# Patient Record
Sex: FEMALE | Race: WHITE | NOT HISPANIC OR LATINO | Employment: FULL TIME | URBAN - METROPOLITAN AREA
[De-identification: names, ages, dates, MRNs, and addresses within clinical notes are randomized per-mention and may not be internally consistent; named-entity substitution may affect disease eponyms.]

---

## 2017-02-17 ENCOUNTER — APPOINTMENT (OUTPATIENT)
Dept: MAMMOGRAPHY | Facility: HOSPITAL | Age: 42
End: 2017-02-17
Attending: OBSTETRICS & GYNECOLOGY

## 2017-02-21 ENCOUNTER — HOSPITAL ENCOUNTER (OUTPATIENT)
Dept: MAMMOGRAPHY | Facility: HOSPITAL | Age: 42
Discharge: HOME | End: 2017-02-21
Attending: OBSTETRICS & GYNECOLOGY | Admitting: OBSTETRICS & GYNECOLOGY

## 2017-02-21 DIAGNOSIS — N64.89 BREAST ASYMMETRY: ICD-10-CM

## 2017-02-21 PROCEDURE — G0204 DX MAMMO INCL CAD BI: HCPCS

## 2017-02-24 ENCOUNTER — APPOINTMENT (OUTPATIENT)
Dept: MAMMOGRAPHY | Facility: HOSPITAL | Age: 42
End: 2017-02-24
Attending: OBSTETRICS & GYNECOLOGY

## 2017-03-03 ENCOUNTER — HOSPITAL ENCOUNTER (OUTPATIENT)
Dept: MAMMOGRAPHY | Facility: HOSPITAL | Age: 42
Discharge: HOME | End: 2017-03-03
Attending: OBSTETRICS & GYNECOLOGY | Admitting: OBSTETRICS & GYNECOLOGY

## 2017-03-03 VITALS
TEMPERATURE: 97.8 F | OXYGEN SATURATION: 100 % | HEIGHT: 69 IN | BODY MASS INDEX: 23.7 KG/M2 | WEIGHT: 160 LBS | RESPIRATION RATE: 16 BRPM | HEART RATE: 64 BPM

## 2017-03-03 DIAGNOSIS — R92.1 BREAST CALCIFICATIONS ON MAMMOGRAM: ICD-10-CM

## 2017-03-03 PROCEDURE — 88305 TISSUE EXAM BY PATHOLOGIST: CPT | Performed by: OBSTETRICS & GYNECOLOGY

## 2017-03-03 RX ORDER — LIDOCAINE HYDROCHLORIDE 10 MG/ML
20 INJECTION, SOLUTION INFILTRATION; PERINEURAL ONCE
Status: CANCELLED | OUTPATIENT
Start: 2017-03-03 | End: 2017-03-03

## 2017-03-03 RX ORDER — LIDOCAINE WITH 8.4% SOD BICARB 0.9%(10ML)
3 SYRINGE (ML) INJECTION ONCE
Status: CANCELLED | OUTPATIENT
Start: 2017-03-03 | End: 2017-03-03

## 2017-03-03 NOTE — NURSING NOTE
VSS. Denies pain. Medical/surgical history and medications reviewed. No distress noted. Procedural education completed with patient's questions addressed.

## 2017-03-03 NOTE — NURSING NOTE
Biopsy site to left mid breast clear with Skin Affix dry and intact. Small dime sized area of firmness noted below site. Reports area is sore and somewhat painful. Plans to start Tylenol once she gets home. Paper taped two folded 4x4's over area and then applied wide Ace pressure wrap over/around breast/chest area in place or bra. Encouraged her to wear this at least 2 days and the try her bra for the remaining 2 days. Ice pack with protective covering applied inside of pressure wrap and over biopsy site. Discharge instructions discussed with understanding voiced by patient. Copies provided to patient. No distress noted. To home via private vehicle accompanied by her cousin.

## 2017-03-04 ENCOUNTER — TELEPHONE (OUTPATIENT)
Dept: INTERVENTIONAL RADIOLOGY/VASCULAR | Facility: HOSPITAL | Age: 42
End: 2017-03-04

## 2017-03-06 LAB
CYTO UR: NORMAL
LAB AP CASE REPORT: NORMAL
Lab: NORMAL
PATH REPORT.FINAL DX SPEC: NORMAL
PATH REPORT.GROSS SPEC: NORMAL

## 2017-03-07 ENCOUNTER — TELEPHONE (OUTPATIENT)
Dept: OBSTETRICS AND GYNECOLOGY | Facility: CLINIC | Age: 42
End: 2017-03-07

## 2017-03-10 ENCOUNTER — TELEPHONE (OUTPATIENT)
Dept: OBSTETRICS AND GYNECOLOGY | Facility: CLINIC | Age: 42
End: 2017-03-10

## 2017-03-14 ENCOUNTER — TELEPHONE (OUTPATIENT)
Dept: OBSTETRICS AND GYNECOLOGY | Facility: CLINIC | Age: 42
End: 2017-03-14

## 2017-03-22 ENCOUNTER — TELEPHONE (OUTPATIENT)
Dept: OBSTETRICS AND GYNECOLOGY | Facility: CLINIC | Age: 42
End: 2017-03-22

## 2017-06-08 ENCOUNTER — TELEPHONE (OUTPATIENT)
Dept: OBSTETRICS AND GYNECOLOGY | Facility: CLINIC | Age: 42
End: 2017-06-08

## 2017-06-08 NOTE — TELEPHONE ENCOUNTER
PT IS DUE FOR AE IN AUG. WANTS TO KNOW IF OK WITH YOU TO GET MIRENA BEFORE THAT. YOU HAD DISCUSSED WITH HER AT LAST VISIT.  [phone].      Phone call.  Unable to reach the patient      I have been unable to reach the patient despite several attempts.  It would be okay to place Mirena before the annual exam.  Prior to this, the patient would require counseling regarding the benefits risks and alternatives.  That could be accomplished at the same visit if needed.  Also, the patient would need either a negative pregnancy test one day prior to placement or to be on her cycle.  Thank you

## 2019-02-08 ENCOUNTER — HOSPITAL ENCOUNTER (OUTPATIENT)
Dept: LAB | Facility: HOSPITAL | Age: 44
Discharge: HOME | End: 2019-02-08

## 2019-02-09 LAB — VZV IGG SER IA-ACNC: 2671 INDEX

## 2020-03-03 ENCOUNTER — HOSPITAL ENCOUNTER (EMERGENCY)
Facility: HOSPITAL | Age: 45
Discharge: HOME | End: 2020-03-03
Attending: EMERGENCY MEDICINE | Admitting: EMERGENCY MEDICINE

## 2020-03-03 ENCOUNTER — APPOINTMENT (OUTPATIENT)
Dept: GENERAL RADIOLOGY | Facility: HOSPITAL | Age: 45
End: 2020-03-03

## 2020-03-03 VITALS
OXYGEN SATURATION: 99 % | HEART RATE: 98 BPM | TEMPERATURE: 97.4 F | HEIGHT: 69 IN | DIASTOLIC BLOOD PRESSURE: 87 MMHG | SYSTOLIC BLOOD PRESSURE: 134 MMHG | WEIGHT: 164.8 LBS | BODY MASS INDEX: 24.41 KG/M2 | RESPIRATION RATE: 17 BRPM

## 2020-03-03 DIAGNOSIS — I95.1 ORTHOSTASIS: Primary | ICD-10-CM

## 2020-03-03 DIAGNOSIS — R74.8 ELEVATED LIVER ENZYMES: ICD-10-CM

## 2020-03-03 LAB
ALBUMIN SERPL-MCNC: 4.9 G/DL (ref 3.5–5.2)
ALBUMIN/GLOB SERPL: 1.8 G/DL
ALP SERPL-CCNC: 77 U/L (ref 39–117)
ALT SERPL W P-5'-P-CCNC: 49 U/L (ref 1–33)
ANION GAP SERPL CALCULATED.3IONS-SCNC: 21.8 MMOL/L (ref 5–15)
AST SERPL-CCNC: 129 U/L (ref 1–32)
BASOPHILS # BLD AUTO: 0.06 10*3/MM3 (ref 0–0.2)
BASOPHILS NFR BLD AUTO: 1 % (ref 0–1.5)
BILIRUB SERPL-MCNC: 0.3 MG/DL (ref 0.2–1.2)
BILIRUB UR QL STRIP: NEGATIVE
BUN BLD-MCNC: 20 MG/DL (ref 6–20)
BUN/CREAT SERPL: 23 (ref 7–25)
CALCIUM SPEC-SCNC: 9.1 MG/DL (ref 8.6–10.5)
CHLORIDE SERPL-SCNC: 96 MMOL/L (ref 98–107)
CLARITY UR: ABNORMAL
CO2 SERPL-SCNC: 18.2 MMOL/L (ref 22–29)
COLOR UR: YELLOW
CREAT BLD-MCNC: 0.87 MG/DL (ref 0.57–1)
DEPRECATED RDW RBC AUTO: 40.7 FL (ref 37–54)
EOSINOPHIL # BLD AUTO: 0.03 10*3/MM3 (ref 0–0.4)
EOSINOPHIL NFR BLD AUTO: 0.5 % (ref 0.3–6.2)
ERYTHROCYTE [DISTWIDTH] IN BLOOD BY AUTOMATED COUNT: 12.4 % (ref 12.3–15.4)
GFR SERPL CREATININE-BSD FRML MDRD: 70 ML/MIN/1.73
GLOBULIN UR ELPH-MCNC: 2.7 GM/DL
GLUCOSE BLD-MCNC: 74 MG/DL (ref 65–99)
GLUCOSE UR STRIP-MCNC: NEGATIVE MG/DL
HCG SERPL QL: NEGATIVE
HCT VFR BLD AUTO: 40 % (ref 34–46.6)
HGB BLD-MCNC: 13.3 G/DL (ref 12–15.9)
HGB UR QL STRIP.AUTO: NEGATIVE
HOLD SPECIMEN: NORMAL
IMM GRANULOCYTES # BLD AUTO: 0.02 10*3/MM3 (ref 0–0.05)
IMM GRANULOCYTES NFR BLD AUTO: 0.3 % (ref 0–0.5)
KETONES UR QL STRIP: ABNORMAL
LEUKOCYTE ESTERASE UR QL STRIP.AUTO: NEGATIVE
LYMPHOCYTES # BLD AUTO: 1.03 10*3/MM3 (ref 0.7–3.1)
LYMPHOCYTES NFR BLD AUTO: 17.3 % (ref 19.6–45.3)
MAGNESIUM SERPL-MCNC: 2.1 MG/DL (ref 1.6–2.6)
MCH RBC QN AUTO: 30.2 PG (ref 26.6–33)
MCHC RBC AUTO-ENTMCNC: 33.3 G/DL (ref 31.5–35.7)
MCV RBC AUTO: 90.7 FL (ref 79–97)
MONOCYTES # BLD AUTO: 0.29 10*3/MM3 (ref 0.1–0.9)
MONOCYTES NFR BLD AUTO: 4.9 % (ref 5–12)
NEUTROPHILS # BLD AUTO: 4.51 10*3/MM3 (ref 1.7–7)
NEUTROPHILS NFR BLD AUTO: 76 % (ref 42.7–76)
NITRITE UR QL STRIP: NEGATIVE
NRBC BLD AUTO-RTO: 0 /100 WBC (ref 0–0.2)
PH UR STRIP.AUTO: <=5 [PH] (ref 5–8)
PLATELET # BLD AUTO: 240 10*3/MM3 (ref 140–450)
PMV BLD AUTO: 8.1 FL (ref 6–12)
POTASSIUM BLD-SCNC: 4.3 MMOL/L (ref 3.5–5.2)
PROT SERPL-MCNC: 7.6 G/DL (ref 6–8.5)
PROT UR QL STRIP: NEGATIVE
RBC # BLD AUTO: 4.41 10*6/MM3 (ref 3.77–5.28)
SODIUM BLD-SCNC: 136 MMOL/L (ref 136–145)
SP GR UR STRIP: 1.03 (ref 1–1.03)
TROPONIN T SERPL-MCNC: <0.01 NG/ML (ref 0–0.03)
UROBILINOGEN UR QL STRIP: ABNORMAL
WBC NRBC COR # BLD: 5.94 10*3/MM3 (ref 3.4–10.8)
WHOLE BLOOD HOLD SPECIMEN: NORMAL
WHOLE BLOOD HOLD SPECIMEN: NORMAL

## 2020-03-03 PROCEDURE — 80053 COMPREHEN METABOLIC PANEL: CPT

## 2020-03-03 PROCEDURE — 36415 COLL VENOUS BLD VENIPUNCTURE: CPT

## 2020-03-03 PROCEDURE — 71045 X-RAY EXAM CHEST 1 VIEW: CPT

## 2020-03-03 PROCEDURE — 96374 THER/PROPH/DIAG INJ IV PUSH: CPT

## 2020-03-03 PROCEDURE — 83735 ASSAY OF MAGNESIUM: CPT

## 2020-03-03 PROCEDURE — 84484 ASSAY OF TROPONIN QUANT: CPT

## 2020-03-03 PROCEDURE — 85025 COMPLETE CBC W/AUTO DIFF WBC: CPT

## 2020-03-03 PROCEDURE — 99284 EMERGENCY DEPT VISIT MOD MDM: CPT

## 2020-03-03 PROCEDURE — 81003 URINALYSIS AUTO W/O SCOPE: CPT

## 2020-03-03 PROCEDURE — 96361 HYDRATE IV INFUSION ADD-ON: CPT

## 2020-03-03 PROCEDURE — 93010 ELECTROCARDIOGRAM REPORT: CPT | Performed by: INTERNAL MEDICINE

## 2020-03-03 PROCEDURE — 84703 CHORIONIC GONADOTROPIN ASSAY: CPT

## 2020-03-03 PROCEDURE — 93005 ELECTROCARDIOGRAM TRACING: CPT | Performed by: EMERGENCY MEDICINE

## 2020-03-03 PROCEDURE — 25010000002 ONDANSETRON PER 1 MG: Performed by: EMERGENCY MEDICINE

## 2020-03-03 RX ORDER — SODIUM CHLORIDE 0.9 % (FLUSH) 0.9 %
10 SYRINGE (ML) INJECTION AS NEEDED
Status: DISCONTINUED | OUTPATIENT
Start: 2020-03-03 | End: 2020-03-03 | Stop reason: HOSPADM

## 2020-03-03 RX ORDER — ONDANSETRON 2 MG/ML
8 INJECTION INTRAMUSCULAR; INTRAVENOUS ONCE
Status: COMPLETED | OUTPATIENT
Start: 2020-03-03 | End: 2020-03-03

## 2020-03-03 RX ADMIN — SODIUM CHLORIDE 1000 ML: 9 INJECTION, SOLUTION INTRAVENOUS at 14:02

## 2020-03-03 RX ADMIN — ONDANSETRON 8 MG: 2 INJECTION INTRAMUSCULAR; INTRAVENOUS at 14:04

## 2020-03-03 NOTE — ED PROVIDER NOTES
EMERGENCY DEPARTMENT ENCOUNTER    Room Number:  12/12  Date of encounter:  3/3/2020  PCP: Provider, No Known  Historian: Patient    HPI:  Chief Complaint: Dizziness  Context: Mary Alves is a 44 y.o. female who presents to the ED c/o patient complaint: Dizziness upon getting out of bed this morning.  Patient felt lightheaded, felt like she might pass out.  Patient had spots in her vision.  Patient laid back in bed and had improvement in her symptoms.  Patient later had to get up to take her dog out and again had dizziness, more severe, spots in her vision, warm flush, nauseousness, mild diaphoresis.  Symptoms improved with sitting down.  Patient states prior to going to bed last night she was at baseline with no complaints.  Patient does report diarrhea for 2 days last episode was 2 days prior.  Patient denies any recent vomiting, eating and drinking normally, had dinner last night.  Patient denies any associated chest pain, difficulty breathing, or palpitations.  Patient did feel like her heart rate was increased during the episode but not beating irregular.  Patient denies any double vision, facial droop, difficulty with speech, difficulty swallowing, ringing or ears, hearing loss, difficulty with coordination, weakness, or paresthesias.  Patient states she had a similar episode 1 month prior but did not go to seek medical care at that time.  Patient states that she has passed out multiple times in the past.  Patient states dizziness is currently resolved and only occurs with standing.  Patient also complains of mild frontal headache, dull in nature, 3 out of 10 on pain scale.      MEDICAL HISTORY REVIEW  Reviewed in EPIC       PAST MEDICAL HISTORY  Active Ambulatory Problems     Diagnosis Date Noted   • No Active Ambulatory Problems     Resolved Ambulatory Problems     Diagnosis Date Noted   • No Resolved Ambulatory Problems     Past Medical History:   Diagnosis Date   • Asthma    • Ovarian cyst           PAST SURGICAL HISTORY  Past Surgical History:   Procedure Laterality Date   • AUGMENTATION MAMMAPLASTY     • BLADDER REPAIR     • BREAST BIOPSY     •  SECTION      Twins   • KNEE SURGERY Right    • OOPHORECTOMY           FAMILY HISTORY  Family History   Problem Relation Age of Onset   • Brain cancer Father    • Heart attack Father    • Heart attack Mother    • Colon cancer Mother          SOCIAL HISTORY  Social History     Socioeconomic History   • Marital status:      Spouse name: Not on file   • Number of children: Not on file   • Years of education: Not on file   • Highest education level: Not on file   Tobacco Use   • Smoking status: Never Smoker   • Smokeless tobacco: Never Used   Substance and Sexual Activity   • Drug use: No         ALLERGIES  Patient has no known allergies.    The patient's allergies have been reviewed    REVIEW OF SYSTEMS  All systems reviewed and negative except for those discussed in HPI.       PHYSICAL EXAM  I have reviewed the triage vital signs and nursing notes.    ED Triage Vitals   Temp Heart Rate Resp BP SpO2   20 1130 20 1130 20 1130 20 1210 20 1130   97.4 °F (36.3 °C) 75 15 141/80 100 %      Temp src Heart Rate Source Patient Position BP Location FiO2 (%)   20 1130 -- -- -- --   Tympanic           Physical Exam   Constitutional: She is oriented to person, place, and time. She appears well-developed and well-nourished.   HENT:   Head: Normocephalic and atraumatic.   Eyes: Pupils are equal, round, and reactive to light. EOM are normal.   Neck: Normal range of motion. Neck supple.   Cardiovascular: Normal rate, regular rhythm, normal heart sounds and intact distal pulses.   Pulmonary/Chest: Effort normal and breath sounds normal.   Abdominal: Soft. There is no tenderness. There is no rebound and no guarding.   Musculoskeletal: Normal range of motion. She exhibits no edema or deformity.   Neurological: She is alert and  oriented to person, place, and time.   Alert and oriented x3, face symmetric, no facial droop, eyebrows raise equally bilaterally, tongue midline, no dysarthria, no aphasia, extraocular motion intact, no nystagmus, visual acuity grossly normal, cranial nerves II through XII intact, moves all extremities well, 5 out of 5 strength in all extremities, sensation intact light touch all extremities, no ataxia, no tremor     Skin: Skin is warm and dry.   Nursing note and vitals reviewed.          LAB RESULTS  Recent Results (from the past 24 hour(s))   Comprehensive Metabolic Panel    Collection Time: 03/03/20 12:20 PM   Result Value Ref Range    Glucose 74 65 - 99 mg/dL    BUN 20 6 - 20 mg/dL    Creatinine 0.87 0.57 - 1.00 mg/dL    Sodium 136 136 - 145 mmol/L    Potassium 4.3 3.5 - 5.2 mmol/L    Chloride 96 (L) 98 - 107 mmol/L    CO2 18.2 (L) 22.0 - 29.0 mmol/L    Calcium 9.1 8.6 - 10.5 mg/dL    Total Protein 7.6 6.0 - 8.5 g/dL    Albumin 4.90 3.50 - 5.20 g/dL    ALT (SGPT) 49 (H) 1 - 33 U/L    AST (SGOT) 129 (H) 1 - 32 U/L    Alkaline Phosphatase 77 39 - 117 U/L    Total Bilirubin 0.3 0.2 - 1.2 mg/dL    eGFR Non African Amer 70 >60 mL/min/1.73    Globulin 2.7 gm/dL    A/G Ratio 1.8 g/dL    BUN/Creatinine Ratio 23.0 7.0 - 25.0    Anion Gap 21.8 (H) 5.0 - 15.0 mmol/L   Troponin    Collection Time: 03/03/20 12:20 PM   Result Value Ref Range    Troponin T <0.010 0.000 - 0.030 ng/mL   Magnesium    Collection Time: 03/03/20 12:20 PM   Result Value Ref Range    Magnesium 2.1 1.6 - 2.6 mg/dL   hCG, Serum, Qualitative    Collection Time: 03/03/20 12:20 PM   Result Value Ref Range    HCG Qualitative Negative Negative   Light Blue Top    Collection Time: 03/03/20 12:20 PM   Result Value Ref Range    Extra Tube hold for add-on    Green Top (Gel)    Collection Time: 03/03/20 12:20 PM   Result Value Ref Range    Extra Tube Hold for add-ons.    Lavender Top    Collection Time: 03/03/20 12:20 PM   Result Value Ref Range    Extra Tube  hold for add-on    CBC Auto Differential    Collection Time: 03/03/20 12:20 PM   Result Value Ref Range    WBC 5.94 3.40 - 10.80 10*3/mm3    RBC 4.41 3.77 - 5.28 10*6/mm3    Hemoglobin 13.3 12.0 - 15.9 g/dL    Hematocrit 40.0 34.0 - 46.6 %    MCV 90.7 79.0 - 97.0 fL    MCH 30.2 26.6 - 33.0 pg    MCHC 33.3 31.5 - 35.7 g/dL    RDW 12.4 12.3 - 15.4 %    RDW-SD 40.7 37.0 - 54.0 fl    MPV 8.1 6.0 - 12.0 fL    Platelets 240 140 - 450 10*3/mm3    Neutrophil % 76.0 42.7 - 76.0 %    Lymphocyte % 17.3 (L) 19.6 - 45.3 %    Monocyte % 4.9 (L) 5.0 - 12.0 %    Eosinophil % 0.5 0.3 - 6.2 %    Basophil % 1.0 0.0 - 1.5 %    Immature Grans % 0.3 0.0 - 0.5 %    Neutrophils, Absolute 4.51 1.70 - 7.00 10*3/mm3    Lymphocytes, Absolute 1.03 0.70 - 3.10 10*3/mm3    Monocytes, Absolute 0.29 0.10 - 0.90 10*3/mm3    Eosinophils, Absolute 0.03 0.00 - 0.40 10*3/mm3    Basophils, Absolute 0.06 0.00 - 0.20 10*3/mm3    Immature Grans, Absolute 0.02 0.00 - 0.05 10*3/mm3    nRBC 0.0 0.0 - 0.2 /100 WBC   Urinalysis With Microscopic If Indicated (No Culture) - Urine, Clean Catch    Collection Time: 03/03/20 12:25 PM   Result Value Ref Range    Color, UA Yellow Yellow, Straw    Appearance, UA Cloudy (A) Clear    pH, UA <=5.0 5.0 - 8.0    Specific Gravity, UA 1.026 1.005 - 1.030    Glucose, UA Negative Negative    Ketones, UA 80 mg/dL (3+) (A) Negative    Bilirubin, UA Negative Negative    Blood, UA Negative Negative    Protein, UA Negative Negative    Leuk Esterase, UA Negative Negative    Nitrite, UA Negative Negative    Urobilinogen, UA 0.2 E.U./dL 0.2 - 1.0 E.U./dL       I ordered the above labs and reviewed the results.      RADIOLOGY  Xr Chest 1 View    Result Date: 3/3/2020  ONE VIEW PORTABLE CHEST  HISTORY: Weakness and dizziness. Asthma.  FINDINGS: The lungs are well-expanded and clear and the heart and hilar structures are normal. There is no acute disease.        I ordered the above noted radiological studies. I reviewed the images and  results. I agree with the radiologist interpretation.      PROCEDURES  Procedures      MEDICATIONS GIVEN IN ER  Medications   sodium chloride 0.9 % flush 10 mL (has no administration in time range)   sodium chloride 0.9 % bolus 1,000 mL (0 mL Intravenous Stopped 3/3/20 1501)   ondansetron (ZOFRAN) injection 8 mg (8 mg Intravenous Given 3/3/20 1404)         PROGRESS, DATA ANALYSIS, CONSULTS, AND MEDICAL DECISION MAKING  A complete history and physical exam have been performed.  All available laboratory and imaging results have been reviewed by myself prior to disposition.  [facility]      ED Course as of Mar 03 1521   Tue Mar 03, 2020   1334 Discussed pertinent information from history and physical exam with patient.  Discussed differential diagnosis.  Discussed plan for ED evaluation/work-up/treatment.  All questions answered.  Patient is agreeable with plan.        [JG]   1340 Dizziness, sounds likely orthostasis, possibly vasovagal but does not have symptoms concerning for stroke or acute cardiac cause.  Obtaining work-up including lab work, EKG, orthostatics, will be placed on cardiac monitor.    [JG]   1342 EKG independently viewed and contemporaneously interpreted by ED physician. Time: 1333.  Rate 74.  Interpretation: Normal sinus rhythm, normal axis, normal QRS, no acute ST changes.    [JG]   1404 EKG here unremarkable, cardiac work-up negative.  Work-up significant for mild elevation of liver enzymes with greater than 2-1 AST to ALT elevation, likely alcohol related.  Mild anion gap.  Work-up otherwise unremarkable.  Will reassess patient and ask questions related to alcohol use history.    [JG]   1422 Patient was orthostatic positive with productive symptoms.  Blood pressure remained stable but patient did become tachycardic.  Patient receiving IV fluids.  Patient admits to drinking over the last week and several drinks last night.  Patient states she does not drink normally.  Discussed lab work with patient.   Discussed plan for discharge after completion of IV fluids.    [JG]   8430 The patient was reexamined.  They have had symptomatic improvement during their ED stay.  I discussed today's findings with the patient, explaining the pertinent positives and negatives from today's visit, and the plan of care.  Discussed plan for discharge as there is no emergent indication for admission.  Discussed limitation of the ED work-up and that this is to rule out life-threatening emergencies but that they could require further testing as determined by their primary care and or any referred specialist patient is agreeable and understands need for follow-up and repeat exam/testing.  Patient is aware that discharge does not mean there is nothing wrong, indicates no emergency is present, and that they must continue their care with their primary care physician and/or any referred specialist.  They were given appropriate follow-up with their primary care physician and/or specialist.  I had an extensive discussion on the expected clinical course and return precautions.  Patient understands to return to the emergency department for continuation, worsening, or new symptoms.  I answered any of the patient's questions. Patient was discharged home in a stable condition.        [JG]      ED Course User Index  [JG] Prabhjot Davis MD       AS OF 3:21 PM VITALS:    BP - 134/87  HR - 98  TEMP - 97.4 °F (36.3 °C) (Tympanic)  O2 SATS - 99%        DIAGNOSIS  Final diagnoses:   Orthostasis   Elevated liver enzymes         DISPOSITION  DISCHARGE    Patient discharged in stable condition.    Reviewed implications of results, diagnosis, meds, responsibility to follow up, warning signs and symptoms of possible worsening, potential complications and reasons to return to ER.    Patient/Family voiced understanding of above instructions.    Discussed plan for discharge, as there is no emergent indication for admission. Patient referred to primary care  provider for BP management due to today's BP. Pt/family is agreeable and understands need for follow up and repeat testing.  Pt is aware that discharge does not mean that nothing is wrong but it indicates no emergency is present that requires admission and they must continue care with follow-up as given below or physician of their choice.     FOLLOW-UP  Your PCP    Schedule an appointment as soon as possible for a visit in 2 days  even if well    [facility] [place] [id number]  [phone]    if you are unable to follow up with your PCP         Medication List      No changes were made to your prescriptions during this visit.              Prabhjot Davis MD  03/03/20 9068

## 2020-03-03 NOTE — ED NOTES
Pt presents to ED with complains of dizziness since 0600. Pt complains of nausea and a headache at this time. Pt denies any weakness at this time. Pt is currently A&Ox4 a this time.      Degonda, Janet, RN  03/03/20 7233

## 2020-03-06 ENCOUNTER — OFFICE VISIT (OUTPATIENT)
Dept: OBSTETRICS AND GYNECOLOGY | Facility: CLINIC | Age: 45
End: 2020-03-06

## 2020-03-06 VITALS
HEIGHT: 69 IN | BODY MASS INDEX: 23.16 KG/M2 | WEIGHT: 156.4 LBS | DIASTOLIC BLOOD PRESSURE: 68 MMHG | SYSTOLIC BLOOD PRESSURE: 118 MMHG

## 2020-03-06 DIAGNOSIS — Z79.890 HORMONE REPLACEMENT THERAPY (HRT): ICD-10-CM

## 2020-03-06 DIAGNOSIS — Z00.00 ANNUAL PHYSICAL EXAM: Primary | ICD-10-CM

## 2020-03-06 PROCEDURE — 99396 PREV VISIT EST AGE 40-64: CPT | Performed by: OBSTETRICS & GYNECOLOGY

## 2020-03-06 PROCEDURE — 99213 OFFICE O/P EST LOW 20 MIN: CPT | Performed by: OBSTETRICS & GYNECOLOGY

## 2020-03-10 LAB
CONV .: NORMAL
CYTOLOGIST CVX/VAG CYTO: NORMAL
CYTOLOGY CVX/VAG DOC CYTO: NORMAL
CYTOLOGY CVX/VAG DOC THIN PREP: NORMAL
DX ICD CODE: NORMAL
HIV 1 & 2 AB SER-IMP: NORMAL
OTHER STN SPEC: NORMAL
STAT OF ADQ CVX/VAG CYTO-IMP: NORMAL

## 2020-03-11 ENCOUNTER — TELEPHONE (OUTPATIENT)
Dept: OBSTETRICS AND GYNECOLOGY | Facility: CLINIC | Age: 45
End: 2020-03-11

## 2020-03-11 NOTE — PROGRESS NOTES
HPI   Mary Alves  is a 44 y.o. female who presents for 2 reasons.  First, she is due for her routine gynecologic exam.  Overall, she is feeling well.  She has been amenorrheic for several months.  Next, she is complaining of hot flashes and night sweats.  These have become lifestyle limiting.    Chief Complaint   Patient presents with   • Gynecologic Exam     Patient is here for a annual.       Past Medical History:   Diagnosis Date   • Asthma    • Ovarian cyst        Past Surgical History:   Procedure Laterality Date   • AUGMENTATION MAMMAPLASTY     • BLADDER REPAIR     • BREAST BIOPSY     •  SECTION      Twins   • KNEE SURGERY Right    • OOPHORECTOMY         Social History     Socioeconomic History   • Marital status:      Spouse name: Not on file   • Number of children: Not on file   • Years of education: Not on file   • Highest education level: Not on file   Tobacco Use   • Smoking status: Never Smoker   • Smokeless tobacco: Never Used   Substance and Sexual Activity   • Drug use: No       The following portions of the patient's history were reviewed and updated as appropriate: allergies, current medications, past family history, past medical history, past social history, past surgical history and problem list.    Review of Systems this is positive for hot flashes and night sweats.  It is positive for mood swings.  It is negative for fever or chills.  Negative for nausea or vomiting.  Negative for unexplained weight change.  All other systems are reviewed and are negative          Physical Exam   Constitutional: She is oriented to person, place, and time. She appears well-developed and well-nourished.   HENT:   Head: Normocephalic and atraumatic.   Cardiovascular: Normal rate and regular rhythm.   Pulmonary/Chest: Effort normal and breath sounds normal. She has no wheezes. She has no rales.   The breasts are homogeneous.  There are no palpable lumps.  Nipple discharge and axillary  adenopathy are absent.   Abdominal: Soft. She exhibits no distension. There is no tenderness.   Genitourinary: Vagina normal and uterus normal. There is no lesion on the right labia. There is no lesion on the left labia. Cervix exhibits no motion tenderness. Right adnexum displays no mass and no tenderness. Left adnexum displays no mass and no tenderness. No vaginal discharge found.   Neurological: She is alert and oriented to person, place, and time.   Skin: Skin is warm and dry.   Nursing note and vitals reviewed.      Assessment    Mary was seen today for gynecologic exam.    Diagnoses and all orders for this visit:    Annual physical exam  -     Pap IG, Rfx HPV ASCU    Hormone replacement therapy (HRT)    Other orders  -     Estradiol (ESTROGEL) 0.75 MG/1.25 GM (0.06%) topical gel; Place 1.25 g on the skin as directed by provider Daily.  -     progesterone (PROMETRIUM) 100 MG capsule; Take 1 capsule by mouth Daily.        Plan  1. Annual examination and Pap smear performed  2. Counseled regarding a cog recommendations for mammography every 1 to 2 years between the ages of 40 and 50  3. New onset hot flashes and night sweats.  These have become lifestyle limiting.  Counseled regarding the pathophysiology of the menopause and questions answered.  We discussed the benefits risks and alternatives to the use of hormone replacement.  Data from the [facility] were also reviewed.  The patient's questions were answered.  She would like to proceed with estrogen and progesterone replacement.  We discussed the benefits of transdermal over oral estrogen.  The patient agrees with this recommendation.  Prescriptions for Estrogel and Prometrium have been sent and the patient has been counseled regarding proper use.    4. Return in about 1 year (around 3/6/2021).    Social History     Tobacco Use   Smoking Status Never Smoker   5.

## 2020-09-16 ENCOUNTER — APPOINTMENT (OUTPATIENT)
Dept: GENERAL RADIOLOGY | Facility: HOSPITAL | Age: 45
End: 2020-09-16

## 2020-09-16 ENCOUNTER — HOSPITAL ENCOUNTER (EMERGENCY)
Facility: HOSPITAL | Age: 45
Discharge: HOME | End: 2020-09-16
Attending: EMERGENCY MEDICINE | Admitting: EMERGENCY MEDICINE

## 2020-09-16 VITALS
HEIGHT: 69 IN | TEMPERATURE: 98.3 F | WEIGHT: 152 LBS | HEART RATE: 76 BPM | SYSTOLIC BLOOD PRESSURE: 121 MMHG | BODY MASS INDEX: 22.51 KG/M2 | DIASTOLIC BLOOD PRESSURE: 77 MMHG | RESPIRATION RATE: 18 BRPM | OXYGEN SATURATION: 98 %

## 2020-09-16 DIAGNOSIS — R93.6 ABNORMAL X-RAY OF LOWER EXTREMITY: ICD-10-CM
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
## 2020-09-16 DIAGNOSIS — S80.12XA CONTUSION OF LEFT LOWER EXTREMITY, INITIAL ENCOUNTER: ICD-10-CM

## 2020-09-16 PROCEDURE — 99282 EMERGENCY DEPT VISIT SF MDM: CPT

## 2020-09-16 PROCEDURE — 73590 X-RAY EXAM OF LOWER LEG: CPT

## 2020-09-17 NOTE — ED PROVIDER NOTES
EMERGENCY DEPARTMENT ENCOUNTER    Room Number:  34/34  Date of encounter:  2020  PCP: Provider, No Known  Historian: Patient      I used full protective equipment while examining this patient.  This includes face mask, gloves and protective eyewear.  I washed my hands before entering the room and immediately upon leaving the room      HPI:  Chief Complaint: Left leg injury  A complete HPI/ROS/PMH/PSH/SH/FH are unobtainable due to: Nothing    Context: Mary Alves is a 45 y.o. female who presents to the ED c/o left leg injury just prior to arrival.  Patient states she was hit in her left lateral proximal lower leg just prior to arrival.  Patient states her bulldog ran into her hitting her directly in the lateral lower leg.  Patient states she heard a pop.  She denies any left knee, left ankle pain.  She denies any other injuries from the fall.  Patient denies any previous left leg injuries.  Patient states that ambulating worsens her pain.    Review of Medical Records  Reviewed patient's last ER visit from 3/3/2020.  Patient seen for hypo-tension and abnormal LFTs.    PAST MEDICAL HISTORY  Active Ambulatory Problems     Diagnosis Date Noted   • No Active Ambulatory Problems     Resolved Ambulatory Problems     Diagnosis Date Noted   • No Resolved Ambulatory Problems     Past Medical History:   Diagnosis Date   • Asthma    • Ovarian cyst          PAST SURGICAL HISTORY  Past Surgical History:   Procedure Laterality Date   • AUGMENTATION MAMMAPLASTY     • BLADDER REPAIR     • BREAST BIOPSY     •  SECTION      Twins   • KNEE SURGERY Right    • OOPHORECTOMY           FAMILY HISTORY  Family History   Problem Relation Age of Onset   • Brain cancer Father    • Heart attack Father    • Heart attack Mother    • Colon cancer Mother          SOCIAL HISTORY  Social History     Socioeconomic History   • Marital status:      Spouse name: Not on file   • Number of children: Not on file   • Years of  education: Not on file   • Highest education level: Not on file   Tobacco Use   • Smoking status: Never Smoker   • Smokeless tobacco: Never Used   Substance and Sexual Activity   • Drug use: No         ALLERGIES  Patient has no known allergies.        REVIEW OF SYSTEMS  All systems reviewed and negative except for those discussed in HPI.       PHYSICAL EXAM    I have reviewed the triage vital signs and nursing notes.    ED Triage Vitals   Temp Heart Rate Resp BP SpO2   -- 09/16/20 2004 09/16/20 2004 09/16/20 2004 09/16/20 2004    90 16 115/80 98 %      Temp src Heart Rate Source Patient Position BP Location FiO2 (%)   09/16/20 2030 09/16/20 2004 -- -- --   Tympanic Monitor          Physical Exam  GENERAL: Alert, oriented, not distressed  HENT: nares patent, head atraumatic  EYES: no scleral icterus, EOMI  CV: regular rhythm, regular rate, no murmur  RESPIRATORY: normal effort, CTA  MUSCULOSKELETAL: Mild tenderness to proximal left lateral lower leg.  No deformity.  Full range of motion of left knee.  No laxity of left knee.  Neurovascular intact distally.  Left ankle normal.  NEURO: alert, moves all extremities, follows commands  SKIN: warm, dry        RADIOLOGY  Xr Tibia Fibula 2 View Left    Result Date: 9/16/2020  LEFT TIBIA/FIBULA, AP, LATERAL  HISTORY: 45-year-old with dog bite in the upper tibia/fibula.  COMPARISON: None.  FINDINGS: There is no evidence for fracture or acute osseous abnormality of the right tibia or fibula. There is a bone lesion of the distal tibial metaphysis eccentric laterally. Lesion measures approximately 5 x 2.8 x 3.3 cm. This lesion exhibits mild expansion and there is surrounding sclerosis. There is no aggressive periosteal bone formation or evidence for fracture associated with this lesion. This lesion is favored to represent a nonossifying fibroma that is partially ossified and healed though this appearance is not entirely typical.      1. No evidence for acute abnormality of the  left tibia-fibula. 2. Peripherally sclerotic lucent lesion within the lateral aspect of the distal tibial metaphysis. 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 If no previous studies can be obtained for comparison, MRI recommended to assure that there is lack of aggressive feature.  Discussed with Dr. Ching in the emergency department 09/16/2020 at 8:51 PM.        I ordered the above noted radiological studies. Reviewed by me and discussed with radiologist.  See dictation for official radiology interpretation.      PROGRESS, DATA ANALYSIS, CONSULTS, AND MEDICAL DECISION MAKING    All labs have been independently reviewed by me.  All radiology studies have been reviewed by me and discussed with radiologist dictating the report.   EKG's independently viewed and interpreted by me.  Discussion below represents my analysis of pertinent findings related to patient's condition, differential diagnosis, treatment plan and final disposition.    I have discussed case with Dr. Ching, emergency room physician.  He has performed his own bedside examination and agrees with treatment plan.    ED Course as of Sep 17 0539   Wed Sep 16, 2020   2052 Radiology called with abnormal results.  Case discussed with Dr. Bangura, radiologist.  No foreign body from trauma identified.  Patient does have a concerning nontraumatic finding, bony mass.  Recommends MRI for further evaluation on a nonemergent basis.    [RS]   2121 Patient presents with proximal left lower leg injury.  Plan for x-rays and reevaluation.    [EE]   2130 Updated patient on no acute findings on x-rays.  Patient does have a likely fibroma of left leg.  We will refer patient to orthopedics for further evaluation.  Patient has crutches at home.    [EE]      ED Course User Index  [EE] Peter Brar PA  [RS] Benny Ching MD       AS OF 05:39 EDT  VITALS:    BP - 121/77  HR - 76  TEMP - 98.3 °F (36.8 °C) (Oral)  O2 SATS - 98%        DIAGNOSIS  Final diagnoses:   Fall, initial encounter   Contusion of left lower extremity, initial encounter   Abnormal x-ray of lower extremity         DISPOSITION  Discharged           Peter Brar PA  09/17/20 0576

## 2020-09-17 NOTE — ED NOTES
This RN wore gloves, mask, eye protection and all other necessary PPE while performing pt care.     Betty Villar, GRAHAM  09/16/20 9805

## 2020-09-17 NOTE — ED PROVIDER NOTES
Brief history of present illness: 45-year-old female who was tackled by her bulldog/pitbull mix sustaining injury to her left lower leg.  She complains of discomfort just distal to her left knee and some discomfort with range of motion.  No numbness or weakness.  Denies any other injuries    Physical exam:   Alert and appropriate.  No acute distress.  Full range of motion left lower extremity intact.  No external findings of trauma other than a small ecchymosis.  Neurovascular intact distal to the injury.    MDM:  Xr Tibia Fibula 2 View Left    Result Date: 9/16/2020  Narrative: LEFT TIBIA/FIBULA, AP, LATERAL  HISTORY: 45-year-old with dog bite in the upper tibia/fibula.  COMPARISON: None.  FINDINGS: There is no evidence for fracture or acute osseous abnormality of the right tibia or fibula. There is a bone lesion of the distal tibial metaphysis eccentric laterally. Lesion measures approximately 5 x 2.8 x 3.3 cm. This lesion exhibits mild expansion and there is surrounding sclerosis. There is no aggressive periosteal bone formation or evidence for fracture associated with this lesion. This lesion is favored to represent a nonossifying fibroma that is partially ossified and healed though this appearance is not entirely typical.      Impression: 1. No evidence for acute abnormality of the left tibia-fibula. 2. Peripherally sclerotic lucent lesion within the lateral aspect of the distal tibial metaphysis. 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 If no previous studies can be obtained for comparison, MRI recommended to assure that there is lack of aggressive feature.  Discussed with Dr. Ching in the emergency department 09/16/2020 at 8:51 PM.      Reviewed this critical finding with the patient.  Recommend close outpatient follow-up.  Patient agreeable with plan.  Ace wrap and crutches  offered.    I have seen and personally evaluated this patient, discussed the case with the treating advanced practice provider, and reviewed their note. I was involved in the medical decision making during the evaluation, testing and disposition planning for this patient.     Benny Chign MD  09/16/20 2204

## 2020-09-17 NOTE — ED TRIAGE NOTES
Pt reports she was knocked over by her dog. Pt reports left leg pain. Pt reports pain 5/10. Pt reports pain becomes worse with ambulation.        Pt was wearing a mask during assessment.  This RN wore appropriate PPE

## 2021-04-02 ENCOUNTER — BULK ORDERING (OUTPATIENT)
Dept: CASE MANAGEMENT | Facility: OTHER | Age: 46
End: 2021-04-02

## 2021-04-02 DIAGNOSIS — Z23 IMMUNIZATION DUE: ICD-10-CM

## 2022-03-04 ENCOUNTER — APPOINTMENT (OUTPATIENT)
Dept: CT IMAGING | Facility: HOSPITAL | Age: 47
End: 2022-03-04

## 2022-03-04 ENCOUNTER — APPOINTMENT (OUTPATIENT)
Dept: ULTRASOUND IMAGING | Facility: HOSPITAL | Age: 47
End: 2022-03-04

## 2022-03-04 ENCOUNTER — HOSPITAL ENCOUNTER (OUTPATIENT)
Facility: HOSPITAL | Age: 47
Setting detail: OBSERVATION
Discharge: HOME | End: 2022-03-04
Attending: EMERGENCY MEDICINE | Admitting: EMERGENCY MEDICINE

## 2022-03-04 ENCOUNTER — APPOINTMENT (OUTPATIENT)
Dept: GENERAL RADIOLOGY | Facility: HOSPITAL | Age: 47
End: 2022-03-04

## 2022-03-04 VITALS
WEIGHT: 159 LBS | DIASTOLIC BLOOD PRESSURE: 69 MMHG | HEART RATE: 57 BPM | TEMPERATURE: 98 F | SYSTOLIC BLOOD PRESSURE: 113 MMHG | RESPIRATION RATE: 16 BRPM | HEIGHT: 69 IN | BODY MASS INDEX: 23.55 KG/M2 | OXYGEN SATURATION: 100 %

## 2022-03-04 DIAGNOSIS — R10.13 EPIGASTRIC PAIN: Primary | ICD-10-CM

## 2022-03-04 DIAGNOSIS — I49.8 JUNCTIONAL RHYTHM: ICD-10-CM

## 2022-03-04 PROBLEM — R10.9 ABDOMINAL PAIN: Status: ACTIVE | Noted: 2022-03-04

## 2022-03-04 LAB
ALBUMIN SERPL-MCNC: 4.9 G/DL (ref 3.5–5.2)
ALBUMIN/GLOB SERPL: 2.1 G/DL
ALP SERPL-CCNC: 74 U/L (ref 39–117)
ALT SERPL W P-5'-P-CCNC: 25 U/L (ref 1–33)
ANION GAP SERPL CALCULATED.3IONS-SCNC: 11.9 MMOL/L (ref 5–15)
AST SERPL-CCNC: 29 U/L (ref 1–32)
BASOPHILS # BLD AUTO: 0.05 10*3/MM3 (ref 0–0.2)
BASOPHILS NFR BLD AUTO: 0.9 % (ref 0–1.5)
BILIRUB SERPL-MCNC: 0.4 MG/DL (ref 0–1.2)
BUN SERPL-MCNC: 9 MG/DL (ref 6–20)
BUN/CREAT SERPL: 13 (ref 7–25)
CALCIUM SPEC-SCNC: 9.6 MG/DL (ref 8.6–10.5)
CHLORIDE SERPL-SCNC: 101 MMOL/L (ref 98–107)
CO2 SERPL-SCNC: 29.1 MMOL/L (ref 22–29)
CREAT SERPL-MCNC: 0.69 MG/DL (ref 0.57–1)
DEPRECATED RDW RBC AUTO: 41.9 FL (ref 37–54)
EGFRCR SERPLBLD CKD-EPI 2021: 107.9 ML/MIN/1.73
EOSINOPHIL # BLD AUTO: 0.3 10*3/MM3 (ref 0–0.4)
EOSINOPHIL NFR BLD AUTO: 5.3 % (ref 0.3–6.2)
ERYTHROCYTE [DISTWIDTH] IN BLOOD BY AUTOMATED COUNT: 12.8 % (ref 12.3–15.4)
GLOBULIN UR ELPH-MCNC: 2.3 GM/DL
GLUCOSE SERPL-MCNC: 104 MG/DL (ref 65–99)
HCG SERPL QL: NEGATIVE
HCT VFR BLD AUTO: 38.9 % (ref 34–46.6)
HGB BLD-MCNC: 13.3 G/DL (ref 12–15.9)
IMM GRANULOCYTES # BLD AUTO: 0.01 10*3/MM3 (ref 0–0.05)
IMM GRANULOCYTES NFR BLD AUTO: 0.2 % (ref 0–0.5)
INR PPP: 1.12 (ref 0.9–1.1)
LIPASE SERPL-CCNC: 30 U/L (ref 13–60)
LYMPHOCYTES # BLD AUTO: 1.51 10*3/MM3 (ref 0.7–3.1)
LYMPHOCYTES NFR BLD AUTO: 26.6 % (ref 19.6–45.3)
MAGNESIUM SERPL-MCNC: 1.8 MG/DL (ref 1.6–2.6)
MCH RBC QN AUTO: 30.8 PG (ref 26.6–33)
MCHC RBC AUTO-ENTMCNC: 34.2 G/DL (ref 31.5–35.7)
MCV RBC AUTO: 90 FL (ref 79–97)
MONOCYTES # BLD AUTO: 0.48 10*3/MM3 (ref 0.1–0.9)
MONOCYTES NFR BLD AUTO: 8.5 % (ref 5–12)
NEUTROPHILS NFR BLD AUTO: 3.33 10*3/MM3 (ref 1.7–7)
NEUTROPHILS NFR BLD AUTO: 58.5 % (ref 42.7–76)
NRBC BLD AUTO-RTO: 0 /100 WBC (ref 0–0.2)
NT-PROBNP SERPL-MCNC: 58.2 PG/ML (ref 0–450)
PLATELET # BLD AUTO: 215 10*3/MM3 (ref 140–450)
PMV BLD AUTO: 8.4 FL (ref 6–12)
POTASSIUM SERPL-SCNC: 3.8 MMOL/L (ref 3.5–5.2)
PROT SERPL-MCNC: 7.2 G/DL (ref 6–8.5)
PROTHROMBIN TIME: 14.4 SECONDS (ref 11.7–14.2)
QT INTERVAL: 410 MS
QT INTERVAL: 432 MS
RBC # BLD AUTO: 4.32 10*6/MM3 (ref 3.77–5.28)
SARS-COV-2 ORF1AB RESP QL NAA+PROBE: NOT DETECTED
SODIUM SERPL-SCNC: 142 MMOL/L (ref 136–145)
TROPONIN T SERPL-MCNC: <0.01 NG/ML (ref 0–0.03)
TROPONIN T SERPL-MCNC: <0.01 NG/ML (ref 0–0.03)
WBC NRBC COR # BLD: 5.68 10*3/MM3 (ref 3.4–10.8)

## 2022-03-04 PROCEDURE — 99285 EMERGENCY DEPT VISIT HI MDM: CPT

## 2022-03-04 PROCEDURE — C9803 HOPD COVID-19 SPEC COLLECT: HCPCS

## 2022-03-04 PROCEDURE — 76705 ECHO EXAM OF ABDOMEN: CPT

## 2022-03-04 PROCEDURE — 96375 TX/PRO/DX INJ NEW DRUG ADDON: CPT

## 2022-03-04 PROCEDURE — 84484 ASSAY OF TROPONIN QUANT: CPT | Performed by: EMERGENCY MEDICINE

## 2022-03-04 PROCEDURE — 25010000002 ONDANSETRON PER 1 MG: Performed by: EMERGENCY MEDICINE

## 2022-03-04 PROCEDURE — U0004 COV-19 TEST NON-CDC HGH THRU: HCPCS | Performed by: EMERGENCY MEDICINE

## 2022-03-04 PROCEDURE — G0378 HOSPITAL OBSERVATION PER HR: HCPCS

## 2022-03-04 PROCEDURE — 83735 ASSAY OF MAGNESIUM: CPT | Performed by: EMERGENCY MEDICINE

## 2022-03-04 PROCEDURE — 25010000002 FENTANYL CITRATE (PF) 50 MCG/ML SOLUTION: Performed by: EMERGENCY MEDICINE

## 2022-03-04 PROCEDURE — 71275 CT ANGIOGRAPHY CHEST: CPT

## 2022-03-04 PROCEDURE — 93005 ELECTROCARDIOGRAM TRACING: CPT | Performed by: EMERGENCY MEDICINE

## 2022-03-04 PROCEDURE — 25010000002 IOPAMIDOL 61 % SOLUTION: Performed by: EMERGENCY MEDICINE

## 2022-03-04 PROCEDURE — 36415 COLL VENOUS BLD VENIPUNCTURE: CPT

## 2022-03-04 PROCEDURE — 85025 COMPLETE CBC W/AUTO DIFF WBC: CPT | Performed by: EMERGENCY MEDICINE

## 2022-03-04 PROCEDURE — 71045 X-RAY EXAM CHEST 1 VIEW: CPT

## 2022-03-04 PROCEDURE — 96374 THER/PROPH/DIAG INJ IV PUSH: CPT

## 2022-03-04 PROCEDURE — 99204 OFFICE O/P NEW MOD 45 MIN: CPT | Performed by: INTERNAL MEDICINE

## 2022-03-04 PROCEDURE — 84703 CHORIONIC GONADOTROPIN ASSAY: CPT | Performed by: EMERGENCY MEDICINE

## 2022-03-04 PROCEDURE — 74177 CT ABD & PELVIS W/CONTRAST: CPT

## 2022-03-04 PROCEDURE — 96376 TX/PRO/DX INJ SAME DRUG ADON: CPT

## 2022-03-04 PROCEDURE — 83880 ASSAY OF NATRIURETIC PEPTIDE: CPT | Performed by: EMERGENCY MEDICINE

## 2022-03-04 PROCEDURE — 83690 ASSAY OF LIPASE: CPT | Performed by: EMERGENCY MEDICINE

## 2022-03-04 PROCEDURE — 85610 PROTHROMBIN TIME: CPT | Performed by: EMERGENCY MEDICINE

## 2022-03-04 PROCEDURE — 80053 COMPREHEN METABOLIC PANEL: CPT | Performed by: EMERGENCY MEDICINE

## 2022-03-04 RX ORDER — SODIUM CHLORIDE 9 MG/ML
100 INJECTION, SOLUTION INTRAVENOUS CONTINUOUS
Status: DISCONTINUED | OUTPATIENT
Start: 2022-03-04 | End: 2022-03-04

## 2022-03-04 RX ORDER — SODIUM CHLORIDE 0.9 % (FLUSH) 0.9 %
10 SYRINGE (ML) INJECTION AS NEEDED
Status: DISCONTINUED | OUTPATIENT
Start: 2022-03-04 | End: 2022-03-04 | Stop reason: HOSPADM

## 2022-03-04 RX ORDER — ONDANSETRON 2 MG/ML
4 INJECTION INTRAMUSCULAR; INTRAVENOUS ONCE
Status: COMPLETED | OUTPATIENT
Start: 2022-03-04 | End: 2022-03-04

## 2022-03-04 RX ORDER — ASPIRIN 81 MG/1
324 TABLET, CHEWABLE ORAL ONCE
Status: COMPLETED | OUTPATIENT
Start: 2022-03-04 | End: 2022-03-04

## 2022-03-04 RX ORDER — NITROGLYCERIN 0.4 MG/1
0.4 TABLET SUBLINGUAL
Status: DISCONTINUED | OUTPATIENT
Start: 2022-03-04 | End: 2022-03-04 | Stop reason: SDUPTHER

## 2022-03-04 RX ORDER — ACETAMINOPHEN 650 MG/1
650 SUPPOSITORY RECTAL EVERY 4 HOURS PRN
Status: DISCONTINUED | OUTPATIENT
Start: 2022-03-04 | End: 2022-03-04 | Stop reason: HOSPADM

## 2022-03-04 RX ORDER — FENTANYL CITRATE 50 UG/ML
50 INJECTION, SOLUTION INTRAMUSCULAR; INTRAVENOUS ONCE
Status: COMPLETED | OUTPATIENT
Start: 2022-03-04 | End: 2022-03-04

## 2022-03-04 RX ORDER — NITROGLYCERIN 0.4 MG/1
0.4 TABLET SUBLINGUAL
Status: DISCONTINUED | OUTPATIENT
Start: 2022-03-04 | End: 2022-03-04 | Stop reason: HOSPADM

## 2022-03-04 RX ORDER — ACETAMINOPHEN 325 MG/1
650 TABLET ORAL EVERY 4 HOURS PRN
Status: DISCONTINUED | OUTPATIENT
Start: 2022-03-04 | End: 2022-03-04 | Stop reason: HOSPADM

## 2022-03-04 RX ORDER — PANTOPRAZOLE SODIUM 40 MG/10ML
40 INJECTION, POWDER, LYOPHILIZED, FOR SOLUTION INTRAVENOUS
Status: DISCONTINUED | OUTPATIENT
Start: 2022-03-04 | End: 2022-03-04 | Stop reason: HOSPADM

## 2022-03-04 RX ORDER — LEVOTHYROXINE SODIUM 0.03 MG/1
25 TABLET ORAL DAILY
COMMUNITY
Start: 2021-10-15 | End: 2022-05-26 | Stop reason: SDUPTHER

## 2022-03-04 RX ORDER — SODIUM CHLORIDE 0.9 % (FLUSH) 0.9 %
10 SYRINGE (ML) INJECTION EVERY 12 HOURS SCHEDULED
Status: DISCONTINUED | OUTPATIENT
Start: 2022-03-04 | End: 2022-03-04 | Stop reason: HOSPADM

## 2022-03-04 RX ORDER — ACETAMINOPHEN 160 MG/5ML
650 SOLUTION ORAL EVERY 4 HOURS PRN
Status: DISCONTINUED | OUTPATIENT
Start: 2022-03-04 | End: 2022-03-04 | Stop reason: HOSPADM

## 2022-03-04 RX ORDER — ONDANSETRON 2 MG/ML
4 INJECTION INTRAMUSCULAR; INTRAVENOUS EVERY 6 HOURS PRN
Status: DISCONTINUED | OUTPATIENT
Start: 2022-03-04 | End: 2022-03-04 | Stop reason: HOSPADM

## 2022-03-04 RX ADMIN — PANTOPRAZOLE SODIUM 40 MG: 40 INJECTION, POWDER, FOR SOLUTION INTRAVENOUS at 17:26

## 2022-03-04 RX ADMIN — ONDANSETRON 4 MG: 2 INJECTION INTRAMUSCULAR; INTRAVENOUS at 08:43

## 2022-03-04 RX ADMIN — IOPAMIDOL 100 ML: 612 INJECTION, SOLUTION INTRAVENOUS at 09:55

## 2022-03-04 RX ADMIN — ASPIRIN 324 MG: 81 TABLET, CHEWABLE ORAL at 08:42

## 2022-03-04 RX ADMIN — FENTANYL CITRATE 50 MCG: 50 INJECTION INTRAMUSCULAR; INTRAVENOUS at 08:50

## 2022-03-04 RX ADMIN — SODIUM CHLORIDE 1000 ML: 9 INJECTION, SOLUTION INTRAVENOUS at 08:37

## 2022-03-04 RX ADMIN — NITROGLYCERIN 0.4 MG: 0.4 TABLET SUBLINGUAL at 08:50

## 2022-03-04 RX ADMIN — NITROGLYCERIN 0.4 MG: 0.4 TABLET SUBLINGUAL at 08:44

## 2022-03-04 RX ADMIN — SODIUM CHLORIDE 100 ML/HR: 9 INJECTION, SOLUTION INTRAVENOUS at 12:34

## 2022-03-04 RX ADMIN — FENTANYL CITRATE 50 MCG: 50 INJECTION INTRAMUSCULAR; INTRAVENOUS at 11:06

## 2022-03-04 RX ADMIN — Medication 10 ML: at 12:22

## 2022-03-04 NOTE — PLAN OF CARE
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  Cardiology was consulted and believed it to be a vasovagal response to the pain.  On admission to the unit the patient's vitals have been stable and her pain has improved.  Will continue to monitor.

## 2022-03-04 NOTE — H&P
Patient Name:  Mary Alves  YOB: 1975  MRN:  [id number]  Admit Date:  3/4/2022  Patient Care Team:  Provider, No Known as PCP - General      Chief Complaint   Patient presents with   • Abdominal Pain       Subjective     Ms. Alves is a 46 y.o. female with a history of asthma that presents to [facility] complaining of near syncope. Patient reports that she experienced acute onset of epigastric pain with associated nausea without vomiting and states that she felt like she would pass out. Patient is actually a nurse in ICU and was placed on a monitor at that time, found to be in a junctional rhythm with SBP in the 80s. She was taken down to ED at that time. Labs were fairly unremarkable and CT abdomen and pelvis showed mild gastritis, CTA chest negative for PE. Patient reports her pain has improved and so have her vitals since arriving in the ED, she denies history of this pain in the past. Patient denies fever, chest pain, shortness of breath, abdominal pain, nausea or vomiting. Cardiology was consulted, saw patient and cleared her for discharge prior to my exam. She reports that she feels back to baseline now and is ready to be discharged home at this time.     History of Present Illness    Past Medical History:   Diagnosis Date   • Asthma    • Ovarian cyst      Past Surgical History:   Procedure Laterality Date   • AUGMENTATION MAMMAPLASTY     • BLADDER REPAIR     • BREAST BIOPSY     •  SECTION      Twins   • KNEE SURGERY Right    • OOPHORECTOMY       Family History   Problem Relation Age of Onset   • Brain cancer Father    • Heart attack Father    • Heart attack Mother    • Colon cancer Mother      Social History     Tobacco Use   • Smoking status: Never Smoker   • Smokeless tobacco: Never Used   Substance Use Topics   • Alcohol use: Not on file     Comment: occasionally   • Drug use: No     Medications Prior to Admission   Medication Sig Dispense Refill Last  Dose   • levothyroxine (SYNTHROID, LEVOTHROID) 25 MCG tablet Take 25 mcg by mouth Daily.   3/4/2022 at Unknown time   • Multiple Vitamins-Minerals (MULTIVITAMIN ADULT PO) Take 1 tablet by mouth Daily.   3/4/2022 at Unknown time   • OMEGA-3 FATTY ACIDS PO Take 1 capsule by mouth Daily.   3/4/2022 at Unknown time   • progesterone (PROMETRIUM) 100 MG capsule Take 1 capsule by mouth Daily. 30 capsule 11 3/4/2022 at Unknown time     Allergies:  No Known Allergies    Review of Systems   All other systems reviewed and are negative.       Objective    Vital Signs  Temp:  [97.9 °F (36.6 °C)-98 °F (36.7 °C)] 98 °F (36.7 °C)  Heart Rate:  [57-84] 57  Resp:  [16-18] 16  BP: (101-138)/(62-98) 113/69  SpO2:  [95 %-100 %] 100 %  on   ;   Device (Oxygen Therapy): room air  Body mass index is 23.48 kg/m².    Physical Exam  Vitals and nursing note reviewed.   Constitutional:       General: She is not in acute distress.     Appearance: Normal appearance. She is normal weight.   HENT:      Head: Normocephalic and atraumatic.      Nose: Nose normal.      Mouth/Throat:      Mouth: Mucous membranes are moist.   Eyes:      Extraocular Movements: Extraocular movements intact.   Cardiovascular:      Rate and Rhythm: Normal rate and regular rhythm.      Pulses: Normal pulses.      Heart sounds: Normal heart sounds.   Pulmonary:      Effort: Pulmonary effort is normal. No respiratory distress.      Breath sounds: Normal breath sounds.   Abdominal:      General: Abdomen is flat. Bowel sounds are normal.      Palpations: Abdomen is soft.   Musculoskeletal:         General: No swelling. Normal range of motion.      Cervical back: Normal range of motion and neck supple. No rigidity.   Skin:     General: Skin is warm and dry.   Neurological:      General: No focal deficit present.      Mental Status: She is alert and oriented to person, place, and time. Mental status is at baseline.   Psychiatric:         Mood and Affect: Mood normal.          Behavior: Behavior normal.         Thought Content: Thought content normal.         Judgment: Judgment normal.         Results Review:   I reviewed the patient's new clinical results including all labs and xrays.    Lab Results (last 24 hours)     Procedure Component Value Units Date/Time    Comprehensive Metabolic Panel [[id number]]  (Abnormal) Collected: 03/04/22 0833    Specimen: Blood Updated: 03/04/22 0930     Glucose 104 mg/dL      BUN 9 mg/dL      Creatinine 0.69 mg/dL      Sodium 142 mmol/L      Potassium 3.8 mmol/L      Chloride 101 mmol/L      CO2 29.1 mmol/L      Calcium 9.6 mg/dL      Total Protein 7.2 g/dL      Albumin 4.90 g/dL      ALT (SGPT) 25 U/L      AST (SGOT) 29 U/L      Alkaline Phosphatase 74 U/L      Total Bilirubin 0.4 mg/dL      Globulin 2.3 gm/dL      A/G Ratio 2.1 g/dL      BUN/Creatinine Ratio 13.0     Anion Gap 11.9 mmol/L      eGFR 107.9 mL/min/1.73      Comment: National Kidney Foundation and American Society of Nephrology (ASN) Task Force recommended calculation based on the Chronic Kidney Disease Epidemiology Collaboration (CKD-EPI) equation refit without adjustment for race.       Narrative:      GFR Normal >60  Chronic Kidney Disease <60  Kidney Failure <15      Protime-INR [[id number]]  (Abnormal) Collected: 03/04/22 0833    Specimen: Blood Updated: 03/04/22 0856     Protime 14.4 Seconds      INR 1.12    Troponin [[id number]]  (Normal) Collected: 03/04/22 0833    Specimen: Blood Updated: 03/04/22 0928     Troponin T <0.010 ng/mL     Narrative:      Troponin T Reference Range:  <= 0.03 ng/mL-   Negative for AMI  >0.03 ng/mL-     Abnormal for myocardial necrosis.  Clinicians would have to utilize clinical acumen, EKG, Troponin and serial changes to determine if it is an Acute Myocardial Infarction or myocardial injury due to an underlying chronic condition.       Results may be falsely decreased if patient taking Biotin.      BNP [[id number]]  (Normal) Collected: 03/04/22 0833     Specimen: Blood Updated: 03/04/22 0916     proBNP 58.2 pg/mL     Narrative:      Among patients with dyspnea, NT-proBNP is highly sensitive for the detection of acute congestive heart failure. In addition NT-proBNP of <300 pg/ml effectively rules out acute congestive heart failure with 99% negative predictive value.    Results may be falsely decreased if patient taking Biotin.      hCG, Serum, Qualitative [[id number]]  (Normal) Collected: 03/04/22 0833    Specimen: Blood Updated: 03/04/22 0910     HCG Qualitative Negative    Lipase [[id number]]  (Normal) Collected: 03/04/22 0833    Specimen: Blood Updated: 03/04/22 1251     Lipase 30 U/L     Magnesium [[id number]]  (Normal) Collected: 03/04/22 0833    Specimen: Blood Updated: 03/04/22 0930     Magnesium 1.8 mg/dL     CBC & Differential [[id number]]  (Normal) Collected: 03/04/22 0834    Specimen: Blood Updated: 03/04/22 0845    Narrative:      The following orders were created for panel order CBC & Differential.  Procedure                               Abnormality         Status                     ---------                               -----------         ------                     CBC Auto Differential[[id number]]        Normal              Final result                 Please view results for these tests on the individual orders.    CBC Auto Differential [[id number]]  (Normal) Collected: 03/04/22 0834    Specimen: Blood Updated: 03/04/22 0845     WBC 5.68 10*3/mm3      RBC 4.32 10*6/mm3      Hemoglobin 13.3 g/dL      Hematocrit 38.9 %      MCV 90.0 fL      MCH 30.8 pg      MCHC 34.2 g/dL      RDW 12.8 %      RDW-SD 41.9 fl      MPV 8.4 fL      Platelets 215 10*3/mm3      Neutrophil % 58.5 %      Lymphocyte % 26.6 %      Monocyte % 8.5 %      Eosinophil % 5.3 %      Basophil % 0.9 %      Immature Grans % 0.2 %      Neutrophils, Absolute 3.33 10*3/mm3      Lymphocytes, Absolute 1.51 10*3/mm3      Monocytes, Absolute 0.48 10*3/mm3      Eosinophils, Absolute 0.30 10*3/mm3       Basophils, Absolute 0.05 10*3/mm3      Immature Grans, Absolute 0.01 10*3/mm3      nRBC 0.0 /100 WBC     Troponin [[id number]]  (Normal) Collected: 03/04/22 1030    Specimen: Blood Updated: 03/04/22 1109     Troponin T <0.010 ng/mL     Narrative:      Troponin T Reference Range:  <= 0.03 ng/mL-   Negative for AMI  >0.03 ng/mL-     Abnormal for myocardial necrosis.  Clinicians would have to utilize clinical acumen, EKG, Troponin and serial changes to determine if it is an Acute Myocardial Infarction or myocardial injury due to an underlying chronic condition.       Results may be falsely decreased if patient taking Biotin.      COVID PRE-OP / PRE-PROCEDURE SCREENING ORDER (NO ISOLATION) - Swab, Nasopharynx [[id number]]  (Normal) Collected: 03/04/22 1102    Specimen: Swab from Nasopharynx Updated: 03/04/22 1456    Narrative:      The following orders were created for panel order COVID PRE-OP / PRE-PROCEDURE SCREENING ORDER (NO ISOLATION) - Swab, Nasopharynx.  Procedure                               Abnormality         Status                     ---------                               -----------         ------                     COVID-19,APTIMA PANTHER(...[[id number]]  Normal              Final result                 Please view results for these tests on the individual orders.    COVID-19,APTIMA PANTHER(NIC),[facility], NP/OP SWAB IN UTM/VTM/SALINE TRANSPORT MEDIA,24 HR TAT - Swab, Nasopharynx [[id number]]  (Normal) Collected: 03/04/22 1102    Specimen: Swab from Nasopharynx Updated: 03/04/22 1456     COVID19 Not Detected    Narrative:      Fact sheet for providers: https://www.fda.gov/media/571546/download     Fact sheet for patients: https://www.fda.gov/media/726975/download    Test performed by RT PCR.          US Gallbladder   Final Result      CT Abdomen Pelvis With Contrast   Final Result   1. There is no evidence for pulmonary thromboemboli. No acute   abnormality is seen within the chest.   2. Mild  gastritis is suspected. No acute abnormality is otherwise seen   within the abdomen or pelvis.       Discussed with Dr. Valverde.            This report was finalized on 3/4/2022 2:07 PM by Dr. Chio Cabrera M.D.          CT Angiogram Chest   Final Result   1. There is no evidence for pulmonary thromboemboli. No acute   abnormality is seen within the chest.   2. Mild gastritis is suspected. No acute abnormality is otherwise seen   within the abdomen or pelvis.       Discussed with Dr. Valverde.            This report was finalized on 3/4/2022 2:07 PM by Dr. Chio Cabrera M.D.          XR Chest 1 View   Final Result   No acute process is thought to be present. There is some   soft tissue attenuation of the mid to lower right lung markings.       This report was finalized on 3/4/2022 9:36 AM by Dr. Charles Daniels M.D.            Assessment/Plan      Active Hospital Problems    Diagnosis  POA   • Abdominal pain [R10.9]  Yes      Resolved Hospital Problems   No resolved problems to display.     Abdominal pain  -acute onset abdominal pain causing vasovagal response  -CT showed mild gastritis, gallbladder US unremarkable  -PPI BID  -cardiology consulted for junctional rhythm, cleared for discharge without further cardiac workup, see their note  -cardiology follow-up at discharge  -GI follow-up at discharge     VTE Ppx  -SCDs    CODE status  -full    I discussed the patients findings and my recommendations with patient.    This H&P will additionally serve as discharge summary given timing of consultants and patient's desire to be discharged at time of my exam.     VANCE Kendall  [facility]  03/04/22  5:52 PM EST

## 2022-03-04 NOTE — CONSULTS
Cardiology History & Physical / Consultation      Patient Name: Mary Alves  Age/Sex: 46 y.o. female  : 1975  MRN: [id number]    Date of Admission: 3/4/2022  Date of Encounter Visit: 22  Encounter Provider: Arnel Cee MD  Referring Provider: No ref. provider found  Place of Service: [facility] CARDIOLOGY  Patient Care Team:  Provider, No Known as PCP - General          Subjective:     Chief Complaint: mid epigastric     Reason for consultation: mid epigastric pain    History of Present Illness:  Mary Alves is a 46 y.o. female pt with a history of Asthma.     Pt presented to ER with complaints of mid epigastric pain. Pt described it as sharp, tightness in mid epigastric region with some shortness of breath. 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 Pt reported she ate 3 eggs for breakfast about an hour before pain started. Pt had diaphoresis and palpitations with the pain. Pt had a near syncopal episode and when her blood pressure was checked it was in the 80s. Pt was also nauseated.  Patient had a rhythm strip done up in the unit which did not show definitive P waves and QRS was slightly widened.  Pt was sent to ER. Pt had a similar episode in September and she had an ECHO and a ZIO. Per pt the ECHO was OK. She did not find out the ZIO results because she was on a traveling nurse contract and returned here. Pt reported she works out regularly. EKG showed NSR 63, some subtle J point elevation in leads II and III. Flipped T waves in 1 and AVL, Troponin negative x 2.  Patient's initial EKG showed potential changes however repeat ECG was exactly the same as it was in .  EKG that was overcorrection did have some baseline artifact it was not definitive.  Patient also became incredibly diaphoretic set her nursing gown had become soaked.  Patient does use some supplements for her bodybuilding does include  L-arginine.  Patient has been utilizing this for years.  Patient's pain has now localized into her mid abdomen area.  Patient is never had chest discomfort              ECHO 10/14/21        Past Medical History:  Past Medical History:   Diagnosis Date   • Asthma    • Ovarian cyst        Past Surgical History:   Procedure Laterality Date   • AUGMENTATION MAMMAPLASTY     • BLADDER REPAIR     • BREAST BIOPSY     •  SECTION      Twins   • KNEE SURGERY Right    • OOPHORECTOMY         Home Medications:   (Not in a hospital admission)      Allergies:  No Known Allergies    Past Social History:  Social History     Socioeconomic History   • Marital status:    Tobacco Use   • Smoking status: Never Smoker   • Smokeless tobacco: Never Used   Substance and Sexual Activity   • Drug use: No       Past Family History: History reviewed. No pertinent family history.   Family History   Problem Relation Age of Onset   • Brain cancer Father    • Heart attack Father    • Heart attack Mother    • Colon cancer Mother        Review of Systems   All other systems reviewed and are negative.          Objective:     Objective:  Temp:  [97.9 °F (36.6 °C)] 97.9 °F (36.6 °C)  Heart Rate:  [64-84] 84  Resp:  [18] 18  BP: (101-135)/(62-98) 127/85    Intake/Output Summary (Last 24 hours) at 3/4/2022 1110  Last data filed at 3/4/2022 1029  Gross per 24 hour   Intake 1000 ml   Output --   Net 1000 ml     Body mass index is 23.48 kg/m².      22  0828   Weight: 72.1 kg (159 lb)           Physical Exam:   Vitals reviewed.   Constitutional:       Appearance: Well-developed.   Eyes:      Conjunctiva/sclera: Conjunctivae normal.   HENT:      Head: Normocephalic.   Pulmonary:      Breath sounds: Normal breath sounds.   Cardiovascular:      Normal rate. Regular rhythm.   Abdominal:      General: Bowel sounds are normal.      Palpations: Abdomen is soft.   Musculoskeletal: Normal range of motion.      Cervical back: Normal range of motion.  Skin:     General: Skin is warm and dry.   Neurological:      Mental Status: Alert and oriented to person, place, and time.   Psychiatric:         Behavior: Behavior normal.          Labs:   Lab Review:     Results from last 7 days   Lab Units 03/04/22  0833   SODIUM mmol/L 142   POTASSIUM mmol/L 3.8   CHLORIDE mmol/L 101   CO2 mmol/L 29.1*   BUN mg/dL 9   CREATININE mg/dL 0.69   GLUCOSE mg/dL 104*   CALCIUM mg/dL 9.6   AST (SGOT) U/L 29   ALT (SGPT) U/L 25     Results from last 7 days   Lab Units 03/04/22  1030 03/04/22  0833   TROPONIN T ng/mL <0.010 <0.010     Results from last 7 days   Lab Units 03/04/22  0834   WBC 10*3/mm3 5.68   HEMOGLOBIN g/dL 13.3   HEMATOCRIT % 38.9   PLATELETS 10*3/mm3 215     Results from last 7 days   Lab Units 03/04/22  0833   INR  1.12*         Results from last 7 days   Lab Units 03/04/22  0833   MAGNESIUM mg/dL 1.8         Results from last 7 days   Lab Units 03/04/22  0833   PROBNP pg/mL 58.2                 PREVIOUS EKG:          EKG:             Assessment:       Abdominal pain        Plan:      1.  Patient with abdominal pain causing patient to become diaphoretic blood pressure dropped and baby had a junctional rhythm but rate was fairly stable.  I think she had a vasovagal event secondary to severe epigastric discomfort.  Do not think the EKG shows definitive acute changes.  Patient's had serial enzymes which are negative.  She had an echo in [place] as well as a Zio patch which we are unaware of those results at the current time trying to find them.  Patient is on L-arginine which can easily cause some of the symptoms.  I told her in the future I would stay off them.  2.  Abdominal pain.  Work-up per ER physician.  3.  Observe for now see what evolves.    Thank you for allowing me to participate in the care of Mary Alves. Feel free to contact me directly with any further questions or concerns.    Arnel Cee MD  [facility]  03/04/22  11:10 EST     My nurse was able to find the Zio patch results that was placed on patient when she was in [place].  It showed some brief runs of SVTs as well as a right bundle.  Dr. Jameson it was nice enough to review both the current rhythm strips as well as the Zio patch.  He is in agreement with my assessment at this point no further work-up from a cardiovascular standpoint is necessary.  I spoke with patient told her to stop all of her supplements follow-up with me in about 2 to 4 weeks.  At that time we will readjust her supplementations and see how she is clinically doing.  We will sign off from a cardiovascular standpoint please call if there is questions or issues.  Arnel Cee MD  359pm

## 2022-03-04 NOTE — CASE MANAGEMENT/SOCIAL WORK
Discharge Planning Assessment  [facility]     Patient Name: Mary Alves  MRN: [id number]  Today's Date: 3/4/2022    Admit Date: 3/4/2022     Discharge Needs Assessment     Row Name 03/04/22 1410       Living Environment    Lives With child(gary), dependent    Current Living Arrangements home/apartment/condo    Primary Care Provided by self    Provides Primary Care For child(gary)    Family Caregiver if Needed friend(s)    Quality of Family Relationships helpful; involved; supportive       Resource/Environmental Concerns    Resource/Environmental Concerns none    Transportation Concerns car, none       Transition Planning    Patient/Family Anticipates Transition to home    Patient/Family Anticipated Services at Transition none    Transportation Anticipated car, drives self       Discharge Needs Assessment    Readmission Within the Last 30 Days no previous admission in last 30 days    Equipment Currently Used at Home none    Concerns to be Addressed no discharge needs identified; denies needs/concerns at this time    Anticipated Changes Related to Illness none    Equipment Needed After Discharge none               Discharge Plan     Row Name 03/04/22 2072       Plan    Plan Patient plans to return home upon discharge, where she lives with her daughter in private residence. 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 She can provide her own discharge transportation. She is IND with IADL's at baseline. Uses [facility]. Denies DC needs.    Patient/Family in Agreement with Plan yes    Plan Comments Home without services upon discharge. Can provide her own transportation. No DC needs identified.              Continued Care and Services - Admitted Since 3/4/2022    Coordination has not been started for this encounter.          Demographic Summary     Row Name 03/04/22 0884       General Information    Admission Type observation    Arrived From other (see comments)  Work.     Required Notices Provided Observation Status Notice    Expected Length of Stay (LOS) Less than 24 hours. Admitted to ED Observation Unit.    Referral Source admission list; emergency department    Reason for Consult discharge planning    Preferred Language English     Used During This Interaction no    General Information Comments Facesheet verified. Role of CCP explained. Appropriate PPE worn at all times.       Contact Information    Permission Granted to Share Info With family/designee    Contact Information Obtained for other (see comments)    Contact Information Comments Verified emergency contact information.               Functional Status     Row Name 03/04/22 1409       Functional Status    Usual Activity Tolerance excellent    Current Activity Tolerance excellent       Functional Status, IADL    Medications independent    Meal Preparation independent    Housekeeping independent    Laundry independent    Shopping independent    IADL Comments IND with IADL's.       Mental Status    General Appearance WDL WDL       Mental Status Summary    Recent Changes in Mental Status/Cognitive Functioning no changes       Employment/    Employment Status employed full-time    Current or Previous Occupation healthcare    Employment/ Comments ICU RN               Psychosocial     Row Name 03/04/22 1402       Values/Beliefs    Spiritual, Cultural Beliefs, Buddhism Practices, Values that Affect Care no       Behavior WDL    Behavior WDL WDL       Emotion Mood WDL    Emotion/Mood/Affect WDL WDL       Speech WDL    Speech WDL WDL       Perceptual State WDL    Perceptual State WDL WDL       Thought Process WDL    Thought Process WDL WDL       Intellectual Performance WDL    Intellectual Performance WDL WDL       Coping/Stress    Major Change/Loss/Stressor none    Patient Personal Strengths able to adapt; expressive of emotions; expressive of needs; strong support system    Sources of Support adult  child(gary)    Reaction to Health Status accepting; adjusting    Understanding of Condition and Treatment adequate understanding of medical condition; adequate understanding of treatment       Developmental Stage (Eriksson's)    Developmental Stage Stage 7 (35-65 years/Middle Adulthood) Generativity vs. Stagnation               Abuse/Neglect    No documentation.                Legal    No documentation.                Substance Abuse    No documentation.                Patient Forms    No documentation.                   Williams Redmond RN

## 2022-03-04 NOTE — ED PROVIDER NOTES
EMERGENCY DEPARTMENT ENCOUNTER    Room Number:  111/1  Date of encounter:  3/4/2022  PCP: Provider, No Known  Historian: Patient      HPI:  Chief Complaint: Midepigastric pain  A complete HPI/ROS/PMH/PSH/SH/FH are unobtainable due to: Not applicable  Context: Mary Alves is a 46 y.o. female who presents to the ED c/o midepigastric pain that started at about 7:30 AM.  She described it is a sharp pain and tightness in the midepigastric region.  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  She ate 3 eggs and breakfast about an hour prior to arrival at about 6 AM.  When she was upstairs the pain was severe.  She had diaphoresis and felt palpitations.  She had a near syncopal episode.  They checked her blood pressure and was 80 systolic.  She also had nausea.  They sent her down here to the emergency department.  She states that in September when she was in [facility] had a similar episode but not quite as bad.  They did a Zio patch on her and did an echo.  She states the echo was okay.  She never found out the results of the Zio patch because she was on a traveling nurse contract and returned here to [place].  She works out regularly.  She does cardiac work workout consist of elliptical  and lifts weights.  No episodes of exertional pain.  She is never had a DVT or PE in the past.  She is unaware of having any history of any heart problems.  When the pain was severe she did have some shortness of breath but that shortness of breath has resolved.  She has had no coughs or colds or fevers or chills.  Surgery in the past consist of a bladder scan,  and tube removal.  She is currently having rare and episodic periods.  She believes she is going through the change in starting menopause.        Previous Episodes: 1 time in September.  Current Symptoms: Midepigastric pain    MEDICAL HISTORY REVIEWED        PAST MEDICAL HISTORY  Active Ambulatory Problems     Diagnosis  Date Noted   • No Active Ambulatory Problems     Resolved Ambulatory Problems     Diagnosis Date Noted   • No Resolved Ambulatory Problems     Past Medical History:   Diagnosis Date   • Asthma    • Ovarian cyst          PAST SURGICAL HISTORY  Past Surgical History:   Procedure Laterality Date   • AUGMENTATION MAMMAPLASTY     • BLADDER REPAIR     • BREAST BIOPSY     •  SECTION      Twins   • KNEE SURGERY Right    • OOPHORECTOMY           FAMILY HISTORY  Family History   Problem Relation Age of Onset   • Brain cancer Father    • Heart attack Father    • Heart attack Mother    • Colon cancer Mother          SOCIAL HISTORY  Social History     Socioeconomic History   • Marital status:    Tobacco Use   • Smoking status: Never Smoker   • Smokeless tobacco: Never Used   Substance and Sexual Activity   • Drug use: No         ALLERGIES  Patient has no known allergies.        REVIEW OF SYSTEMS  Review of Systems     All systems reviewed and negative except for those discussed in HPI.       PHYSICAL EXAM    I have reviewed the triage vital signs and nursing notes.    ED Triage Vitals   Temp Pulse Resp BP SpO2   -- -- -- -- --      Temp src Heart Rate Source Patient Position BP Location FiO2 (%)   -- -- -- -- --       GENERAL: Female appears little uncomfortable.  No acute respiratory or cardiovascular distress.Vital signs on my initial evaluation O2 sats 100% on room air.  Blood pressure is normal at 135/85.  Heart rate is in the 60s.  HENT: nares patent  Head/neck/ face are symmetric without gross deformity, signs of trauma, or swelling  EYES: no scleral icterus, no conjunctival pallor.  NECK: Supple, no meningismus  CV: regular rhythm, regular rate with intact distal pulses.  No murmur rub  RESPIRATORY: normal effort and no respiratory distress.  Lear to auscultation bilaterally  ABDOMEN: soft and tenderness in the midepigastric region.  Pain is reproducible.  Negative Martin sign.  Normal bowel sounds.  She  has intact femoral pulses that are equal strong and symmetric.  MUSCULOSKELETAL: no deformity.  Intact distal pulses to dorsalis pedis and posterior tib bilaterally.  No coolness, cyanosis or pallor.  No motor or sensory changes to her distal extremities.  NEURO: alert and appropriate, moves all extremities, follows commands.  No focal motor or sensory changes.  SKIN: warm, dry    Vital signs and nursing notes reviewed.        LAB RESULTS  Recent Results (from the past 24 hour(s))   ECG 12 Lead    Collection Time: 03/04/22  8:24 AM   Result Value Ref Range    QT Interval 410 ms   Comprehensive Metabolic Panel    Collection Time: 03/04/22  8:33 AM    Specimen: Blood   Result Value Ref Range    Glucose 104 (H) 65 - 99 mg/dL    BUN 9 6 - 20 mg/dL    Creatinine 0.69 0.57 - 1.00 mg/dL    Sodium 142 136 - 145 mmol/L    Potassium 3.8 3.5 - 5.2 mmol/L    Chloride 101 98 - 107 mmol/L    CO2 29.1 (H) 22.0 - 29.0 mmol/L    Calcium 9.6 8.6 - 10.5 mg/dL    Total Protein 7.2 6.0 - 8.5 g/dL    Albumin 4.90 3.50 - 5.20 g/dL    ALT (SGPT) 25 1 - 33 U/L    AST (SGOT) 29 1 - 32 U/L    Alkaline Phosphatase 74 39 - 117 U/L    Total Bilirubin 0.4 0.0 - 1.2 mg/dL    Globulin 2.3 gm/dL    A/G Ratio 2.1 g/dL    BUN/Creatinine Ratio 13.0 7.0 - 25.0    Anion Gap 11.9 5.0 - 15.0 mmol/L    eGFR 107.9 >60.0 mL/min/1.73   Protime-INR    Collection Time: 03/04/22  8:33 AM    Specimen: Blood   Result Value Ref Range    Protime 14.4 (H) 11.7 - 14.2 Seconds    INR 1.12 (H) 0.90 - 1.10   Troponin    Collection Time: 03/04/22  8:33 AM    Specimen: Blood   Result Value Ref Range    Troponin T <0.010 0.000 - 0.030 ng/mL   BNP    Collection Time: 03/04/22  8:33 AM    Specimen: Blood   Result Value Ref Range    proBNP 58.2 0.0 - 450.0 pg/mL   hCG, Serum, Qualitative    Collection Time: 03/04/22  8:33 AM    Specimen: Blood   Result Value Ref Range    HCG Qualitative Negative Negative   Lipase    Collection Time: 03/04/22  8:33 AM    Specimen: Blood   Result  Value Ref Range    Lipase 30 13 - 60 U/L   Magnesium    Collection Time: 03/04/22  8:33 AM    Specimen: Blood   Result Value Ref Range    Magnesium 1.8 1.6 - 2.6 mg/dL   CBC Auto Differential    Collection Time: 03/04/22  8:34 AM    Specimen: Blood   Result Value Ref Range    WBC 5.68 3.40 - 10.80 10*3/mm3    RBC 4.32 3.77 - 5.28 10*6/mm3    Hemoglobin 13.3 12.0 - 15.9 g/dL    Hematocrit 38.9 34.0 - 46.6 %    MCV 90.0 79.0 - 97.0 fL    MCH 30.8 26.6 - 33.0 pg    MCHC 34.2 31.5 - 35.7 g/dL    RDW 12.8 12.3 - 15.4 %    RDW-SD 41.9 37.0 - 54.0 fl    MPV 8.4 6.0 - 12.0 fL    Platelets 215 140 - 450 10*3/mm3    Neutrophil % 58.5 42.7 - 76.0 %    Lymphocyte % 26.6 19.6 - 45.3 %    Monocyte % 8.5 5.0 - 12.0 %    Eosinophil % 5.3 0.3 - 6.2 %    Basophil % 0.9 0.0 - 1.5 %    Immature Grans % 0.2 0.0 - 0.5 %    Neutrophils, Absolute 3.33 1.70 - 7.00 10*3/mm3    Lymphocytes, Absolute 1.51 0.70 - 3.10 10*3/mm3    Monocytes, Absolute 0.48 0.10 - 0.90 10*3/mm3    Eosinophils, Absolute 0.30 0.00 - 0.40 10*3/mm3    Basophils, Absolute 0.05 0.00 - 0.20 10*3/mm3    Immature Grans, Absolute 0.01 0.00 - 0.05 10*3/mm3    nRBC 0.0 0.0 - 0.2 /100 WBC   ECG 12 Lead    Collection Time: 03/04/22  9:14 AM   Result Value Ref Range    QT Interval 432 ms   Troponin    Collection Time: 03/04/22 10:30 AM    Specimen: Blood   Result Value Ref Range    Troponin T <0.010 0.000 - 0.030 ng/mL   COVID-19,APTIMA PANTHER(NIC),[facility], NP/OP SWAB IN UTM/VTM/[facility] TRANSPORT MEDIA,24 HR TAT - Swab, Nasopharynx    Collection Time: 03/04/22 11:02 AM    Specimen: Nasopharynx; Swab   Result Value Ref Range    COVID19 Not Detected Not Detected - Ref. Range       Ordered the above labs and independently reviewed the results.        RADIOLOGY  CT Abdomen Pelvis With Contrast, CT Angiogram Chest    Result Date: 3/4/2022  CT ANGIOGRAM OF THE CHEST. MULTIPLE CORONAL, SAGITTAL, AND 3-D RECONSTRUCTIONS. CT ABDOMEN AND PELVIS WITH IV CONTRAST  HISTORY: 46-year-old  female with midepigastric pain. Chest pain.  TECHNIQUE: Radiation dose reduction techniques were utilized, including automated exposure control and exposure modulation based on body size. CT angiogram of the chest was performed. Multiple coronal, sagittal, and 3-D reconstruction images were obtained. Subsequently, 3 mm images were obtained through the abdomen and pelvis after the administration of IV contrast. There is no previous CT for comparison.  FINDINGS: CHEST CTA: There is adequate opacification of the pulmonary arteries and there is no convincing evidence for pulmonary thromboemboli. There are no pulmonary airspace consolidations and there are no pleural or pericardial effusions. There is no lymphadenopathy within the chest.  ABDOMEN/PELVIS: The liver and gallbladder appear unremarkable. There is mild periportal edema likely related to intravenous fluids. Splenic size is normal. The pancreas, adrenals, and kidneys appear unremarkable. Gastric folds appear thickened at the gastric antrum. The small bowel appears unremarkable and no acute colonic abnormality is seen. Appendix appears normal. Uterus and adnexa appear unremarkable. A vaginal suppository is noted in place. There is no free fluid or lymphadenopathy.      1. There is no evidence for pulmonary thromboemboli. No acute abnormality is seen within the chest. 2. Mild gastritis is suspected. No acute abnormality is otherwise seen within the abdomen or pelvis.  Discussed with Dr. Valverde.   This report was finalized on 3/4/2022 2:07 PM by Dr. Chio Cabrera M.D.      US Gallbladder    Result Date: 3/4/2022   GALLBLADDER-  Clinical: Abdominal pain  Correlation with 3/4/2022 CT abdomen  FINDINGS: Visualized portions of the pancreas within normal limits. The right kidney is 11.5 cm in length and normal in appearance. No calculus or obstructive uropathy. No free fluid is seen within the right upper quadrant.  No focal liver lesion is demonstrated. Echotexture  is perhaps slightly greater than anticipated, question fatty infiltration.  Gallbladder is normal. No wall thickening, pericholecystic fluid or gallstone. No biliary duct dilatation, CBD diameter is 7 mm.  CONCLUSION: 1. Liver echotexture suggests possible fatty infiltration. 2. The gallbladder is normal, no biliary duct dilatation.  This report was finalized on 3/4/2022 12:05 PM by Dr. José Miguel Jose M.D.      XR Chest 1 View    Result Date: 3/4/2022  XR CHEST 1 VW-  03/04/2022  HISTORY: Mid epigastric pain.  Heart size appears within normal limits. Skinfold and increased soft tissue projects over the right mid hemithorax. This produces some attenuation of the normal mid to lower right lung markings. Similar but less prominent findings seen on the left  No focal infiltrates are thought to be present.  No pneumothorax is seen.      No acute process is thought to be present. There is some soft tissue attenuation of the mid to lower right lung markings.  This report was finalized on 3/4/2022 9:36 AM by Dr. Charles Daniels M.D.        I ordered the above noted radiological studies. Reviewed by me and discussed with radiologist.  See dictation for official radiology interpretation.      PROCEDURES    Procedures      MEDICATIONS GIVEN IN ER    Medications   sodium chloride 0.9 % flush 10 mL (has no administration in time range)   sodium chloride 0.9 % flush 10 mL (10 mL Intravenous Given 3/4/22 1222)   sodium chloride 0.9 % flush 10 mL (has no administration in time range)   acetaminophen (TYLENOL) tablet 650 mg (has no administration in time range)     Or   acetaminophen (TYLENOL) 160 MG/5ML solution 650 mg (has no administration in time range)     Or   acetaminophen (TYLENOL) suppository 650 mg (has no administration in time range)   nitroglycerin (NITROSTAT) SL tablet 0.4 mg (has no administration in time range)   sodium chloride 0.9 % infusion (100 mL/hr Intravenous New Bag 3/4/22 1234)   ondansetron (ZOFRAN)  injection 4 mg (has no administration in time range)   pantoprazole (PROTONIX) injection 40 mg (has no administration in time range)   sodium chloride 0.9 % bolus 1,000 mL (0 mL Intravenous Stopped 3/4/22 1029)   fentaNYL citrate (PF) (SUBLIMAZE) injection 50 mcg (50 mcg Intravenous Given 3/4/22 0850)   ondansetron (ZOFRAN) injection 4 mg (4 mg Intravenous Given 3/4/22 0843)   aspirin chewable tablet 324 mg (324 mg Oral Given 3/4/22 0842)   iopamidol (ISOVUE-300) 61 % injection 100 mL (100 mL Intravenous Given 3/4/22 0955)   fentaNYL citrate (PF) (SUBLIMAZE) injection 50 mcg (50 mcg Intravenous Given 3/4/22 1106)         PROGRESS, DATA ANALYSIS, CONSULTS, AND MEDICAL DECISION MAKING    Differential diagnosis includes   - hepatobiliary pathology such as cholecystitis, cholangitis, and symptomatic cholelithiasis  -PUD  -Mesenteric ischemia  - Pancreatitis  - Dyspepsia  - Small bowel or large bowel obstruction  - Appendicitis  - Diverticulitis  - UTI including pyelonephritis  - Ureteral stone  - Zoster  - Colitis, including infectious and ischemic  - Atypical ACS  1 patient about my concerns about the EKG and the fact that I will consult cardiology.  Informed patient of the initial test that we will order and treatment will start.  She agrees with that plan.  All questions answered at this time.    We are currently under a pandemic from the COVID19 infection.  The patient presented to the emergency department by ambulance or personal vehicle. I followed the current protocols required by Infection Control at [facility] in my evaluation and treatment of the patient. The patient was wearing a face mask during my evaluation and throughout my encounter. During my whole encounter with this patient I used appropriate personal protective equipment.  This equipment consisted of eye protection, facemask, gown, and gloves.  I applied this equipment before entering the room.      All labs have been independently  reviewed by me.  All radiology studies have been reviewed by me and discussed with radiologist dictating the report.   EKG's independently viewed and interpreted by me.  Discussion below represents my analysis of pertinent findings related to patient's condition, differential diagnosis, treatment plan and final disposition.      ED Course as of 03/04/22 1644   Fri Mar 04, 2022   0841 EKG readingEKG was done at 824Rate of 63 is normal sinus rhythm is narrow complex there are some subtle J-point elevation in lead II and III and one complex.  Seems to be not as elevated and the second complex seen in those leads.  I do not see any obvious reciprocal changes or ST depression.  I see flipped T waves in V1 V2 and V3 which is nonspecific and flattening of T waves in 1 and aVLQT looks normalI compared to an EKG from March 3, 2020 and this changes are subtle and mild compared to previous EKG. [MM]   0919 Second EKG was done at 914Rate of 63Is normal sinus rhythm with a narrow complexSome nonspecific T wave changes in V1 V2 V3QT looks normalWhen I compared to the first EKG that subtle ST elevation is resolved.  This looks very similar to her EKG that she had intact 2020 that I compared the initial EKG to. [MM]   0920 I spoke with Dr. Almaraz.  He is looked at the EKG and he is currently at bedside evaluating the patient. [MM]   0934 Dr. Almaraz has seen and evaluated.  I spoke with him.  He is concerned this is more coming from her abdomen.  He did look at the rhythm strip and previous strip she had on her phone.  Peers if potentially was in a junctional block briefly on the rhythm strip from upstairs.  History of episodic right bundle branch block in the past on monitor strips that she has on her iPhone. [MM]   1025 I have reevaluated the patient after she returned from CT scan.  Currently her pain is much better and almost 100% resolved.  She is in no distress.  Informed her of the initial results of the blood work.  CT  scan results are pending. [MM]   1034 Pain started to come back in the midepigastric region.  It is focal and reproducible.I informed her of the results of the CT scans and my conversation with Dr. Cabrera.  I recommend that we bring her in for observation.  We are getting repeat heart enzyme as well.  I think this is likely gastroenterology in etiology of her symptoms and unlikely a cardiac etiology. [MM]   1035 I talked with Dr. Cabrera the radiologist concerning the CT scan of the chest and abdomen pelvis.  On the CT of the chest there is no PE and no acute process.  On the CT scan of the abdomen pelvis there appears to be some mild gastritis.  But otherwise no acute abnormality appreciated.  Please see complete dictated report. [MM]   1055 I spoke with Marilu who is in the observation unit.  She agrees to admit the patient to the observation unit.  I informed her of the patient's initial presenting symptoms and results of test.  All questions answered. [MM]   1055 I spoke with Dr. Beauchamp who is the intensive care unit physician who rendered aid to the patient upstairs when she started feeling sick. [MM]   1056   He noticed that on the monitor she had a junctional bradycardia.  She was diaphoretic and weak and pale.  She was sent down here for further evaluation and treatment. [MM]   1103 For verification I did look at the rhythm strip that was done at 758 upstairs in the ICU.  Patient had a junctional rhythm with a rate about 50s. [MM]      ED Course User Index  [MM] Mango Valverde MD       AS OF 16:44 EST VITALS:    BP - 113/69  HR - 57  TEMP - 98 °F (36.7 °C) (Oral)  02 SATS - 100%        DIAGNOSIS  Final diagnoses:   Epigastric pain   Junctional rhythm: Transient and resolved         DISPOSITION  Patient be admitted to a monitored bed in the observation unit.      DICTATED UTILIZING [place] DICTATION     Mango Valverde MD  03/04/22 1985

## 2022-03-04 NOTE — DISCHARGE INSTRUCTIONS
Return to the emergency department with worsening symptoms, uncontrolled pain, inability to tolerate oral liquids, fever greater than 105°F not controlled by Tylenol and ibuprofen or as needed with emergent concerns.    You may take over the counter Pepcid BID for mild gastritis seen on CT today. Follow-up with GI as needed.

## 2022-03-04 NOTE — ED NOTES
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 Pt also began to feel hot and diaphoretic with nausea. Coworkers checked her BP and reports SBP in the 80's. Pt only complaints currently is abd pain. Pt a&ox4, abc's intact, NAD noted.    Pt noted to have mask on when this RN entered the room.  This RN wore appropriate PPE throughout our encounter. Hand hygiene performed upon entering and exiting room.         Meghan Minor, RN  03/04/22 3588     constipation

## 2022-03-07 ENCOUNTER — OFFICE VISIT (OUTPATIENT)
Dept: OBSTETRICS AND GYNECOLOGY | Facility: CLINIC | Age: 47
End: 2022-03-07

## 2022-03-07 VITALS
SYSTOLIC BLOOD PRESSURE: 118 MMHG | HEIGHT: 69 IN | DIASTOLIC BLOOD PRESSURE: 67 MMHG | BODY MASS INDEX: 23.99 KG/M2 | WEIGHT: 162 LBS

## 2022-03-07 DIAGNOSIS — Z00.00 ANNUAL PHYSICAL EXAM: ICD-10-CM

## 2022-03-07 DIAGNOSIS — E03.9 HYPOTHYROIDISM (ACQUIRED): ICD-10-CM

## 2022-03-07 DIAGNOSIS — R10.13 EPIGASTRIC PAIN: Primary | ICD-10-CM

## 2022-03-07 PROCEDURE — 99396 PREV VISIT EST AGE 40-64: CPT | Performed by: OBSTETRICS & GYNECOLOGY

## 2022-03-07 RX ORDER — ESTRADIOL 0.75 MG/1.25G
1.25 GEL, METERED TOPICAL DAILY
Qty: 50 G | Refills: 12 | Status: SHIPPED | OUTPATIENT
Start: 2022-03-07 | End: 2023-03-07

## 2022-03-07 RX ORDER — PROGESTERONE 100 MG/1
100 CAPSULE ORAL DAILY
Qty: 30 CAPSULE | Refills: 11 | Status: SHIPPED | OUTPATIENT
Start: 2022-03-07 | End: 2022-03-28

## 2022-03-07 NOTE — PROGRESS NOTES
HPI   Mary Alves  is a 46 y.o. female who presents for several reasons.  First, she would like to have a routine gynecologic exam.  She has been traveling in [place] and [place] for work.  Bowels and bladder are functioning normally.  She has been menopausal for several years.  Last mammogram was several years ago.  Next, the patient has a recent episode of epigastric pain.  We reviewed her emergency room visit and discussed it.  CT scan was negative.  Laboratory evaluation was also nonfocal.  No further episodes.  Next, the patient has been taking Estrogel and Prometrium for hormone replacement.  She is very satisfied with this.  Next, the patient is hypothyroid.  She no longer has a primary care provider.    Chief Complaint   Patient presents with   • Gynecologic Exam     Patient is here for a annual.       Past Medical History:   Diagnosis Date   • Asthma    • Ovarian cyst        Past Surgical History:   Procedure Laterality Date   • AUGMENTATION MAMMAPLASTY     • BLADDER REPAIR     • BREAST BIOPSY     •  SECTION      Twins   • KNEE SURGERY Right    • OOPHORECTOMY         Social History     Socioeconomic History   • Marital status:    Tobacco Use   • Smoking status: Never Smoker   • Smokeless tobacco: Never Used   Substance and Sexual Activity   • Drug use: No       The following portions of the patient's history were reviewed and updated as appropriate: allergies, current medications, past family history, past medical history, past social history, past surgical history and problem list.    Review of Systems   Constitutional: Negative.    HENT: Negative.    Respiratory: Negative.    Cardiovascular: Negative.    Gastrointestinal: Negative.    Genitourinary: Negative.    Musculoskeletal: Negative.    Skin: Negative.    Allergic/Immunologic: Negative.    Psychiatric/Behavioral: Negative.              Physical Exam  Vitals and nursing note reviewed.   Constitutional:       Appearance: She  is well-developed.   HENT:      Head: Normocephalic and atraumatic.   Cardiovascular:      Rate and Rhythm: Normal rate and regular rhythm.   Pulmonary:      Effort: Pulmonary effort is normal.      Breath sounds: Normal breath sounds. No wheezing or rales.   Chest:      Comments: Implants are in place bilaterally.  There are no palpable breast lumps.  Nipple discharge and axillary adenopathy are absent.  Abdominal:      General: There is no distension.      Palpations: Abdomen is soft.      Tenderness: There is no abdominal tenderness.   Genitourinary:     Labia:         Right: No lesion.         Left: No lesion.       Vagina: Normal. No vaginal discharge.      Cervix: No cervical motion tenderness.      Uterus: Normal. Not enlarged and not tender.       Adnexa:         Right: No mass or tenderness.          Left: No mass or tenderness.     Skin:     General: Skin is warm and dry.   Neurological:      Mental Status: She is alert and oriented to person, place, and time.         Assessment    Diagnoses and all orders for this visit:    1. Epigastric pain (Primary)  -     Ambulatory Referral to Gastroenterology    2. Annual physical exam    3. Hypothyroidism (acquired)  -     Ambulatory Referral to Family Practice        Plan  1.     2. Return in about 1 year (around 3/7/2023).    Social History     Tobacco Use   Smoking Status Never Smoker   Smokeless Tobacco Never Used   3.

## 2022-03-18 ENCOUNTER — APPOINTMENT (OUTPATIENT)
Dept: WOMENS IMAGING | Facility: HOSPITAL | Age: 47
End: 2022-03-18

## 2022-03-18 ENCOUNTER — PROCEDURE VISIT (OUTPATIENT)
Dept: OBSTETRICS AND GYNECOLOGY | Facility: CLINIC | Age: 47
End: 2022-03-18

## 2022-03-18 DIAGNOSIS — Z12.31 VISIT FOR SCREENING MAMMOGRAM: Primary | ICD-10-CM

## 2022-03-18 PROCEDURE — 77067 SCR MAMMO BI INCL CAD: CPT | Performed by: RADIOLOGY

## 2022-03-18 PROCEDURE — 77063 BREAST TOMOSYNTHESIS BI: CPT | Performed by: RADIOLOGY

## 2022-03-18 PROCEDURE — 77067 SCR MAMMO BI INCL CAD: CPT | Performed by: OBSTETRICS & GYNECOLOGY

## 2022-03-18 PROCEDURE — 77063 BREAST TOMOSYNTHESIS BI: CPT | Performed by: OBSTETRICS & GYNECOLOGY

## 2022-03-28 ENCOUNTER — OFFICE VISIT (OUTPATIENT)
Dept: GASTROENTEROLOGY | Facility: CLINIC | Age: 47
End: 2022-03-28

## 2022-03-28 ENCOUNTER — PREP FOR SURGERY (OUTPATIENT)
Dept: SURGERY | Facility: SURGERY CENTER | Age: 47
End: 2022-03-28

## 2022-03-28 VITALS
TEMPERATURE: 98 F | HEIGHT: 69 IN | WEIGHT: 167.8 LBS | OXYGEN SATURATION: 99 % | SYSTOLIC BLOOD PRESSURE: 112 MMHG | DIASTOLIC BLOOD PRESSURE: 68 MMHG | BODY MASS INDEX: 24.85 KG/M2 | HEART RATE: 73 BPM

## 2022-03-28 DIAGNOSIS — R93.5 ABNORMAL CT OF THE ABDOMEN: ICD-10-CM

## 2022-03-28 DIAGNOSIS — R10.13 EPIGASTRIC PAIN: Primary | ICD-10-CM

## 2022-03-28 DIAGNOSIS — R10.10 PAIN OF UPPER ABDOMEN: Primary | ICD-10-CM

## 2022-03-28 DIAGNOSIS — R10.13 EPIGASTRIC PAIN: ICD-10-CM

## 2022-03-28 PROCEDURE — 99204 OFFICE O/P NEW MOD 45 MIN: CPT | Performed by: INTERNAL MEDICINE

## 2022-03-28 RX ORDER — PREDNISONE 20 MG/1
40 TABLET ORAL DAILY
COMMUNITY
Start: 2022-03-17 | End: 2022-05-09

## 2022-03-28 RX ORDER — SODIUM CHLORIDE, SODIUM LACTATE, POTASSIUM CHLORIDE, CALCIUM CHLORIDE 600; 310; 30; 20 MG/100ML; MG/100ML; MG/100ML; MG/100ML
30 INJECTION, SOLUTION INTRAVENOUS CONTINUOUS PRN
Status: CANCELLED | OUTPATIENT
Start: 2022-03-28

## 2022-03-28 RX ORDER — SODIUM CHLORIDE 0.9 % (FLUSH) 0.9 %
10 SYRINGE (ML) INJECTION AS NEEDED
Status: CANCELLED | OUTPATIENT
Start: 2022-03-28

## 2022-03-28 RX ORDER — SODIUM CHLORIDE 0.9 % (FLUSH) 0.9 %
3 SYRINGE (ML) INJECTION EVERY 12 HOURS SCHEDULED
Status: CANCELLED | OUTPATIENT
Start: 2022-03-28

## 2022-03-28 NOTE — PATIENT INSTRUCTIONS
If pain returns, start nexium or OTC prilosec.     If pain returns, please call the office and we can order HIDA scan.     Follow up with Dr Bustillo for repeat colonoscopy this year and let us know if we may be of assistance in the future.

## 2022-03-28 NOTE — PROGRESS NOTES
"Chief Complaint   Patient presents with   • Abnormal Lab   abdominal pain        History of Present Illness  46 year old female presents today for abdominal pain. This is started out of the blue with associated nausea, no vomiting.     No NSAID use.     She had colonoscopy September 2021 with 4 polyps, recommend for repeat in one year.     She takes work out supplements and after cardiology cleared her, it was recommend to avoid them after the episode of abdominal pain, she has not been taking them since the episode.     She went to the ER and CTA was negative, labs were relatively unremarkable, CT abdomen and pelvis showed gastritis. She is not currently on acid Rx.       Review of Systems     Result Review :       US Gallbladder (03/04/2022 11:55)  Fatty liver, nl gallbladder  CT Abdomen Pelvis With Contrast (03/04/2022 09:58), mild gastritis  CBC & Differential (03/04/2022 08:34) nl          Vital Signs:   /68   Pulse 73   Temp 98 °F (36.7 °C)   Ht 175.3 cm (69\")   Wt 76.1 kg (167 lb 12.8 oz)   SpO2 99%   BMI 24.78 kg/m²     Body mass index is 24.78 kg/m².     Physical Exam  Vitals reviewed.   Constitutional:       Appearance: Normal appearance.   HENT:      Nose: No nasal deformity.   Eyes:      General: No scleral icterus.  Neck:      Comments: Trachea midline.  Cardiovascular:      Rate and Rhythm: Normal rate and regular rhythm.   Pulmonary:      Effort: No respiratory distress.      Breath sounds: Normal breath sounds.   Abdominal:      General: Bowel sounds are normal. There is no distension.      Palpations: Abdomen is soft. There is no mass.      Tenderness: There is no abdominal tenderness.   Lymphadenopathy:      Comments: No periumbilical lymphadenopathy.   Skin:     General: Skin is warm.   Neurological:      Mental Status: She is alert.           Assessment and Plan    Diagnoses and all orders for this visit:    1. Epigastric pain (Primary)    2. Abnormal CT of the " abdomen  Comments:  gastritis       If pain returns, start nexium or OTC prilosec.     If pain returns, please call the office and we can order HIDA scan.     Follow up with Dr Bustillo for repeat colonoscopy this year and let us know if we may be of assistance in the future.           I have reviewed and confirmed the accuracy of the HPI and Assessment and Plan as documented by the APRN VANCE Huang        Follow Up   Return for at procedures.    Patient Instructions   If pain returns, start nexium or OTC prilosec.     If pain returns, please call the office and we can order HIDA scan.     Follow up with Dr Bustillo for repeat colonoscopy this year and let us know if we may be of assistance in the future.

## 2022-03-29 PROBLEM — R93.5 ABNORMAL CT OF THE ABDOMEN: Status: ACTIVE | Noted: 2022-03-29

## 2022-05-09 ENCOUNTER — LAB (OUTPATIENT)
Dept: LAB | Facility: HOSPITAL | Age: 47
End: 2022-05-09

## 2022-05-09 ENCOUNTER — LAB (OUTPATIENT)
Dept: LAB | Facility: SURGERY CENTER | Age: 47
End: 2022-05-09

## 2022-05-09 DIAGNOSIS — R10.13 EPIGASTRIC PAIN: ICD-10-CM

## 2022-05-09 DIAGNOSIS — R10.10 PAIN OF UPPER ABDOMEN: ICD-10-CM

## 2022-05-09 DIAGNOSIS — R93.5 ABNORMAL CT OF THE ABDOMEN: ICD-10-CM

## 2022-05-09 LAB — SARS-COV-2 ORF1AB RESP QL NAA+PROBE: NOT DETECTED

## 2022-05-09 PROCEDURE — U0004 COV-19 TEST NON-CDC HGH THRU: HCPCS

## 2022-05-09 NOTE — SIGNIFICANT NOTE
Education provided the Patient on the following:    - Nothing to Eat or Drink after MN the night before the procedure  -Your required COVID Test is Scheduled on 5/9 Between the Hours of 0415-6643  -You will only be notified if your COVID test Result is POSITIVE  -The importance of reducing your number of contacts by self quarantining after you COVID test until the date of your EGD  -You will need to have someone drive you home after your EGD and remain with you for 24 hours after the EGD  - The date of your EGD, your are welcome to have one visitor at bedside or remain within 10-15 minutes of Psychiatric  -Please wear warm socks when you arrive for your EGD  -Remove all jewelry and leave any valuables before arriving on the date of your procedure (all will have to be removed before leaving preop)  -You will need to arrive at 1315 on 5/11  EGD  -Feel free to contact us at: [phone] with any additional questions/concerns

## 2022-05-11 ENCOUNTER — ANESTHESIA (OUTPATIENT)
Dept: SURGERY | Facility: SURGERY CENTER | Age: 47
End: 2022-05-11

## 2022-05-11 ENCOUNTER — HOSPITAL ENCOUNTER (OUTPATIENT)
Facility: SURGERY CENTER | Age: 47
Setting detail: HOSPITAL OUTPATIENT SURGERY
Discharge: HOME | End: 2022-05-11
Attending: INTERNAL MEDICINE | Admitting: INTERNAL MEDICINE

## 2022-05-11 ENCOUNTER — ANESTHESIA EVENT (OUTPATIENT)
Dept: SURGERY | Facility: SURGERY CENTER | Age: 47
End: 2022-05-11

## 2022-05-11 VITALS
HEART RATE: 77 BPM | SYSTOLIC BLOOD PRESSURE: 113 MMHG | OXYGEN SATURATION: 97 % | WEIGHT: 160 LBS | BODY MASS INDEX: 23.7 KG/M2 | HEIGHT: 69 IN | TEMPERATURE: 97.9 F | DIASTOLIC BLOOD PRESSURE: 78 MMHG | RESPIRATION RATE: 18 BRPM

## 2022-05-11 DIAGNOSIS — R10.13 EPIGASTRIC PAIN: ICD-10-CM

## 2022-05-11 DIAGNOSIS — R10.10 PAIN OF UPPER ABDOMEN: ICD-10-CM

## 2022-05-11 DIAGNOSIS — R93.5 ABNORMAL CT OF THE ABDOMEN: ICD-10-CM

## 2022-05-11 LAB
B-HCG UR QL: NEGATIVE
EXPIRATION DATE: NORMAL
INTERNAL NEGATIVE CONTROL: NEGATIVE
INTERNAL POSITIVE CONTROL: POSITIVE
Lab: NORMAL

## 2022-05-11 PROCEDURE — 43239 EGD BIOPSY SINGLE/MULTIPLE: CPT | Performed by: INTERNAL MEDICINE

## 2022-05-11 PROCEDURE — 25010000002 ONDANSETRON PER 1 MG: Performed by: ANESTHESIOLOGY

## 2022-05-11 PROCEDURE — 81025 URINE PREGNANCY TEST: CPT | Performed by: NURSE PRACTITIONER

## 2022-05-11 PROCEDURE — 87081 CULTURE SCREEN ONLY: CPT | Performed by: INTERNAL MEDICINE

## 2022-05-11 PROCEDURE — 25010000002 PROPOFOL 10 MG/ML EMULSION: Performed by: ANESTHESIOLOGY

## 2022-05-11 PROCEDURE — 88305 TISSUE EXAM BY PATHOLOGIST: CPT | Performed by: INTERNAL MEDICINE

## 2022-05-11 RX ORDER — LIDOCAINE HYDROCHLORIDE 20 MG/ML
INJECTION, SOLUTION INFILTRATION; PERINEURAL AS NEEDED
Status: DISCONTINUED | OUTPATIENT
Start: 2022-05-11 | End: 2022-05-11 | Stop reason: SURG

## 2022-05-11 RX ORDER — ONDANSETRON 2 MG/ML
INJECTION INTRAMUSCULAR; INTRAVENOUS AS NEEDED
Status: DISCONTINUED | OUTPATIENT
Start: 2022-05-11 | End: 2022-05-11 | Stop reason: SURG

## 2022-05-11 RX ORDER — PROPOFOL 10 MG/ML
VIAL (ML) INTRAVENOUS AS NEEDED
Status: DISCONTINUED | OUTPATIENT
Start: 2022-05-11 | End: 2022-05-11 | Stop reason: SURG

## 2022-05-11 RX ORDER — SODIUM CHLORIDE, SODIUM LACTATE, POTASSIUM CHLORIDE, CALCIUM CHLORIDE 600; 310; 30; 20 MG/100ML; MG/100ML; MG/100ML; MG/100ML
30 INJECTION, SOLUTION INTRAVENOUS CONTINUOUS PRN
Status: DISCONTINUED | OUTPATIENT
Start: 2022-05-11 | End: 2022-05-11 | Stop reason: HOSPADM

## 2022-05-11 RX ORDER — MAGNESIUM HYDROXIDE 1200 MG/15ML
LIQUID ORAL AS NEEDED
Status: DISCONTINUED | OUTPATIENT
Start: 2022-05-11 | End: 2022-05-11 | Stop reason: HOSPADM

## 2022-05-11 RX ORDER — SODIUM CHLORIDE 0.9 % (FLUSH) 0.9 %
10 SYRINGE (ML) INJECTION AS NEEDED
Status: DISCONTINUED | OUTPATIENT
Start: 2022-05-11 | End: 2022-05-11 | Stop reason: HOSPADM

## 2022-05-11 RX ORDER — SODIUM CHLORIDE 0.9 % (FLUSH) 0.9 %
3 SYRINGE (ML) INJECTION EVERY 12 HOURS SCHEDULED
Status: DISCONTINUED | OUTPATIENT
Start: 2022-05-11 | End: 2022-05-11 | Stop reason: HOSPADM

## 2022-05-11 RX ADMIN — PROPOFOL 120 MG: 10 INJECTION, EMULSION INTRAVENOUS at 12:30

## 2022-05-11 RX ADMIN — PROPOFOL 30 MG: 10 INJECTION, EMULSION INTRAVENOUS at 12:31

## 2022-05-11 RX ADMIN — LIDOCAINE HYDROCHLORIDE 60 MG: 20 INJECTION, SOLUTION INFILTRATION; PERINEURAL at 12:30

## 2022-05-11 RX ADMIN — PROPOFOL 40 MG: 10 INJECTION, EMULSION INTRAVENOUS at 12:33

## 2022-05-11 RX ADMIN — SODIUM CHLORIDE, POTASSIUM CHLORIDE, SODIUM LACTATE AND CALCIUM CHLORIDE 30 ML/HR: 600; 310; 30; 20 INJECTION, SOLUTION INTRAVENOUS at 12:10

## 2022-05-11 RX ADMIN — PROPOFOL 200 MCG/KG/MIN: 10 INJECTION, EMULSION INTRAVENOUS at 12:30

## 2022-05-11 RX ADMIN — ONDANSETRON 4 MG: 2 INJECTION INTRAMUSCULAR; INTRAVENOUS at 12:46

## 2022-05-11 NOTE — ANESTHESIA POSTPROCEDURE EVALUATION
"Patient: Mary Alves    Procedure Summary     Date: 05/11/22 Room / Location: [facility]    Anesthesia Start: 1224 Anesthesia Stop: 1242    Procedure: ESOPHAGOGASTRODUODENOSCOPY WITH BIOPSY (N/A ) Diagnosis:       Pain of upper abdomen      Abnormal CT of the abdomen      Epigastric pain      (Pain of upper abdomen [R10.10])      (Abnormal CT of the abdomen [R93.5])      (Epigastric pain [R10.13])    Surgeons: Dandre Varela MD Provider: Valente Calabrese MD    Anesthesia Type: MAC ASA Status: 1          Anesthesia Type: MAC    Vitals  Vitals Value Taken Time   /76 05/11/22 1323   Temp 36.6 °C (97.9 °F) 05/11/22 1244   Pulse 64 05/11/22 1323   Resp 18 05/11/22 1254   SpO2 99 % 05/11/22 1323   Vitals shown include unvalidated device data.        Post Anesthesia Care and Evaluation    Patient location during evaluation: bedside  Patient participation: complete - patient participated  Level of consciousness: awake and alert  Pain management: adequate  Airway patency: patent  Anesthetic complications: No anesthetic complications    Cardiovascular status: acceptable  Respiratory status: acceptable  Hydration status: acceptable    Comments: /78   Pulse 77   Temp 36.6 °C (97.9 °F) (Temporal)   Resp 18   Ht 175.3 cm (69\")   Wt 72.6 kg (160 lb)   LMP 04/26/2022   SpO2 97%   BMI 23.63 kg/m²       "

## 2022-05-11 NOTE — ANESTHESIA PREPROCEDURE EVALUATION
Anesthesia Evaluation     Patient summary reviewed   history of anesthetic complications: PONV  NPO Solid Status: > 8 hours  NPO Liquid Status: > 2 hours           Airway   Mallampati: I  TM distance: >3 FB  Neck ROM: full  Dental      Pulmonary     breath sounds clear to auscultation  (-) shortness of breath  Cardiovascular   Exercise tolerance: good (4-7 METS)    Rhythm: regular  Rate: normal    (-) angina, HDZ      Neuro/Psych  GI/Hepatic/Renal/Endo      Musculoskeletal     Abdominal    Substance History      OB/GYN          Other                        Anesthesia Plan    ASA 1     MAC   (MAC anesthesia discussed with patient and/or patient representative. Risks (including but not limited to intra-op awareness), benefits, and alternatives were discussed. Understanding was voiced with an agreement to proceed with a MAC technique and General as a backup option. I have explained other risks of anesthesia including but not limited to dental damage, corneal abrasion, nerve injury, MI, stroke, and death. All questions asked and answered.   )  intravenous induction     Anesthetic plan, all risks, benefits, and alternatives have been provided, discussed and informed consent has been obtained with: patient.        CODE STATUS:

## 2022-05-11 NOTE — H&P
No chief complaint on file.      HPI  Epigastric pain  Abnormal CT         Problem List:    Patient Active Problem List   Diagnosis   • Abdominal pain   • Abnormal CT of the abdomen       Medical History:    Past Medical History:   Diagnosis Date   • Asthma    • Ovarian cyst    • PONV (postoperative nausea and vomiting)         Social History:    Social History     Socioeconomic History   • Marital status:    Tobacco Use   • Smoking status: Never Smoker   • Smokeless tobacco: Never Used   Vaping Use   • Vaping Use: Never used   Substance and Sexual Activity   • Alcohol use: Yes     Comment: occasionally   • Drug use: No   • Sexual activity: Defer       Family History:   Family History   Problem Relation Age of Onset   • Colon polyps Mother    • Heart attack Mother    • Colon cancer Mother    • Brain cancer Father    • Heart attack Father    • Crohn's disease Neg Hx    • Irritable bowel syndrome Neg Hx    • Ulcerative colitis Neg Hx        Surgical History:   Past Surgical History:   Procedure Laterality Date   • AUGMENTATION MAMMAPLASTY     • BLADDER REPAIR     • BREAST BIOPSY     •  SECTION      Twins   • COLONOSCOPY     • KNEE SURGERY Right    • OOPHORECTOMY         No current facility-administered medications for this encounter.    Current Outpatient Medications:   •  Estradiol (Estrogel) 0.75 MG/1.25 GM (0.06%) topical gel, Place 1.25 g on the skin as directed by provider Daily., Disp: 50 g, Rfl: 12  •  levothyroxine (SYNTHROID, LEVOTHROID) 25 MCG tablet, Take 25 mcg by mouth Daily., Disp: , Rfl:   •  Multiple Vitamins-Minerals (MULTIVITAMIN ADULT PO), Take 1 tablet by mouth Daily., Disp: , Rfl:   •  OMEGA-3 FATTY ACIDS PO, Take 1 capsule by mouth Daily., Disp: , Rfl:   •  progesterone (PROMETRIUM) 100 MG capsule, Take 1 capsule by mouth Daily., Disp: 30 capsule, Rfl: 11    Allergies: No Known Allergies     The following portions of the patient's history were reviewed by me and updated as  appropriate: review of systems, allergies, current medications, past family history, past medical history, past social history, past surgical history and problem list.    There were no vitals filed for this visit.    PHYSICAL EXAM:    CONSTITUTIONAL:  today's vital signs reviewed by me  GASTROINTESTINAL: abdomen is soft nontender nondistended with normal active bowel sounds, no masses are appreciated    Assessment/ Plan  Epigastric pain  Abnormal CT    egd    Risks and benefits as well as alternatives to endoscopic evaluation were explained to the patient and they voiced understanding and wish to proceed.  These risks include but are not limited to the risk of bleeding, perforation, adverse reaction to sedation, and missed lesions.  The patient was given the opportunity to ask questions prior to the endoscopic procedure.

## 2022-05-12 LAB
LAB AP CASE REPORT: NORMAL
LAB AP CLINICAL INFORMATION: NORMAL
PATH REPORT.FINAL DX SPEC: NORMAL
PATH REPORT.GROSS SPEC: NORMAL

## 2022-05-13 LAB — UREASE TISS QL: NEGATIVE

## 2022-05-26 ENCOUNTER — OFFICE VISIT (OUTPATIENT)
Dept: FAMILY MEDICINE CLINIC | Facility: CLINIC | Age: 47
End: 2022-05-26

## 2022-05-26 VITALS
RESPIRATION RATE: 16 BRPM | SYSTOLIC BLOOD PRESSURE: 118 MMHG | DIASTOLIC BLOOD PRESSURE: 78 MMHG | TEMPERATURE: 97.8 F | BODY MASS INDEX: 23.99 KG/M2 | WEIGHT: 162 LBS | HEIGHT: 69 IN | OXYGEN SATURATION: 97 % | HEART RATE: 83 BPM

## 2022-05-26 DIAGNOSIS — E03.9 HYPOTHYROIDISM, ACQUIRED: Primary | ICD-10-CM

## 2022-05-26 DIAGNOSIS — R22.1 NECK MASS: ICD-10-CM

## 2022-05-26 DIAGNOSIS — R21 RASH AND NONSPECIFIC SKIN ERUPTION: ICD-10-CM

## 2022-05-26 DIAGNOSIS — F41.1 GENERALIZED ANXIETY DISORDER: ICD-10-CM

## 2022-05-26 DIAGNOSIS — K29.00 ACUTE GASTRITIS WITHOUT HEMORRHAGE, UNSPECIFIED GASTRITIS TYPE: ICD-10-CM

## 2022-05-26 DIAGNOSIS — E55.9 VITAMIN D DEFICIENCY: ICD-10-CM

## 2022-05-26 PROCEDURE — 99204 OFFICE O/P NEW MOD 45 MIN: CPT | Performed by: PHYSICIAN ASSISTANT

## 2022-05-26 RX ORDER — METHYLPREDNISOLONE 4 MG/1
TABLET ORAL
Qty: 21 TABLET | Refills: 11 | Status: SHIPPED | OUTPATIENT
Start: 2022-05-26 | End: 2022-07-14

## 2022-05-26 RX ORDER — FAMOTIDINE 20 MG/1
20 TABLET, FILM COATED ORAL DAILY
COMMUNITY

## 2022-05-26 RX ORDER — DESVENLAFAXINE SUCCINATE 50 MG/1
50 TABLET, EXTENDED RELEASE ORAL DAILY
Qty: 90 TABLET | Refills: 1 | Status: SHIPPED | OUTPATIENT
Start: 2022-05-26

## 2022-05-26 RX ORDER — LEVOTHYROXINE SODIUM 0.03 MG/1
25 TABLET ORAL DAILY
Qty: 90 TABLET | Refills: 3 | Status: SHIPPED | OUTPATIENT
Start: 2022-05-26

## 2022-05-26 RX ORDER — CLOBETASOL PROPIONATE 0.5 MG/G
CREAM TOPICAL
Qty: 60 G | Refills: 5 | Status: SHIPPED | OUTPATIENT
Start: 2022-05-26

## 2022-05-26 NOTE — PROGRESS NOTES
Subjective   Mary Alves is a 46 y.o. female.     History of Present Illness       Exercise  Had twins  TSH was 4.49 10-14-21  Dr Bustillo did colonoscopy---polyps--repeat 1 yr  Sees hand --Dr. Martinez----to do cubital tunnel and CTS----      Had echo 10-14-21  •  Left Ventricle: LV EF is 71 %, assessed by modified Eli's biplane.   Normal size and tthickness. Normal diastolic dysfunction   •  No significant vavlular pathology   •  No pericardial effusion  Sinus bradycardia EKG 10-14-21;  Had SVT  CT head 10-14-21 normal  CTA chest 10-14-21 No substantial abnormality in the body wall at the level of the thoracic inlet. Bilateral breast implants.   CTA chest 3-4-22;  Noted gastritis  CT abd/pelvis---mild gastritis  Excellent opacification of the pulmonary arteries without proximal cut off or contrast filling defect to suggest acute pulmonary embolism. No substantial thoracic aorta pathology. No mediastinal or hilar lymphadenopathy. Normal four-chamber cardiac size   without pericardial effusion. No substantial coronary calcifications. Unremarkable esophagus.   CT abd----neg  Sees GYN --on HRT Dr Kim  Lipids 10-14-21 LDL 71  Cardio note 3-4-22  Pt presented to ER with complaints of mid epigastric pain. Pt described it as sharp, tightness in mid epigastric region with some shortness of breath. 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 Pt reported she ate 3 eggs for breakfast about an hour before pain started. Pt had diaphoresis and palpitations with the pain. Pt had a near syncopal episode and when her blood pressure was checked it was in the 80s. Pt was also nauseated.  Patient had a rhythm strip done up in the unit which did not show definitive P waves and QRS was slightly widened.  Pt was sent to ER. Pt had a similar episode in September and she had an ECHO and a ZIO. Per pt the ECHO was OK. She did not find out the ZIO results because she was on a traveling nurse contract  "and returned here. Pt reported she works out regularly. EKG showed NSR 63, some subtle J point elevation in leads II and III. Flipped T waves in 1 and AVL, Troponin negative x 2.  Patient's initial EKG showed potential changes however repeat ECG was exactly the same as it was in 2020.  EKG that was overcorrection did have some baseline artifact it was not definitive.  Patient also became incredibly diaphoretic set her nursing gown had become soaked.  Patient does use some supplements for her bodybuilding does include L-arginine.  Patient has been utilizing this for years.  Patient's pain has now localized into her mid abdomen area.  Patient is never had chest discomfort   My nurse was able to find the Zio patch results that was placed on patient when she was in [place].  It showed some brief runs of SVTs as well as a right bundle.  Dr. Jameson it was nice enough to review both the current rhythm strips as well as the Zio patch.  He is in agreement with my assessment at this point no further work-up from a cardiovascular standpoint is necessary.  I spoke with patient told her to stop all of her supplements follow-up with me in about 2 to 4 weeks.  At that time we will readjust her supplementations and see how she is clinically doing.  We will sign off from a cardiovascular standpoint please call if there is questions or issues.  Arnel Cee MD  359pm    US GB 3-4-22  Liver echotexture suggests possible fatty infiltration.  2. The gallbladder is normal, no biliary duct dilatation.     Note hx runs of SVT    3 yrs lump neck;  No pain    Atopic dermatitis all over but face  Can get hives  Rash on and off for yrs---      Mary Alves female 46 y.o., /78   Pulse 83   Temp 97.8 °F (36.6 °C) (Skin)   Resp 16   Ht 175.3 cm (69\")   Wt 73.5 kg (162 lb)   LMP 04/26/2022   SpO2 97%   BMI 23.92 kg/m²   who presents today for new evaluation and treatment of Anxiety.  She reports anxiety, " excessive worry, unable to relax and irritable. Onset of symptoms was approximately several months ago. She denies current suicidal and homicidal ideation. Risk factors are family history of anxiety and or depression and lifestyle of multiple roles.  Previous treatment includes see below. She complains of the following medication side effects:see below. The patient declines to go to counseling..      Worse with Zoloft Prozac  The following portions of the patient's history were reviewed and updated as appropriate: allergies, current medications, past family history, past medical history, past social history, past surgical history and problem list.    Review of Systems   Constitutional: Negative for activity change, appetite change and unexpected weight change.   HENT: Negative for nosebleeds and trouble swallowing.    Eyes: Negative for pain and visual disturbance.   Respiratory: Negative for chest tightness, shortness of breath and wheezing.    Cardiovascular: Negative for chest pain and palpitations.   Gastrointestinal: Negative for abdominal pain and blood in stool.   Endocrine: Negative.    Genitourinary: Negative for difficulty urinating and hematuria.   Musculoskeletal: Negative for joint swelling.   Skin: Positive for rash. Negative for color change.   Allergic/Immunologic: Negative.    Neurological: Negative for syncope and speech difficulty.   Hematological: Negative for adenopathy.   Psychiatric/Behavioral: Negative for agitation and confusion.   All other systems reviewed and are negative.      Objective   Physical Exam  Vitals and nursing note reviewed.   Constitutional:       General: She is not in acute distress.     Appearance: Normal appearance. She is well-developed. She is not ill-appearing or toxic-appearing.   HENT:      Head: Normocephalic.      Right Ear: External ear normal.      Left Ear: External ear normal.      Nose: Nose normal.      Mouth/Throat:      Pharynx: Oropharynx is clear.    Eyes:      General: No scleral icterus.     Conjunctiva/sclera: Conjunctivae normal.      Pupils: Pupils are equal, round, and reactive to light.   Neck:      Thyroid: No thyromegaly.      Comments: Questionable lymph node right side anterior to thyroid lobe under skin not attached it freely mobile about a centimeter questionable lymph node versus cyst--getting  soft tissue ultrasound head and neck  Cardiovascular:      Rate and Rhythm: Normal rate and regular rhythm.      Heart sounds: Normal heart sounds. No murmur heard.  Pulmonary:      Effort: Pulmonary effort is normal. No respiratory distress.      Breath sounds: Normal breath sounds.   Musculoskeletal:         General: No tenderness or deformity. Normal range of motion.      Cervical back: Normal range of motion and neck supple.   Lymphadenopathy:      Cervical: Cervical adenopathy present.   Skin:     General: Skin is warm and dry.      Findings: Rash present.      Comments: Maculopapular rash primarily on arms and waistline back AC fossa more indicative of eczema type atopic rash   Neurological:      General: No focal deficit present.      Mental Status: She is alert and oriented to person, place, and time. Mental status is at baseline.   Psychiatric:         Mood and Affect: Mood normal.         Behavior: Behavior normal.         Thought Content: Thought content normal.         Judgment: Judgment normal.         Assessment & Plan   Diagnoses and all orders for this visit:    1. Hypothyroidism, acquired (Primary)    2. Acute gastritis without hemorrhage, unspecified gastritis type    3. Vitamin D deficiency    4. Generalized anxiety disorder      Does need f/u colonoscopy Sept  Failed Prozac and Zoloft--made her worse  Temovate E PRN rash  Try SRNI--watch palpitations  Restart thyroid then labs 1 mo---want KAI for rash  Update 1 mo  Consider refer derm  Labs 1 mo

## 2022-06-24 ENCOUNTER — HOSPITAL ENCOUNTER (OUTPATIENT)
Dept: ULTRASOUND IMAGING | Facility: HOSPITAL | Age: 47
Discharge: HOME | End: 2022-06-24
Admitting: PHYSICIAN ASSISTANT

## 2022-06-24 DIAGNOSIS — R22.1 NECK MASS: ICD-10-CM

## 2022-06-24 PROCEDURE — 76536 US EXAM OF HEAD AND NECK: CPT

## 2022-06-27 DIAGNOSIS — R22.1 NECK MASS: Primary | ICD-10-CM

## 2022-07-14 ENCOUNTER — OFFICE VISIT (OUTPATIENT)
Dept: SURGERY | Facility: CLINIC | Age: 47
End: 2022-07-14

## 2022-07-14 VITALS — WEIGHT: 166.8 LBS | BODY MASS INDEX: 24.71 KG/M2 | HEIGHT: 69 IN

## 2022-07-14 DIAGNOSIS — R22.1 SUBCUTANEOUS MASS OF NECK: Primary | ICD-10-CM

## 2022-07-14 PROCEDURE — 99203 OFFICE O/P NEW LOW 30 MIN: CPT | Performed by: SURGERY

## 2022-07-14 RX ORDER — IBUPROFEN 800 MG/1
800 TABLET ORAL EVERY 8 HOURS PRN
COMMUNITY
Start: 2022-06-22

## 2022-07-14 RX ORDER — DOXYCYCLINE 100 MG/1
TABLET ORAL
COMMUNITY
Start: 2022-07-08 | End: 2022-07-21

## 2022-07-14 RX ORDER — CEFAZOLIN SODIUM 2 G/100ML
2 INJECTION, SOLUTION INTRAVENOUS ONCE
Status: CANCELLED | OUTPATIENT
Start: 2022-07-26 | End: 2022-07-14

## 2022-07-14 RX ORDER — PROGESTERONE 100 MG/1
100 CAPSULE ORAL DAILY
COMMUNITY
Start: 2022-06-21 | End: 2022-07-21

## 2022-07-15 NOTE — PROGRESS NOTES
General Surgery  Initial Office Visit    CC: Neck mass    HPI: The patient is a pleasant 47 y.o. year-old lady who presents today for evaluation of a subcutaneous mass along the anterior neck which has been present in a constant duration for many years.  It was always tiny in size and recently enlarged and become more noticeable superficially beneath the skin.  Multiple people have commented upon it so she had it checked out by her primary care provider, Rosita Scott.  This involved an ultrasound of the neck which demonstrated a 1.3 cm subcutaneous lipoma not associated with the thyroid gland.  She was then referred to see me for possible lipoma excision.    Past Medical History:   Hypothyroidism  History of colon polyps    Past Surgical History:   EGD (2022, Dr. Varela)  Breast biopsy  Bilateral breast augmentation with subsequent revision   section  Right elbow surgery for cubital tunnel syndrome  Right wrist surgery for carpal tunnel syndrome  Oophorectomy  Right knee surgery  Colonoscopy    Medications:   Clobetasol emollient cream as needed  Desvenlafaxine 50 mg daily  Doxycycline 100 mg twice daily  Estradiol 1.25 g gel to the skin daily  Pepcid 20 mg twice daily  Ibuprofen 800 mg every 8 hours as needed for pain  Levothyroxine 25 mcg daily  Multivitamin once daily  Omega-3 fatty acids 1 capsule daily  Progesterone 100 mg daily    Allergies: No known drug allergies    Family History: Mother with history of colon cancer and colon polyps, father with history of brain cancer and coronary artery disease/myocardial infarction    Social History: , 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, non-smoker, social alcohol use    ROS:   Constitutional: Negative for fevers or chills  HENT: Positive for lump/mass in the neck; negative for hearing loss or runny nose  Eyes: Negative for vision changes or scleral icterus  Respiratory: Negative for cough or shortness of  breath  Cardiovascular: Negative for chest pain or heart palpitations  Gastrointestinal: Negative for abdominal distension, nausea, vomiting, constipation, melena, or hematochezia  Genitourinary: Negative for hematuria or dysuria  Musculoskeletal: Negative for joint swelling or gait instability  Neurologic: Negative for tremors or seizures  Psychiatric: Negative for suicidal ideations or agitation  All other systems reviewed and negative    Physical Exam:  Height: 175 cm  Weight: 75 kg  BMI: 24.6  General: No acute distress, well-nourished & well-developed  HEAD: normocephalic, atraumatic  EYES: normal conjunctiva, sclera anicteric  EARS: grossly normal hearing  NECK: Superficial 1 cm mass within the anterior neck just to the right of midline which is mobile within the subcutaneous fat just beneath the skin and consistent with a benign lipoma  CARDIOVASCULAR: regular rate and rhythm  RESPIRATORY: clear to auscultation bilaterally  GASTROINTESTINAL: soft, nontender, non-distended  MUSCULOSKELETAL: normal gait and station. No gross extremity abnormalities  PSYCHIATRIC: oriented x3, normal mood and affect    IMAGING:  ULTRASOUND NECK:  FINDINGS:   Targeted sonographic evaluation of the neck was performed in the area of concern indicated by the patient. Images labeled right neck, reported as superior to the thyroid, demonstrate a 1.3 x 0.4 x 1.3 cm oval circumscribed parallel oriented hypoechoic mass, which is isoechoic to adjacent fat lobules. No corresponding internal vascularity is identified. Findings are suggestive of a benign lipoma. Further management should be based on clinical assessment.  Given the patient reported increase in size, surgical excision could be considered, as clinically directed.    ASSESSMENT & PLAN  Ms. Alves is a 47-year-old lady with what appears to be a superficial lipoma of the anterior neck.  I reviewed the ultrasound that was done last month and showed her the images today.  The  palpable mass is situated just beneath the skin and does not appear to be associated with the thyroid gland beneath.  This would suggest a benign etiology such as a lipoma.  I would recommend proceeding with surgical resection of this mass, but due to the surrounding structures such as the anterior jugular vein and thyroid I would suggest we do this in the operating room under propofol sedation.  She understands the risks of the procedure include bleeding, possible wound infection, and possible lipoma recurrence elsewhere on the body.  Despite these risks she has consented to proceed.    Jolanta Villalpando MD  General, Robotic, and Endoscopic Surgery  [facility]    [address], [address]  [place] [address]  P: [phone]  F: [phone]

## 2022-07-21 ENCOUNTER — PRE-ADMISSION TESTING (OUTPATIENT)
Dept: PREADMISSION TESTING | Facility: HOSPITAL | Age: 47
End: 2022-07-21

## 2022-07-21 VITALS
HEIGHT: 69 IN | OXYGEN SATURATION: 100 % | RESPIRATION RATE: 16 BRPM | DIASTOLIC BLOOD PRESSURE: 80 MMHG | TEMPERATURE: 98.2 F | HEART RATE: 73 BPM | BODY MASS INDEX: 25.21 KG/M2 | SYSTOLIC BLOOD PRESSURE: 133 MMHG | WEIGHT: 170.2 LBS

## 2022-07-21 DIAGNOSIS — R22.1 SUBCUTANEOUS MASS OF NECK: ICD-10-CM

## 2022-07-21 LAB
ANION GAP SERPL CALCULATED.3IONS-SCNC: 15 MMOL/L (ref 5–15)
BUN SERPL-MCNC: 21 MG/DL (ref 6–20)
BUN/CREAT SERPL: 31.8 (ref 7–25)
CALCIUM SPEC-SCNC: 8.8 MG/DL (ref 8.6–10.5)
CHLORIDE SERPL-SCNC: 101 MMOL/L (ref 98–107)
CO2 SERPL-SCNC: 24 MMOL/L (ref 22–29)
CREAT SERPL-MCNC: 0.66 MG/DL (ref 0.57–1)
DEPRECATED RDW RBC AUTO: 41.6 FL (ref 37–54)
EGFRCR SERPLBLD CKD-EPI 2021: 108.4 ML/MIN/1.73
ERYTHROCYTE [DISTWIDTH] IN BLOOD BY AUTOMATED COUNT: 12.7 % (ref 12.3–15.4)
GLUCOSE SERPL-MCNC: 90 MG/DL (ref 65–99)
HCG SERPL QL: NEGATIVE
HCT VFR BLD AUTO: 35.8 % (ref 34–46.6)
HGB BLD-MCNC: 12 G/DL (ref 12–15.9)
MCH RBC QN AUTO: 30.6 PG (ref 26.6–33)
MCHC RBC AUTO-ENTMCNC: 33.5 G/DL (ref 31.5–35.7)
MCV RBC AUTO: 91.3 FL (ref 79–97)
PLATELET # BLD AUTO: 208 10*3/MM3 (ref 140–450)
PMV BLD AUTO: 8.8 FL (ref 6–12)
POTASSIUM SERPL-SCNC: 4.3 MMOL/L (ref 3.5–5.2)
RBC # BLD AUTO: 3.92 10*6/MM3 (ref 3.77–5.28)
SODIUM SERPL-SCNC: 140 MMOL/L (ref 136–145)
WBC NRBC COR # BLD: 4.95 10*3/MM3 (ref 3.4–10.8)

## 2022-07-21 PROCEDURE — 36415 COLL VENOUS BLD VENIPUNCTURE: CPT

## 2022-07-21 PROCEDURE — 80048 BASIC METABOLIC PNL TOTAL CA: CPT

## 2022-07-21 PROCEDURE — 84703 CHORIONIC GONADOTROPIN ASSAY: CPT

## 2022-07-21 PROCEDURE — 85027 COMPLETE CBC AUTOMATED: CPT

## 2022-07-21 RX ORDER — CHLORHEXIDINE GLUCONATE 500 MG/1
CLOTH TOPICAL
COMMUNITY
End: 2022-08-09 | Stop reason: HOSPADM

## 2022-07-21 NOTE — DISCHARGE INSTRUCTIONS
Take the following medications the morning of surgery:   PRISTIQ, LEVOTHYROXINE, AND ESTRIOL AND PROGESTERONE    If you are on prescription narcotic pain medication to control your pain you may also take that medication the morning of surgery.    General Instructions:  Do not eat solid food after midnight the night before surgery.  You may drink clear liquids day of surgery but must stop at least one hour before your hospital arrival time.  It is beneficial for you to have a clear drink that contains carbohydrates the day of surgery.  We suggest a 12 to 20 ounce bottle of Gatorade or Powerade for non-diabetic patients or a 12 to 20 ounce bottle of G2 or Powerade Zero for diabetic patients. (Pediatric patients, are not advised to drink a 12 to 20 ounce carbohydrate drink)    Clear liquids are liquids you can see through.  Nothing red in color.     Plain water                               Sports drinks  Sodas                                   Gelatin (Jell-O)  Fruit juices without pulp such as white grape juice and apple juice  Popsicles that contain no fruit or yogurt  Tea or coffee (no cream or milk added)  Gatorade / Powerade  G2 / Powerade Zero      Patients who avoid smoking, chewing tobacco and alcohol for 4 weeks prior to surgery have a reduced risk of post-operative complications.  Quit smoking as many days before surgery as you can.  Do not smoke, use chewing tobacco or drink alcohol the day of surgery.   If applicable bring your C-PAP/ BI-PAP machine.  Bring any papers given to you in the doctor’s office.  Wear clean comfortable clothes.  Do not wear contact lenses, false eyelashes or make-up.  Bring a case for your glasses.   Bring crutches or walker if applicable.  Remove all piercings.  Leave jewelry and any other valuables at home.  Hair extensions with metal clips must be removed prior to surgery.  The Pre-Admission Testing nurse will instruct you to bring medications if unable to obtain an accurate  list in Pre-Admission Testing.            Preventing a Surgical Site Infection:  For 2 to 3 days before surgery, avoid shaving with a razor because the razor can irritate skin and make it easier to develop an infection.    Any areas of open skin can increase the risk of a post-operative wound infection by allowing bacteria to enter and travel throughout the body.  Notify your surgeon if you have any skin wounds / rashes even if it is not near the expected surgical site.  The area will need assessed to determine if surgery should be delayed until it is healed.  The night prior to surgery shower using a fresh bar of anti-bacterial soap (such as Dial) and clean washcloth.  Sleep in a clean bed with clean clothing.  Do not allow pets to sleep with you.  Shower on the morning of surgery using a fresh bar of anti-bacterial soap (such as Dial) and clean washcloth.  Dry with a clean towel and dress in clean clothing.  Ask your surgeon if you will be receiving antibiotics prior to surgery.  Make sure you, your family, and all healthcare providers clean their hands with soap and water or an alcohol based hand  before caring for you or your wound.      CHLORHEXIDINE CLOTH INSTRUCTIONS  The morning of surgery follow these instructions using the Chlorhexidine cloths you've been given.  These steps reduce bacteria on the body.  Do not use the cloths near your eyes, ears mouth, genitalia or on open wounds.  Throw the cloths away after use but do not try to flush them down a toilet.      Open and remove one cloth at a time from the package.    Leave the cloth unfolded and begin the bathing.  Massage the skin with the cloths using gentle pressure to remove bacteria.  Do not scrub harshly.   Follow the steps below with one 2% CHG cloth per area (6 total cloths).  One cloth for neck, shoulders and chest.  One cloth for both arms, hands, fingers and underarms (do underarms last).  One cloth for the abdomen followed by  groin.  One cloth for right leg and foot including between the toes.  One cloth for left leg and foot including between the toes.  The last cloth is to be used for the back of the neck, back and buttocks.    Allow the CHG to air dry 3 minutes on the skin which will give it time to work and decrease the chance of irritation.  The skin may feel sticky until it is dry.  Do not rinse with water or any other liquid or you will lose the beneficial effects of the CHG.  If mild skin irritation occurs, do rinse the skin to remove the CHG.  Report this to the nurse at time of admission.  Do not apply lotions, creams, ointments, deodorants or perfumes after using the clothes. Dress in clean clothes before coming to the hospital.     Day of surgery:  Your arrival time is approximately two hours before your scheduled surgery time.  Upon arrival, a Pre-op nurse and Anesthesiologist will review your health history, obtain vital signs, and answer questions you may have.  The only belongings needed at this time will be a list of your home medications and if applicable your C-PAP/BI-PAP machine.  A Pre-op nurse will start an IV and you may receive medication in preparation for surgery, including something to help you relax.     Please be aware that surgery does come with discomfort.  We want to make every effort to control your discomfort so please discuss any uncontrolled symptoms with your nurse.   Your doctor will most likely have prescribed pain medications.      If you are going home after surgery you will receive individualized written care instructions before being discharged.  A responsible adult must drive you to and from the hospital on the day of your surgery and stay with you for 24 hours.  Discharge prescriptions can be filled by the hospital pharmacy during regular pharmacy hours.  If you are having surgery late in the day/evening your prescription may be e-prescribed to your pharmacy.  Please verify your pharmacy hours  or chose a 24 hour pharmacy to avoid not having access to your prescription because your pharmacy has closed for the day.    If you are staying overnight following surgery, you will be transported to your hospital room following the recovery period.  [facility] has all private rooms.    If you have any questions please call Pre-Admission Testing at [phone].  Deductibles and co-payments are collected on the day of service. Please be prepared to pay the required co-pay, deductible or deposit on the day of service as defined by your plan.    Patient Education for Self-Quarantine Process    Following your COVID testing, we strongly recommend that you wear a mask when you are with other people and practice social distancing.   Limit your activities to only required outings.  Wash your hands with soap and water frequently for at least 20 seconds.   Avoid touching your eyes, nose and mouth with unwashed hands.  Do not share anything - utensils, drinking glasses, food from the same bowl.   Sanitize household surfaces daily. Include all high touch areas (door handles, light switches, phones, countertops, etc.)    Call your surgeon immediately if you experience any of the following symptoms:  Sore Throat  Shortness of Breath or difficulty breathing  Cough  Chills  Body soreness or muscle pain  Headache  Fever  New loss of taste or smell  Do not arrive for your surgery ill.  Your procedure will need to be rescheduled to another time.  You will need to call your physician before the day of surgery to avoid any unnecessary exposure to hospital staff as well as other patients.

## 2022-07-23 ENCOUNTER — LAB (OUTPATIENT)
Dept: LAB | Facility: HOSPITAL | Age: 47
End: 2022-07-23

## 2022-07-23 DIAGNOSIS — R22.1 SUBCUTANEOUS MASS OF NECK: ICD-10-CM

## 2022-07-23 PROCEDURE — U0004 COV-19 TEST NON-CDC HGH THRU: HCPCS

## 2022-07-23 PROCEDURE — C9803 HOPD COVID-19 SPEC COLLECT: HCPCS

## 2022-07-24 LAB — SARS-COV-2 ORF1AB RESP QL NAA+PROBE: DETECTED

## 2022-07-25 ENCOUNTER — TELEPHONE (OUTPATIENT)
Dept: SURGERY | Facility: CLINIC | Age: 47
End: 2022-07-25

## 2022-07-25 NOTE — TELEPHONE ENCOUNTER
I called Ara to let her know that her COVID test done for preadmission testing over the weekend returned positive.  She is completely asymptomatic at this time and is actually done a home test this morning that returned negative.  I explained that the PCR test we do is more sensitive than the home tests, and she should presume that she is indeed positive and infectious.  She should quarantine based on CDC guidelines and we have rescheduled her surgery for early August.

## 2022-07-26 ENCOUNTER — TRANSCRIBE ORDERS (OUTPATIENT)
Dept: ADMINISTRATIVE | Facility: HOSPITAL | Age: 47
End: 2022-07-26

## 2022-07-26 DIAGNOSIS — Z01.818 OTHER SPECIFIED PRE-OPERATIVE EXAMINATION: Primary | ICD-10-CM

## 2022-08-06 ENCOUNTER — LAB (OUTPATIENT)
Dept: LAB | Facility: HOSPITAL | Age: 47
End: 2022-08-06

## 2022-08-06 DIAGNOSIS — Z01.818 OTHER SPECIFIED PRE-OPERATIVE EXAMINATION: ICD-10-CM

## 2022-08-06 LAB — SARS-COV-2 ORF1AB RESP QL NAA+PROBE: DETECTED

## 2022-08-06 PROCEDURE — U0004 COV-19 TEST NON-CDC HGH THRU: HCPCS

## 2022-08-06 PROCEDURE — C9803 HOPD COVID-19 SPEC COLLECT: HCPCS

## 2022-08-09 ENCOUNTER — ANESTHESIA EVENT (OUTPATIENT)
Dept: PERIOP | Facility: HOSPITAL | Age: 47
End: 2022-08-09

## 2022-08-09 ENCOUNTER — HOSPITAL ENCOUNTER (OUTPATIENT)
Facility: HOSPITAL | Age: 47
Setting detail: HOSPITAL OUTPATIENT SURGERY
Discharge: HOME | End: 2022-08-09
Attending: SURGERY | Admitting: SURGERY

## 2022-08-09 ENCOUNTER — ANESTHESIA (OUTPATIENT)
Dept: PERIOP | Facility: HOSPITAL | Age: 47
End: 2022-08-09

## 2022-08-09 VITALS
SYSTOLIC BLOOD PRESSURE: 114 MMHG | RESPIRATION RATE: 16 BRPM | DIASTOLIC BLOOD PRESSURE: 71 MMHG | BODY MASS INDEX: 24.72 KG/M2 | TEMPERATURE: 98.3 F | HEART RATE: 55 BPM | OXYGEN SATURATION: 98 % | HEIGHT: 69 IN | WEIGHT: 166.89 LBS

## 2022-08-09 DIAGNOSIS — R22.1 SUBCUTANEOUS MASS OF NECK: ICD-10-CM

## 2022-08-09 PROCEDURE — 81025 URINE PREGNANCY TEST: CPT | Performed by: SURGERY

## 2022-08-09 PROCEDURE — 25010000002 PROPOFOL 10 MG/ML EMULSION

## 2022-08-09 PROCEDURE — 25010000002 MIDAZOLAM PER 1 MG: Performed by: ANESTHESIOLOGY

## 2022-08-09 PROCEDURE — 25010000002 CEFAZOLIN IN DEXTROSE 2-4 GM/100ML-% SOLUTION: Performed by: SURGERY

## 2022-08-09 PROCEDURE — 88304 TISSUE EXAM BY PATHOLOGIST: CPT | Performed by: SURGERY

## 2022-08-09 PROCEDURE — 21555 EXC NECK LES SC < 3 CM: CPT | Performed by: SURGERY

## 2022-08-09 PROCEDURE — 25010000002 FENTANYL CITRATE (PF) 50 MCG/ML SOLUTION

## 2022-08-09 RX ORDER — CEFAZOLIN SODIUM 2 G/100ML
2 INJECTION, SOLUTION INTRAVENOUS ONCE
Status: COMPLETED | OUTPATIENT
Start: 2022-08-09 | End: 2022-08-09

## 2022-08-09 RX ORDER — SODIUM CHLORIDE 0.9 % (FLUSH) 0.9 %
3 SYRINGE (ML) INJECTION EVERY 12 HOURS SCHEDULED
Status: DISCONTINUED | OUTPATIENT
Start: 2022-08-09 | End: 2022-08-09 | Stop reason: HOSPADM

## 2022-08-09 RX ORDER — SODIUM CHLORIDE, SODIUM LACTATE, POTASSIUM CHLORIDE, CALCIUM CHLORIDE 600; 310; 30; 20 MG/100ML; MG/100ML; MG/100ML; MG/100ML
9 INJECTION, SOLUTION INTRAVENOUS CONTINUOUS
Status: DISCONTINUED | OUTPATIENT
Start: 2022-08-09 | End: 2022-08-09 | Stop reason: HOSPADM

## 2022-08-09 RX ORDER — LABETALOL HYDROCHLORIDE 5 MG/ML
5 INJECTION, SOLUTION INTRAVENOUS
Status: DISCONTINUED | OUTPATIENT
Start: 2022-08-09 | End: 2022-08-09 | Stop reason: HOSPADM

## 2022-08-09 RX ORDER — LIDOCAINE HYDROCHLORIDE 10 MG/ML
0.5 INJECTION, SOLUTION EPIDURAL; INFILTRATION; INTRACAUDAL; PERINEURAL ONCE AS NEEDED
Status: DISCONTINUED | OUTPATIENT
Start: 2022-08-09 | End: 2022-08-09 | Stop reason: HOSPADM

## 2022-08-09 RX ORDER — DIPHENHYDRAMINE HYDROCHLORIDE 50 MG/ML
12.5 INJECTION INTRAMUSCULAR; INTRAVENOUS
Status: DISCONTINUED | OUTPATIENT
Start: 2022-08-09 | End: 2022-08-09 | Stop reason: HOSPADM

## 2022-08-09 RX ORDER — IBUPROFEN 600 MG/1
600 TABLET ORAL ONCE AS NEEDED
Status: DISCONTINUED | OUTPATIENT
Start: 2022-08-09 | End: 2022-08-09 | Stop reason: HOSPADM

## 2022-08-09 RX ORDER — OXYCODONE AND ACETAMINOPHEN 7.5; 325 MG/1; MG/1
1 TABLET ORAL EVERY 4 HOURS PRN
Status: DISCONTINUED | OUTPATIENT
Start: 2022-08-09 | End: 2022-08-09 | Stop reason: HOSPADM

## 2022-08-09 RX ORDER — NALOXONE HCL 0.4 MG/ML
0.2 VIAL (ML) INJECTION AS NEEDED
Status: DISCONTINUED | OUTPATIENT
Start: 2022-08-09 | End: 2022-08-09 | Stop reason: HOSPADM

## 2022-08-09 RX ORDER — MIDAZOLAM HYDROCHLORIDE 1 MG/ML
1 INJECTION INTRAMUSCULAR; INTRAVENOUS
Status: COMPLETED | OUTPATIENT
Start: 2022-08-09 | End: 2022-08-09

## 2022-08-09 RX ORDER — FAMOTIDINE 10 MG/ML
20 INJECTION, SOLUTION INTRAVENOUS ONCE
Status: COMPLETED | OUTPATIENT
Start: 2022-08-09 | End: 2022-08-09

## 2022-08-09 RX ORDER — FENTANYL CITRATE 50 UG/ML
INJECTION, SOLUTION INTRAMUSCULAR; INTRAVENOUS AS NEEDED
Status: DISCONTINUED | OUTPATIENT
Start: 2022-08-09 | End: 2022-08-09 | Stop reason: SURG

## 2022-08-09 RX ORDER — LIDOCAINE HYDROCHLORIDE 20 MG/ML
INJECTION, SOLUTION INFILTRATION; PERINEURAL AS NEEDED
Status: DISCONTINUED | OUTPATIENT
Start: 2022-08-09 | End: 2022-08-09 | Stop reason: SURG

## 2022-08-09 RX ORDER — MAGNESIUM HYDROXIDE 1200 MG/15ML
LIQUID ORAL AS NEEDED
Status: DISCONTINUED | OUTPATIENT
Start: 2022-08-09 | End: 2022-08-09 | Stop reason: HOSPADM

## 2022-08-09 RX ORDER — SODIUM CHLORIDE 0.9 % (FLUSH) 0.9 %
3-10 SYRINGE (ML) INJECTION AS NEEDED
Status: DISCONTINUED | OUTPATIENT
Start: 2022-08-09 | End: 2022-08-09 | Stop reason: HOSPADM

## 2022-08-09 RX ORDER — FLUMAZENIL 0.1 MG/ML
0.2 INJECTION INTRAVENOUS AS NEEDED
Status: DISCONTINUED | OUTPATIENT
Start: 2022-08-09 | End: 2022-08-09 | Stop reason: HOSPADM

## 2022-08-09 RX ORDER — DIPHENHYDRAMINE HCL 25 MG
25 CAPSULE ORAL
Status: DISCONTINUED | OUTPATIENT
Start: 2022-08-09 | End: 2022-08-09 | Stop reason: HOSPADM

## 2022-08-09 RX ORDER — PROMETHAZINE HYDROCHLORIDE 25 MG/1
25 SUPPOSITORY RECTAL ONCE AS NEEDED
Status: DISCONTINUED | OUTPATIENT
Start: 2022-08-09 | End: 2022-08-09 | Stop reason: HOSPADM

## 2022-08-09 RX ORDER — FENTANYL CITRATE 50 UG/ML
50 INJECTION, SOLUTION INTRAMUSCULAR; INTRAVENOUS
Status: DISCONTINUED | OUTPATIENT
Start: 2022-08-09 | End: 2022-08-09 | Stop reason: HOSPADM

## 2022-08-09 RX ORDER — PROMETHAZINE HYDROCHLORIDE 25 MG/1
25 TABLET ORAL ONCE AS NEEDED
Status: DISCONTINUED | OUTPATIENT
Start: 2022-08-09 | End: 2022-08-09 | Stop reason: HOSPADM

## 2022-08-09 RX ORDER — HYDROMORPHONE HYDROCHLORIDE 1 MG/ML
0.5 INJECTION, SOLUTION INTRAMUSCULAR; INTRAVENOUS; SUBCUTANEOUS
Status: DISCONTINUED | OUTPATIENT
Start: 2022-08-09 | End: 2022-08-09 | Stop reason: HOSPADM

## 2022-08-09 RX ORDER — HYDROCODONE BITARTRATE AND ACETAMINOPHEN 7.5; 325 MG/1; MG/1
1 TABLET ORAL ONCE AS NEEDED
Status: DISCONTINUED | OUTPATIENT
Start: 2022-08-09 | End: 2022-08-09 | Stop reason: HOSPADM

## 2022-08-09 RX ORDER — BUPIVACAINE HYDROCHLORIDE AND EPINEPHRINE 5; 5 MG/ML; UG/ML
INJECTION, SOLUTION PERINEURAL AS NEEDED
Status: DISCONTINUED | OUTPATIENT
Start: 2022-08-09 | End: 2022-08-09 | Stop reason: HOSPADM

## 2022-08-09 RX ORDER — HYDRALAZINE HYDROCHLORIDE 20 MG/ML
5 INJECTION INTRAMUSCULAR; INTRAVENOUS
Status: DISCONTINUED | OUTPATIENT
Start: 2022-08-09 | End: 2022-08-09 | Stop reason: HOSPADM

## 2022-08-09 RX ORDER — PROPOFOL 10 MG/ML
VIAL (ML) INTRAVENOUS AS NEEDED
Status: DISCONTINUED | OUTPATIENT
Start: 2022-08-09 | End: 2022-08-09 | Stop reason: SURG

## 2022-08-09 RX ORDER — EPHEDRINE SULFATE 50 MG/ML
5 INJECTION, SOLUTION INTRAVENOUS ONCE AS NEEDED
Status: DISCONTINUED | OUTPATIENT
Start: 2022-08-09 | End: 2022-08-09 | Stop reason: HOSPADM

## 2022-08-09 RX ORDER — ONDANSETRON 2 MG/ML
4 INJECTION INTRAMUSCULAR; INTRAVENOUS ONCE AS NEEDED
Status: DISCONTINUED | OUTPATIENT
Start: 2022-08-09 | End: 2022-08-09 | Stop reason: HOSPADM

## 2022-08-09 RX ADMIN — MIDAZOLAM 1 MG: 1 INJECTION INTRAMUSCULAR; INTRAVENOUS at 11:57

## 2022-08-09 RX ADMIN — LIDOCAINE HYDROCHLORIDE 60 MG: 20 INJECTION, SOLUTION INFILTRATION; PERINEURAL at 12:18

## 2022-08-09 RX ADMIN — FAMOTIDINE 20 MG: 10 INJECTION INTRAVENOUS at 11:57

## 2022-08-09 RX ADMIN — SODIUM CHLORIDE, POTASSIUM CHLORIDE, SODIUM LACTATE AND CALCIUM CHLORIDE 9 ML/HR: 600; 310; 30; 20 INJECTION, SOLUTION INTRAVENOUS at 11:57

## 2022-08-09 RX ADMIN — MIDAZOLAM 1 MG: 1 INJECTION INTRAMUSCULAR; INTRAVENOUS at 12:07

## 2022-08-09 RX ADMIN — PROPOFOL 100 MG: 10 INJECTION, EMULSION INTRAVENOUS at 12:20

## 2022-08-09 RX ADMIN — FENTANYL CITRATE 25 MCG: 50 INJECTION INTRAMUSCULAR; INTRAVENOUS at 12:37

## 2022-08-09 RX ADMIN — PROPOFOL 150 MCG/KG/MIN: 10 INJECTION, EMULSION INTRAVENOUS at 12:20

## 2022-08-09 RX ADMIN — CEFAZOLIN SODIUM 2 G: 2 INJECTION, SOLUTION INTRAVENOUS at 12:14

## 2022-08-09 RX ADMIN — FENTANYL CITRATE 25 MCG: 50 INJECTION INTRAMUSCULAR; INTRAVENOUS at 12:34

## 2022-08-09 NOTE — ANESTHESIA PREPROCEDURE EVALUATION
Anesthesia Evaluation     Patient summary reviewed and Nursing notes reviewed   history of anesthetic complications: prolonged sedation  NPO Solid Status: > 8 hours  NPO Liquid Status: > 4 hours           Airway   Mallampati: II  Neck ROM: full  No difficulty expected  Dental - normal exam     Pulmonary     breath sounds clear to auscultation  (+) asthma,  Cardiovascular     Rhythm: regular        Neuro/Psych  GI/Hepatic/Renal/Endo    (+)   thyroid problem hypothyroidism    Musculoskeletal     Abdominal    Substance History      OB/GYN          Other                      Anesthesia Plan    ASA 2     MAC     intravenous induction     Anesthetic plan, risks, benefits, and alternatives have been provided, discussed and informed consent has been obtained with: patient.        CODE STATUS:

## 2022-08-09 NOTE — ANESTHESIA POSTPROCEDURE EVALUATION
"Patient: Mary Alves    Procedure Summary     Date: 08/09/22 Room / Location: [facility] OR [address] [place] [address] MAIN OR    Anesthesia Start: 1215 Anesthesia Stop: 1302    Procedure: Excision anterior neck lipoma (N/A ) Diagnosis:       Subcutaneous mass of neck      (Subcutaneous mass of neck [R22.1])    Surgeons: Jolanta Villalpando MD Provider: Julian Blair MD    Anesthesia Type: MAC ASA Status: 2          Anesthesia Type: MAC    Vitals  Vitals Value Taken Time   /72 08/09/22 1315   Temp     Pulse 56 08/09/22 1324   Resp 16 08/09/22 1315   SpO2 98 % 08/09/22 1324   Vitals shown include unvalidated device data.        Post Anesthesia Care and Evaluation    Patient location during evaluation: bedside  Patient participation: complete - patient participated  Level of consciousness: awake and alert  Pain score: 0  Pain management: adequate    Airway patency: patent  Anesthetic complications: No anesthetic complications  PONV Status: controlled  Cardiovascular status: acceptable  Respiratory status: acceptable  Hydration status: acceptable    Comments: /72   Pulse 67   Temp 36.8 °C (98.3 °F) (Oral)   Resp 16   Ht 175.3 cm (69\")   Wt 75.7 kg (166 lb 14.2 oz)   SpO2 100%   BMI 24.65 kg/m²         "

## 2022-08-09 NOTE — INTERVAL H&P NOTE
H&P updated. The patient was examined and the following changes are noted:  She presents today for elective neck lipoma excision. She tested positive for COVID a couple of weeks ago so her surgery was postponed. She is still testing positive for COVID but remains asymptomatic, so this is now considered a history of COVID as opposed to an active infection.

## 2022-08-09 NOTE — OP NOTE
Operative Note :  Jolanta Villalpando MD      Mary Levineell  1975    Procedure Date: 08/09/22    Pre-op Diagnosis:  Subcutaneous mass of neck [R22.1]    Post-Operative Diagnosis:  Lipoma of neck    Procedure:   Excision 1.2 cm subcutaneous lipoma anterior neck    Surgeon: Jolanta Villalpando MD    Assistant: None    Anesthesia:  MAC (monitored anesthetic care)    Estimated Blood Loss: 5 mL    Specimens: Neck lipoma    Complications: None    Indications:  The patient is a 47-year lady who came to see me with an enlarging chronic mass of the anterior neck visible beneath the skin consistent with a small lipoma.  It is situated directly in the anterior midline neck adjacent to the anterior jugular veins and I recommended proceeding with surgical excision of this in the operating room today under local/MAC anesthesia in case we run into any bleeding issues.  She understands the risks of the procedure include bleeding, wound infection, and lipoma development elsewhere.  Despite these risks, she has consented to proceed.    Findings: 1.2 cm x 1.0 cm x 0.8 cm lipoma removed from anterior neck    Description of procedure:  The patient was brought the operating room and placed on the OR table in supine position with a shoulder roll behind her scapulas.  Her anterior neck was prepped and draped after initiation of propofol sedation.  A surgical timeout was completed.  The previously marked neck lipoma was anesthetized at the skin using 10 cc of 0.5% Marcaine with epinephrine.  A longitudinal incision was made through the skin and the subcutaneous fat divided bluntly using a mosquito hemostat.  The firm well encapsulated lipoma was easily eviscerated from its surrounding soft tissue attachments using Metzenbaum scissors and electrocautery to obtain hemostasis along the way.  The lipoma measured 1.2 cm in length by 1.0 cm in width and 0.8 cm in depth.  It was passed off to pathology in formalin.  The incision was then  closed using 2 separate 4-0 Vicryl subcuticular sutures and topical Exofin glue.  She transferred to the recovery room in stable condition with all counts correct per nursing.    Jolanta Villalpando MD  General, Robotic, and Endoscopic Surgery  [facility]    [address], [address]  [place] [address]  P: [phone]  F: [phone]

## 2022-08-10 LAB
LAB AP CASE REPORT: NORMAL
PATH REPORT.FINAL DX SPEC: NORMAL
PATH REPORT.GROSS SPEC: NORMAL

## 2023-05-05 ENCOUNTER — PROCEDURE VISIT (OUTPATIENT)
Dept: OBSTETRICS AND GYNECOLOGY | Facility: CLINIC | Age: 48
End: 2023-05-05
Payer: COMMERCIAL

## 2023-05-05 DIAGNOSIS — Z12.31 VISIT FOR SCREENING MAMMOGRAM: Primary | ICD-10-CM

## 2023-05-22 ENCOUNTER — OFFICE VISIT (OUTPATIENT)
Dept: OBSTETRICS AND GYNECOLOGY | Facility: CLINIC | Age: 48
End: 2023-05-22
Payer: COMMERCIAL

## 2023-05-22 VITALS
DIASTOLIC BLOOD PRESSURE: 68 MMHG | HEIGHT: 69 IN | SYSTOLIC BLOOD PRESSURE: 118 MMHG | WEIGHT: 170 LBS | BODY MASS INDEX: 25.18 KG/M2

## 2023-05-22 DIAGNOSIS — Z20.2 POSSIBLE EXPOSURE TO STD: ICD-10-CM

## 2023-05-22 DIAGNOSIS — R10.32 LLQ PAIN: ICD-10-CM

## 2023-05-22 DIAGNOSIS — Z00.00 ANNUAL PHYSICAL EXAM: Primary | ICD-10-CM

## 2023-05-22 NOTE — PROGRESS NOTES
HPI   Mary Alves  is a 47 y.o. female who presents for several reasons.  First, she would like to have a routine gynecologic exam.  Overall, she is doing well.  Bowels and bladder are functioning normally.  Cycles are regular.  The patient uses condoms for contraception.  Her last mammogram was normal earlier this month.  Next, the patient uses condoms for contraception, but had a lost condom during her recent encounter.  She would like to be tested for STDs.  Next, the patient has a 2-day history of left lower quadrant pain and left flank pain.  She describes this as severe.  She denies fever or chills.  Denies vomiting.  Has noted some blood on the toilet paper when she urinates.    Chief Complaint   Patient presents with   • Gynecologic Exam     Patient is here for a annual and having severe lower left abd and back pain.       Past Medical History:   Diagnosis Date   • Abnormal ECG 2021    Right bundle branch block   • Asthma    • Breast mass     Benign tumor removed   • Colon polyp 2021    4 benign polyps removed   • Hypothyroidism    • Lipoma     ANTERIOR NECK   • Ovarian cyst    • PONV (postoperative nausea and vomiting)    • Slow to wake up after anesthesia        Past Surgical History:   Procedure Laterality Date   • AUGMENTATION MAMMAPLASTY     • BLADDER REPAIR      BLADDER SLING   • BREAST AUGMENTATION      Rupture implant  revision done   • BREAST BIOPSY     •  SECTION      Twins   • COLONOSCOPY     • ELBOW PROCEDURE Right 2022   • ENDOSCOPY N/A 2022    Procedure: ESOPHAGOGASTRODUODENOSCOPY WITH BIOPSY;  Surgeon: Dandre Varela MD;  Location: [facility] MAIN OR;  Service: Gastroenterology;  Laterality: N/A;  MILD GASTRITIS   • EXCISION MASS HEAD/NECK N/A 2022    Procedure: Excision anterior neck lipoma;  Surgeon: Jolanta Villalpando MD;  Location: [facility] MAIN OR;  Service: General;  Laterality: N/A;   • FRACTURE SURGERY      Right elbow   • KNEE SURGERY  Right    • OOPHORECTOMY         Social History     Socioeconomic History   • Marital status:    Tobacco Use   • Smoking status: Never   • Smokeless tobacco: Never   Vaping Use   • Vaping Use: Never used   Substance and Sexual Activity   • Alcohol use: Yes     Comment: occasionally   • Drug use: No   • Sexual activity: Yes     Partners: Male     Birth control/protection: Condom, None       The following portions of the patient's history were reviewed and updated as appropriate: allergies, current medications, past family history, past medical history, past social history, past surgical history and problem list.    Review of Systems  This is positive for left lower quadrant pain.  Negative for change in bowel habits.  Positive for for episodes of hematuria.  Negative for unexplained weight change.  Negative for fever or chills.  All other systems are reviewed and are negative.        Physical Exam  Vitals and nursing note reviewed.   Constitutional:       Appearance: She is well-developed.   HENT:      Head: Normocephalic and atraumatic.   Cardiovascular:      Rate and Rhythm: Normal rate and regular rhythm.   Pulmonary:      Effort: Pulmonary effort is normal.      Breath sounds: Normal breath sounds. No wheezing or rales.   Chest:      Comments: Implants are in place bilaterally.  There are no palpable breast lumps.  Nipple discharge and axillary adenopathy are absent.  Abdominal:      General: There is no distension.      Palpations: Abdomen is soft.      Tenderness: There is no abdominal tenderness.      Comments: There is CVA tenderness on the left.  Guarding and rebound are absent.   Genitourinary:     Labia:         Right: No lesion.         Left: No lesion.       Vagina: Normal. No vaginal discharge.      Cervix: No cervical motion tenderness.      Adnexa:         Right: No mass or tenderness.          Left: No mass or tenderness.        Comments: The uterus is normal in size.  It is mobile within the  pelvis.  It is not tender to palpation.  There are no palpable adnexal masses.  Skin:     General: Skin is warm and dry.   Neurological:      Mental Status: She is alert and oriented to person, place, and time.         Assessment    Diagnoses and all orders for this visit:    1. Annual physical exam (Primary)    2. LLQ pain  -     US Non-ob Transvaginal    3. Possible exposure to STD  -     HIV-1 / O / 2 Ag / Antibody 4th Generation  -     RPR        Plan  1. Annual examination performed  2. Counseled regarding the importance of regular screening mammograms.  Mammogram was negative last week.  3. Possible exposure to STDs.  Counseled.  The patient is using condoms, but had a loss [place] at a recent encounter.  Testing for gonorrhea, chlamydia, trichomonas, syphilis and HIV performed at the patient's request.  4. New onset left lower quadrant pain with possible hematuria.  There is also CVA tenderness on physical examination.  The patient is not current with a urologist.  I recommend an emergency room visit so a CT scan can be performed and a work-up can be done immediately.  The patient agrees with this.  Also, because occasionally an ovarian cyst can mimic the symptoms, I recommend an ultrasound even though I do not feel a mass or tenderness on the left adnexa.  The patient agrees with this recommendation.  Ultrasound has been ordered.    5. No follow-ups on file.    Social History     Tobacco Use   Smoking Status Never   Smokeless Tobacco Never   6.

## 2023-05-23 LAB
A VAGINAE DNA VAG QL NAA+PROBE: NORMAL SCORE
BVAB2 DNA VAG QL NAA+PROBE: NORMAL SCORE
C ALBICANS DNA VAG QL NAA+PROBE: NEGATIVE
C GLABRATA DNA VAG QL NAA+PROBE: NEGATIVE
HIV 1+2 AB+HIV1 P24 AG SERPL QL IA: NON REACTIVE
MEGA1 DNA VAG QL NAA+PROBE: NORMAL SCORE
RPR SER QL: NON REACTIVE

## 2023-05-24 LAB
BACTERIA UR CULT: NO GROWTH
BACTERIA UR CULT: NORMAL

## 2023-05-26 LAB
C TRACH RRNA CVX QL NAA+PROBE: NEGATIVE
CYTOLOGIST CVX/VAG CYTO: NORMAL
CYTOLOGY CVX/VAG DOC CYTO: NORMAL
CYTOLOGY CVX/VAG DOC THIN PREP: NORMAL
DX ICD CODE: NORMAL
HIV 1 & 2 AB SER-IMP: NORMAL
HPV I/H RISK 4 DNA CVX QL PROBE+SIG AMP: NEGATIVE
N GONORRHOEA RRNA CVX QL NAA+PROBE: NEGATIVE
OTHER STN SPEC: NORMAL
STAT OF ADQ CVX/VAG CYTO-IMP: NORMAL
T VAGINALIS RRNA SPEC QL NAA+PROBE: NEGATIVE

## 2023-09-12 RX ORDER — PROGESTERONE 100 MG/1
100 CAPSULE ORAL DAILY
Qty: 30 CAPSULE | Refills: 11 | OUTPATIENT
Start: 2023-09-12

## 2023-09-12 RX ORDER — ESTRADIOL 0.75 MG/1.25G
GEL, METERED TOPICAL
Qty: 50 G | Refills: 12 | OUTPATIENT
Start: 2023-09-12

## 2023-11-13 ENCOUNTER — TELEPHONE (OUTPATIENT)
Dept: OBSTETRICS AND GYNECOLOGY | Facility: CLINIC | Age: 48
End: 2023-11-13
Payer: COMMERCIAL

## 2023-11-13 NOTE — TELEPHONE ENCOUNTER
Pt requesting Progesterone and Estrogel refill please be sent to pharmacy.     Progesterone (PROMETRIUM) 100 MG capsule [[id number]]     Estradiol (Estrogel) 0.75 MG/1.25 GM (0.06%) topical gel [[id number]]     Pharmacy confirmed -   [id number] - [place], [place] - [address] [facility] [address] [facility] [phone] [facility] [phone] FX        Please advise,   Thank you

## 2023-11-14 RX ORDER — PROGESTERONE 100 MG/1
100 CAPSULE ORAL DAILY
Qty: 30 CAPSULE | Refills: 11 | Status: SHIPPED | OUTPATIENT
Start: 2023-11-14

## 2023-11-14 RX ORDER — ESTRADIOL 0.75 MG/1.25G
1.25 GEL, METERED TOPICAL DAILY
Qty: 50 G | Refills: 12 | Status: SHIPPED | OUTPATIENT
Start: 2023-11-14 | End: 2024-11-13

## 2024-01-15 RX ORDER — AZITHROMYCIN 250 MG/1
TABLET, FILM COATED ORAL
Qty: 6 TABLET | Refills: 0 | Status: SHIPPED | OUTPATIENT
Start: 2024-01-15

## 2024-04-05 ENCOUNTER — TELEPHONE (OUTPATIENT)
Dept: FAMILY MEDICINE CLINIC | Facility: CLINIC | Age: 49
End: 2024-04-05
Payer: COMMERCIAL

## 2024-04-05 NOTE — TELEPHONE ENCOUNTER
----- Message from Mary Alves sent at 4/4/2024  7:16 PM EDT -----  Regarding: Appointment Request ()  Contact: [phone]  Yes that would be fine

## 2024-04-12 ENCOUNTER — TELEPHONE (OUTPATIENT)
Dept: OBSTETRICS AND GYNECOLOGY | Facility: CLINIC | Age: 49
End: 2024-04-12
Payer: COMMERCIAL

## 2024-04-12 RX ORDER — LEVOTHYROXINE SODIUM 0.03 MG/1
25 TABLET ORAL DAILY
Qty: 10 TABLET | Refills: 0 | Status: SHIPPED | OUTPATIENT
Start: 2024-04-12

## 2024-04-12 RX ORDER — FAMOTIDINE 20 MG/1
20 TABLET, FILM COATED ORAL DAILY
Qty: 30 TABLET | Refills: 0 | OUTPATIENT
Start: 2024-04-12

## 2024-04-12 NOTE — TELEPHONE ENCOUNTER
Please inform pt, there are no available mammogram appointments in the morning on 5/24/24. Therefore, I r/s'd her annual exam to be done in the afternoon, after her mammogram.      On 5/24/24, mammogram at 1:45, annual exam w/Dr. Kim rescheduled to 2:00 pm.     If pt needs a morning appt, she will need to r/s to a different day.     Pt # [phone]

## 2024-04-12 NOTE — TELEPHONE ENCOUNTER
"Caller: Mary Alves \"Ara\"    Relationship to patient: Self    Best call back number: [phone]    Type of visit: MAMMOGRAM - HAS IMPLANTS    Requested date: 05/24/24      Additional notes:PT CALLED TO SCHEDULE ANNUAL AND NEEDED TO ADD MAMMOGRAM APPT. SCHEDULED THE APPT AFTER THE ANNUAL; CAN YOU SCHEDULE THE MAMMOGRAM BEFORE THE ANNUAL ON 05/24? PT ANNUAL PROVIDER APPT IS AT 10:50 ON 05/24 AND AS OF NOW THE MAMMOGRAM IS SCHEDULED FOR 05/24 @ 1:45.    PLEASE CALL PT IF WE CAN GET THIS APPT TIME CHANGED  "

## 2024-04-29 RX ORDER — LEVOTHYROXINE SODIUM 0.03 MG/1
25 TABLET ORAL DAILY
Qty: 30 TABLET | Refills: 0 | Status: SHIPPED | OUTPATIENT
Start: 2024-04-29

## 2024-04-29 NOTE — TELEPHONE ENCOUNTER
"  Caller: Mary Alves \"Ara\"    Relationship: Self    Best call back number: [phone]    Requested Prescriptions:   Requested Prescriptions     Pending Prescriptions Disp Refills    levothyroxine (SYNTHROID, LEVOTHROID) 25 MCG tablet 10 tablet 0     Sig: Take 1 tablet by mouth Daily. PT REQUIRES APPT PRIOR TO FURTHER REFILLS        Pharmacy where request should be sent: [id number] [place], [place] - [address] AT [facility] [phone] [facility] [phone]      Last office visit with prescribing clinician: 5/26/2022   Last telemedicine visit with prescribing clinician: Visit date not found   Next office visit with prescribing clinician: Visit date not found     Additional details provided by patient: PATIENT HAS A 1 DAY SUPPLY LEFT OF THIS MEDICATIONS.     Does the patient have less than a 3 day supply:  [x] Yes  [] No    Would you like a call back once the refill request has been completed: [] Yes [x] No    If the office needs to give you a call back, can they leave a voicemail: [] Yes [x] No    Juan Chavez Rep   04/29/24 10:33 EDT       "

## 2024-05-16 ENCOUNTER — OFFICE VISIT (OUTPATIENT)
Dept: FAMILY MEDICINE CLINIC | Facility: CLINIC | Age: 49
End: 2024-05-16
Payer: COMMERCIAL

## 2024-05-16 VITALS
TEMPERATURE: 97.9 F | HEART RATE: 79 BPM | OXYGEN SATURATION: 98 % | BODY MASS INDEX: 24.73 KG/M2 | SYSTOLIC BLOOD PRESSURE: 104 MMHG | WEIGHT: 167 LBS | RESPIRATION RATE: 16 BRPM | HEIGHT: 69 IN | DIASTOLIC BLOOD PRESSURE: 66 MMHG

## 2024-05-16 DIAGNOSIS — E03.9 HYPOTHYROIDISM, ACQUIRED: Primary | ICD-10-CM

## 2024-05-16 DIAGNOSIS — E55.9 VITAMIN D DEFICIENCY: ICD-10-CM

## 2024-05-16 DIAGNOSIS — Z00.00 LABORATORY EXAMINATION ORDERED AS PART OF A ROUTINE GENERAL MEDICAL EXAMINATION: ICD-10-CM

## 2024-05-16 DIAGNOSIS — F41.1 GENERALIZED ANXIETY DISORDER: ICD-10-CM

## 2024-05-16 PROCEDURE — 99214 OFFICE O/P EST MOD 30 MIN: CPT | Performed by: PHYSICIAN ASSISTANT

## 2024-05-16 RX ORDER — LEVOTHYROXINE SODIUM 0.03 MG/1
25 TABLET ORAL DAILY
Qty: 90 TABLET | Refills: 0 | Status: SHIPPED | OUTPATIENT
Start: 2024-05-16 | End: 2024-05-20 | Stop reason: SDUPTHER

## 2024-05-16 RX ORDER — NITROFURANTOIN 25; 75 MG/1; MG/1
100 CAPSULE ORAL EVERY 12 HOURS SCHEDULED
Qty: 14 CAPSULE | Refills: 5 | Status: SHIPPED | OUTPATIENT
Start: 2024-05-16

## 2024-05-16 RX ORDER — FLUCONAZOLE 150 MG/1
150 TABLET ORAL ONCE
Qty: 1 TABLET | Refills: 5 | Status: SHIPPED | OUTPATIENT
Start: 2024-05-16 | End: 2024-05-16

## 2024-05-16 RX ORDER — DESVENLAFAXINE SUCCINATE 50 MG/1
50 TABLET, EXTENDED RELEASE ORAL DAILY
Qty: 90 TABLET | Refills: 3 | Status: SHIPPED | OUTPATIENT
Start: 2024-05-16

## 2024-05-16 NOTE — PROGRESS NOTES
"Subjective   Mary Alves is a 48 y.o. female.     Hypothyroidism  Pertinent negatives include no diaphoresis.   Anxiety   Symptoms include nervous/anxious behavior.  Patient reports no suicidal ideas. Her past medical history is significant for depression.   DepressionSymptoms include nervousness/anxiety. Patient is not experiencing: choking sensation and suicidal ideas.  Her past medical history is significant for depression.         Since the last visit, she has overall felt well.  She has Hypothyroidism and must update labs to continue treatment and Vitamin D deficiency and will update labs for continued management.  she has been compliant with current medications have reviewed them.  The patient denies medication side effects.  Will refill medications. /66   Pulse 79   Temp 97.9 °F (36.6 °C)   Resp 16   Ht 175.3 cm (69\")   Wt 75.8 kg (167 lb)   SpO2 98%   BMI 24.66 kg/m² .  BMI is within normal parameters. No other follow-up for BMI required.    Has been to ER for foot pain---xray---DR Thomas , Ortho    Went to ER for Hemorrhagic cystitis 6-24-23  Had colonoscopy Dr KEDAR Bustillo 9-30-22 ---repeat 5 yrs  Lipoma removed neck 8-9-22---DR Villalpando  Cubital tunnel release with Dr. Martinez right and carpal tunnel 6/22/2022  Results for orders placed or performed in visit on 05/22/23   NuSwab BV & Candida - Swab, Vagina    Specimen: Vagina; Swab    [facility]     CD- [id number]   Result Value Ref Range    Atopobium Vaginae Low - 0 Score    BVAB 2 Low - 0 Score    Megasphaera 1 Low - 0 Score    Candida Albicans, NIC Negative Negative    Candida Glabrata, NIC Negative Negative   Urine Culture - ,    [facility]     CD- [id number]   Result Value Ref Range    Urine Culture Final report     Result 1 No growth    HIV-1 / O / 2 Ag / Antibody 4th Generation    Specimen: Blood   Result Value Ref Range    HIV Screen 4th Gen w/RFX (Reference) Non Reactive Non Reactive   RPR    Specimen: Blood   Result Value Ref Range    RPR Non " Reactive Non Reactive   IGP,CtNgTv,Apt HPV    Specimen: Cervix; ThinPrep Vial    ThinPrep   Result Value Ref Range    Diagnosis Comment     Specimen adequacy: Comment     Clinician Provided ICD-10: Comment     Performed by: Comment     . .     Note: Comment     Method: Comment     HPV Aptima Negative Negative    Chlamydia, Nuc. Acid Amp Negative Negative    Gonococcus by Nucleic Acid Amp Negative Negative    Trichomonas vaginosis Negative Negative       Had echo 10-14-21    Left Ventricle: LV EF is 71 %, assessed by modified Eli's biplane.   Normal size and tthickness. Normal diastolic dysfunction     No significant vavlular pathology     No pericardial effusion  Sinus bradycardia EKG 10-14-21;  Had SVT  CT head 10-14-21 normal  Cardio note 3-4-22  Pt presented to ER with complaints of mid epigastric pain. Pt described it as sharp, tightness in mid epigastric region with some shortness of breath. 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 Pt reported she ate 3 eggs for breakfast about an hour before pain started. Pt had diaphoresis and palpitations with the pain. Pt had a near syncopal episode and when her blood pressure was checked it was in the 80s. Pt was also nauseated.  Patient had a rhythm strip done up in the unit which did not show definitive P waves and QRS was slightly widened.  Pt was sent to ER. Pt had a similar episode in September and she had an ECHO and a ZIO. Per pt the ECHO was OK. She did not find out the ZIO results because she was on a traveling nurse contract and returned here. Pt reported she works out regularly. EKG showed NSR 63, some subtle J point elevation in leads II and III. Flipped T waves in 1 and AVL, Troponin negative x 2.  Patient's initial EKG showed potential changes however repeat ECG was exactly the same as it was in 2020.  EKG that was overcorrection did have some baseline artifact it was not definitive.  Patient also became incredibly diaphoretic  set her nursing gown had become soaked.  Patient does use some supplements for her bodybuilding does include L-arginine.  Patient has been utilizing this for years.  Patient's pain has now localized into her mid abdomen area.  Patient is never had chest discomfort   My nurse was able to find the Zio patch results that was placed on patient when she was in [place].  It showed some brief runs of SVTs as well as a right bundle.  Dr. Jameson it was nice enough to review both the current rhythm strips as well as the Zio patch.  He is in agreement with my assessment at this point no further work-up from a cardiovascular standpoint is necessary.  I spoke with patient told her to stop all of her supplements follow-up with me in about 2 to 4 weeks.  At that time we will readjust her supplementations and see how she is clinically doing.  We will sign off from a cardiovascular standpoint please call if there is questions or issues.  Arnel Cee MD  359pm   CT abd and pelvis with contrast  FINDINGS: The radiation dose reduction system was utilized for each   scan per the ALARA (as low as reasonably achievable) protocol.       The liver, spleen, kidneys, adrenal glands, and pancreas have a   normal appearance. No calcified gallstone or biliary duct enlargement   is present..      No dilated bowel loops are present. The appendix has a normal   appearance. No retroperitoneal lymph node enlargement is detected.     No calculus demonstrated in either distal ureter or within the   bladder.     Uterus not enlarged. No pelvic masses or fluid collections   demonstrated.     Despite distention of the bladder there is circumferential bladder   wall thickening and surrounding edema finding associated with acute   cystitis.     CONCLUSION:   1. Abnormal appearance of the bladder may be seen with acute cystitis.   2. No ureteral calculus or hydronephrosis.   US GB 3-4-22  Liver echotexture suggests possible fatty  "infiltration.  2. The gallbladder is normal, no biliary duct dilatation.     Note hx runs of SVT   Mary Alves female 48 y.o., /66   Pulse 79   Temp 97.9 °F (36.6 °C)   Resp 16   Ht 175.3 cm (69\")   Wt 75.8 kg (167 lb)   SpO2 98%   BMI 24.66 kg/m²   who presents today for follow up of Anxiety.  She reports medication is working well, patient desires to continue on Rx, and needs refill. Onset of symptoms was approximately several years ago. She denies current suicidal and homicidal ideation. Risk factors are family history of anxiety and or depression and lifestyle of multiple roles.  Previous treatment includes current Rx. She complains of the following medication side effects:none. The patient declines to go to counseling..    Worse with Zoloft Prozac   The following portions of the patient's history were reviewed and updated as appropriate: allergies, current medications, past family history, past medical history, past social history, past surgical history, and problem list.    Review of Systems   Constitutional:  Negative for diaphoresis.   HENT:  Negative for nosebleeds and trouble swallowing.    Eyes:  Negative for blurred vision and visual disturbance.   Respiratory:  Negative for choking.    Gastrointestinal:  Negative for blood in stool.   Allergic/Immunologic: Negative for immunocompromised state.   Neurological:  Negative for facial asymmetry and speech difficulty.   Psychiatric/Behavioral:  Negative for self-injury and suicidal ideas. The patient is nervous/anxious.        Objective   Physical Exam  Vitals and nursing note reviewed.   Constitutional:       General: She is not in acute distress.     Appearance: Normal appearance. She is well-developed. She is not ill-appearing or toxic-appearing.   HENT:      Head: Normocephalic.      Right Ear: External ear normal.      Left Ear: External ear normal.      Nose: Nose normal.      Mouth/Throat:      Pharynx: Oropharynx is clear.   Eyes: "      General: No scleral icterus.     Conjunctiva/sclera: Conjunctivae normal.      Pupils: Pupils are equal, round, and reactive to light.   Neck:      Thyroid: No thyromegaly.   Cardiovascular:      Rate and Rhythm: Normal rate and regular rhythm.      Pulses: Normal pulses.      Heart sounds: Normal heart sounds. No murmur heard.  Pulmonary:      Effort: Pulmonary effort is normal. No respiratory distress.      Breath sounds: Normal breath sounds. No rales.   Musculoskeletal:         General: No deformity. Normal range of motion.      Cervical back: Normal range of motion and neck supple.      Right lower leg: No edema.      Left lower leg: No edema.   Skin:     General: Skin is warm and dry.      Findings: No rash.   Neurological:      General: No focal deficit present.      Mental Status: She is alert and oriented to person, place, and time. Mental status is at baseline.   Psychiatric:         Mood and Affect: Mood normal.         Behavior: Behavior normal.         Thought Content: Thought content normal.         Judgment: Judgment normal.           Assessment & Plan   Diagnoses and all orders for this visit:    1. Hypothyroidism, acquired (Primary)  -     Comprehensive metabolic panel  -     Lipid panel  -     CBC and Differential  -     TSH  -     T4, Free  -     Vitamin B12  -     Folate  -     Urinalysis With Microscopic - Urine, Clean Catch  -     T3, Free  -     Vitamin D,25-Hydroxy    2. Vitamin D deficiency  -     Comprehensive metabolic panel  -     Lipid panel  -     CBC and Differential  -     TSH  -     T4, Free  -     Vitamin B12  -     Folate  -     Urinalysis With Microscopic - Urine, Clean Catch  -     T3, Free  -     Vitamin D,25-Hydroxy    3. Generalized anxiety disorder  -     Comprehensive metabolic panel  -     Lipid panel  -     CBC and Differential  -     TSH  -     T4, Free  -     Vitamin B12  -     Folate  -     Urinalysis With Microscopic - Urine, Clean Catch  -     T3, Free  -      Vitamin D,25-Hydroxy    4. Laboratory examination ordered as part of a routine general medical examination  -     Comprehensive metabolic panel  -     Lipid panel  -     CBC and Differential  -     TSH  -     T4, Free  -     Vitamin B12  -     Folate  -     Urinalysis With Microscopic - Urine, Clean Catch  -     T3, Free  -     Vitamin D,25-Hydroxy    Other orders  -     levothyroxine (SYNTHROID, LEVOTHROID) 25 MCG tablet; Take 1 tablet by mouth Daily. Before breakfast for thyroid  Dispense: 90 tablet; Refill: 0  -     desvenlafaxine (Pristiq) 50 MG 24 hr tablet; Take 1 tablet by mouth Daily. For anxiety  Dispense: 90 tablet; Refill: 3  -     fluconazole (Diflucan) 150 MG tablet; Take 1 tablet by mouth 1 (One) Time for 1 dose. One PO X 1 for yeast infection  Dispense: 1 tablet; Refill: 5  -     nitrofurantoin, macrocrystal-monohydrate, (MACROBID) 100 MG capsule; Take 1 capsule by mouth Every 12 (Twelve) Hours. For UTI  Dispense: 14 capsule; Refill: 5      I will put Macrobid and Diflucan on for now she gets hemorrhagic cystitis very easily and this works well  Continue Pristiq for anxiety works well at 50 mg  Will send additional refills on levothyroxine after labs resulted  Plan, Mary Alves, was seen today.  she was seen for Hypothyroidism and will need to update labs for continued treatment and Vitamin D deficiency and supplemented.             Answers submitted by the patient for this visit:  Other (Submitted on 5/16/2024)  Please describe your symptoms.: Need refil on synthroid  Have you had these symptoms before?: No  How long have you been having these symptoms?: 1-4 days  Please list any medications you are currently taking for this condition.: Synthroid , Progesterona , Estrogen  Primary Reason for Visit (Submitted on 5/16/2024)  What is the primary reason for your visit?: Other

## 2024-05-19 ENCOUNTER — E-VISIT (OUTPATIENT)
Dept: FAMILY MEDICINE CLINIC | Facility: TELEHEALTH | Age: 49
End: 2024-05-19
Payer: COMMERCIAL

## 2024-05-19 PROCEDURE — BRIGHTMDVISIT: Performed by: NURSE PRACTITIONER

## 2024-05-19 RX ORDER — NAPROXEN 500 MG/1
500 TABLET ORAL 2 TIMES DAILY WITH MEALS
Qty: 30 TABLET | Refills: 0 | Status: SHIPPED | OUTPATIENT
Start: 2024-05-19

## 2024-05-19 RX ORDER — ONDANSETRON 8 MG/1
8 TABLET, ORALLY DISINTEGRATING ORAL EVERY 8 HOURS PRN
Qty: 10 TABLET | Refills: 0 | Status: SHIPPED | OUTPATIENT
Start: 2024-05-19

## 2024-05-19 NOTE — EXTERNAL PATIENT INSTRUCTIONS
View Doctor's Note     Note   I have sent you in some Naproxsyn and Zofran   Diagnosis   Migraine   My name is VANCE Stack, and I'm a healthcare provider at [facility]. Based on your interview responses, I see that you have a migraine.   I've given you a doctor's note for 2 days.   About your diagnosis   Nearly 90% of all headaches can be placed into one of three categories: migraines, tension-type headaches, and cluster headaches. Common signs and symptoms of a migraine include:    Significant pain on one side of the head. However, in about 30% of migraine sufferers, pain is felt on both sides of the head.    Pain that comes on gradually. That pain may build up over several hours to several days, from dull, deep, and steady to pulsating or throbbing.    Nausea or vomiting.    Sensitivity to bright lights or noise.    Pain that feels worse with physical activity.    An aura. These are symptoms that occur before the headache begins and may include flashing lights or bright spots, strange smells, and tingling in the lips, tongue, or fingers.   Scientists and medical professionals don't really know what causes migraines. You mentioned that migraine headaches run in your family. Both genetics and environmental factors seem to play a role. Women are three times more likely to have migraines than men. For some women, their migraine attacks began or became worse during a pregnancy.   Migraines may be severe and uncomfortable at the time, but they don't cause any lasting effects.   What to expect   Most migraine headaches last a few hours. In some cases, migraines may take longer to get better, sometimes up to a few days.   When to seek care   Call us at [phone]   with any sudden or unexpected symptoms.    It still hurts to do Valsalva maneuvers (like popping your ears) after 24 hours.    Your headache comes with seizures, loss of consciousness, mental confusion, trouble speaking, or vision  "changes.    Your headache is associated with a fever over 103F or a fever that lasts more than 3 days.    Your headache comes on suddenly (sometimes called a \"thunderclap\") and is extremely painful.    Your headache can be described as the \"worst headache of your life.\"    Your headache gets worse immediately after doing some intense physical activity (such as jogging, weight lifting, or having sex).    You mentioned your headache has one or more of these features: it wakes you up, it's worse in the morning or when you bend over, or it's better when you lie down and worse when you're upright. There's a small chance your headache may be due to a more complicated condition. Call us immediately if your headache gets worse or doesn't get better within 24 hours of following your treatment plan.    You have headaches on 15 or more days per month over a 3-month period.    You have changes in the location of your headache pain, severity of pain, frequency of headache, or you develop other symptoms associated with your headache.   Other treatment   Taking headache medications on more than 3 days a week can cause \"medication-overuse\" or \"rebound\" headaches. If you need to take more medications or take medications more often, contact us to make an appointment.   People with migraine headaches often feel better if they lie down in a dark, quiet room. Non-traditional therapies can also be helpful. These therapies include acupuncture, biofeedback (which teaches you how to monitor and manage your stress), massage therapy, and cognitive behavioral therapy.   Prevention   Migraine headaches may be triggered by:    Hormonal changes due to menstruation, pregnancy, or contraceptives (such as birth control pills).    Stress.    Hunger.    Changes in sleep patterns, including both lack of sleep (fatigue) and getting too much sleep.    Weather changes.    Bright lights or other visual stimuli.    Consuming certain foods or drinks, " especially those that contain the sweetener aspartame. Processed foods and alcohol, especially wine and highly carbonated beverages, may also trigger migraines.   Because there are many different migraine triggers, some people find it helpful to keep a headache diary. Keep track of when you have headaches and write down notes about food, sleep, stress, and the weather. A headache diary can help you figure out your migraine triggers so you can try to avoid them.   The treatment plan that works best for you will be one that's tailored to you and your triggers. You may find that a triptan medication works for you. Or you may find that taking ibuprofen as soon as you feel a headache coming on helps your symptoms. It may take some trial and error to find the best prevention and treatment plan. Keeping a headache diary is especially helpful while you figure this out.   Your provider   Your diagnosis was provided by VANCE Stack, a member of your trusted care team at [facility].   If you have any questions, call us at [phone]  .   View Doctor's Note     Expires on 06/18/24

## 2024-05-19 NOTE — E-VISIT TREATED
Chief Complaint: Migraine or headache   Patient introduction   Patient is 48-year-old female who has bilateral headache pain.   Warning. The following may warrant further investigation:    Headaches have worsened by increasing in intensity and interfering more with everyday activities    Pain wakes patient from sleep   General presentation   Patient's headache pain is severe. Headache has lasted 4 to 72 hours. Headache reached maximum intensity after more than 1 hour. Headache pain described as band-like pressure and tightness. Headache pain is worse with Valsalva maneuvers.   Pain is not brought on by intense physical activity. No fever, nasal congestion, nasal discharge, or facial pain.   Migraine features:    Pain is accompanied by nausea and decreased ability to perform regular daily activities.    No prodrome present in the 1 to 2 days leading up to headache, including no constipation, depression, euphoria, food cravings, increased yawning, irritability, or neck stiffness.    No auditory aura, motor aura, negative aura, somatosensory aura, visual aura, or changes in speech.    Has postdrome feeling drained or exhausted.    No blurry vision with neck flexion, diminished peripheral vision, double vision, severe reduction in vision, halos, or complete vision loss in one eye.   Potential triggers: visual stimuli and sleep pattern changes.   Patient has had similar headaches in the past. Has not seen a healthcare provider for their headaches. Patient has not had a headache every day in the past month. In the past 3 months, patient has had headaches on 2 to 14 days per month.   Family history of migraine.   Patient is using medications 0 to 1 days per week to treat their headache.   Prescription medications:   Has not used dihydroergotamine, eletriptan, metoclopramide, naratriptan, prochlorperazine, rizatriptan, sumatriptan, sumatriptan-naproxen, or zolmitriptan for headache symptoms.   Non-prescription  medications:   The following non-prescription medications were helpful for headache symptoms:    Ibuprofen    Naproxen sodium   The following non-prescription medications were not helpful for headache symptoms:    Acetaminophen   Review of red flags/alarm symptoms:    No exposure to carbon monoxide    No recent head trauma    No unexplained fever    No chronic night sweats    No prolonged, severe fatigue    No unintentional weight loss    No confusion    No impaired alertness    No altered speech    No loss of coordination    No prolonged limb numbness    No seizures    No diaphoresis or tachycardia    No current use of blood thinning medication    No exposure to toxic chemicals/ingredients   Self-exam:    No nuchal rigidity    Able to walk on toes/heels    Able to rise from a seated position without support    No pericranial muscle tenderness in the forehead, jaw, occiput, neck, or upper shoulders   Pregnancy/menstrual status/breastfeeding:   Patient is menopausal.   Current medications   Currently taking multivitamin with minerals tablet tablet, Estrogel 0.75 MG/1.25 GM (0.06%) topical gel, OMEGA-3 FATTY ACIDS PO, Progesterone 100 MG capsule, ibuprofen 800 MG tablet, levothyroxine 25 MCG tablet, and famotidine 20 MG tablet.   Medication allergies   None.   Medication contraindication review   No history of ASA- or NSAID-induced asthma or urticaria, aspirin triad, bone marrow depression, phenothiazine-induced blood dyscrasia, catecholamine-releasing paraganglioma, coagulation disorder, depression, G6PD deficiency, GI bleeding, GI obstruction, uncontrolled hypertension, Parkinson's disease, Reye syndrome, or seizure disorder.   No history of cardiac accessory conduction pathway disorder, cerebrovascular disease, coronary vasospasm, chronic kidney disease, hepatic impairment (severe), ischemic bowel disease, ischemic heart disease, migraine (basilar or hemiplegic), PVD, sepsis, vascular surgery (recent), or WPW  syndrome.   Past medical history   Immune conditions: No immunocompromising conditions.   No history of cancer.   No COPD, Lyme disease, obesity hypoventilation syndrome, or obstructive sleep apnea.   Social history   Patient does not smoke tobacco. No recreational drug use.   Patient requests a 2-day excuse note.   Assessment   Migraine.   This is the likely diagnosis based on patient's interview responses, including:    Severe intensity    Presence of nausea and decreased ability to perform regular daily activities    Duration of headache 4 to 72 hours    Family history of migraine   Plan   Medications:   No medications prescribed.   Other:   Patient was given an excuse note for 2 days.   Education:    Condition and causes    Prevention    Treatment and self-care    When to call provider, including strict return precautions if pain hasn't resolved within 24 hours   ----------   Electronically signed by VANCE Stack on 2024-05-19 at 16:44PM   ----------   Patient Interview Transcript:   Where do you feel your headache pain? Select one.    On both sides of my head (or all over)   Not selected:    On one side of my head (including forehead or back of head)    I'm not sure   How long have you had this headache? Select one.    4 hours to 3 days   Not selected:    Less than 30 minutes    30 minutes to 3 hours    More than 3 days    I don't currently have a headache   How would you describe your headache? Select all that apply.    Band-like pressure or tightening    It feels like I'm wearing a tight cap   Not selected:    Dull pain    A feeling of head fullness    Pressure-like pain    It's throbbing or pulsating    None of the above   On a scale of 1 to 10, how severe is your headache pain? If you have a very severe headache, you may need to be seen in person. Select one.    Severe; intense pain that prevents me from doing some everyday activities   Not selected:    Mild; pain is noticeable and distracting,  but I can do everyday activities    Moderate; strong pain that makes everyday activities harder    _Unbearable; intense pain that prevents me from doing anything _    The worst headache of my life   How long did it take for your headache to reach the greatest intensity? The pain of most headaches builds gradually. In rare cases, headache pain comes on suddenly, like a clap of thunder or an explosion, and the pain reaches greatest intensity within 1 minute. Select one.    Longer than 1 hour   Not selected:    Less than 1 minute    1 to 5 minutes    5 to 30 minutes    30 to 60 minutes   Do you currently have any of these symptoms? Select all that apply.    Nausea    Decreased ability to work, study, or do regular daily activities for at least one day   Not selected:    Vomiting    Sensitivity to light    Sensitivity to noise    Headache pain that's worse with physical activity (walking or climbing stairs)    None of the above   Are any of these true for your headache? Select all that apply.    It wakes me from sleep   Not selected:    It's gotten better, but it hasn't gone away    It went away, but then it came back    It's worse in the morning    It's worse when I bend over    It's worse when I'm upright (and better when I'm lying down)    None of the above   Do you currently have any of these symptoms? Select all that apply.    None of these   Not selected:    Fever    Nasal congestion    Thick yellow or green nasal drainage (mucus)    Pain around the eyes or cheeks   Do you currently have any of these symptoms? Select all that apply.    None of the above   Not selected:    Confusion    Difficulty staying awake or alert    Difficulty speaking or understanding speech    Difficulty walking    Numbness, tingling, or weakness in any of your limbs that doesn't go away    Seizures    Severe jaw pain    Painful rash that looks like a band of blisters on the same side of your head or face as your headache   Headaches can be  "a sign of a serious underlying condition. Within the last month, have you had any of these symptoms? Select all that apply.    None of the above   Not selected:    Unexplained fever (no clear source or obvious infection)    Recurring night sweats    Severe fatigue that doesn't improve with rest    Unintentional weight loss   Have you had any of these vision changes? Select all that apply.    None of the above   Not selected:    Blurry vision when moving your chin toward your chest    Loss of peripheral vision (can't see out of the corners of the eyes)    Double vision    Severe difficulty seeing    Seeing halos (bright circles) around lights    Complete loss of vision in one eye   Sore or tight muscles in the neck and shoulders can play a part in causing headaches. Gently press against your head and neck muscles, making small circular movements on the muscles. Do this on both your right and left sides.    None of the above   Not selected:    Forehead    Jaw    Back of head    Neck    Upper shoulder   Is your headache pain worse with Valsalva maneuvers? A common Valsalva maneuver involves closing your mouth, plugging your nose shut, and breathing out gently in a way that \"pops\" your ears. Bearing down as if having a bowel movement is another example of a Valsalva maneuver. Select one.    Yes   Not selected:    No    I'm not sure   Can you move your chin toward your chest?    Yes   Not selected:    No, my neck is too stiff   We'd like you to perform three simple tasks: 1. Get up from a seated position without using your arms (or anyone's help) to push yourself up 2. Walk on your toes for 10 steps 3. Walk on your heels for 10 steps Can you perform all three tasks? Select one.    Yes   Not selected:    No, I can't do one or more of the tasks   Do any of these seem to trigger your headache symptoms? Select all that apply.    Bright lights or visual stimuli    Changes in sleep pattern   Not selected:    Alcohol   "  Artificial sweeteners (aspartame)    Certain foods, such as cured meats or cheese    Certain smells    Hunger    Stress    Weather changes    No, not that I know of   Does your headache only occur during or after intense physical activity (jogging, weight lifting, or having sex)? Select one.    No   Not selected:    Yes   In the last 2 weeks, have you had any kind of injury or trauma to your head? Examples of injuries might include hitting your head or being hit in the head. Select one.    No   Not selected:    Yes   In the last month, have you had any known exposure to toxic chemicals? For example, this might include accidentally swallowing antifreeze or inhaling paint fumes. Select one.    No, not that I know of   Not selected:    Yes   Have you had any recent exposure to carbon monoxide? Common sources of carbon monoxide include motor vehicle exhaust, smoke from fires, engine fumes, and non-electric heaters. Select one.    No, not that I know of   Not selected:    Yes   Have you ever had a similar headache in the past? By similar, we mean the location of the pain is the same, the headache feels the same, and the associated symptoms are the same. Select one.    Yes   Not selected:    No   Does it feel like your headaches have gotten worse? For example, are they coming on faster, happening more often, or becoming more painful? Select one.    Yes, definitely   Not selected:    No, not necessarily    I'm not sure   How have your headaches gotten worse? Select all that apply.    My headache pain is getting more intense    My headaches are interfering more with my ability to do everyday activities   Not selected:    I have more frequent headaches    My headaches are coming on more quickly    Other (specify)   Over the past month, have you had a headache every day? Select one.    No   Not selected:    Yes   Over the past 3 months, how many days per month have you had a headache? Select one.    2 to 14 days per month    "Not selected:    1 day or less per month    15 or more days per month   How many days per week do you use medication for a headache? This includes both non-prescription and prescription medications. Select one.    0 to 1 day per week   Not selected:    1 to 2 days per week    3 or more days per week    I haven't used any medications   Have you ever seen a healthcare provider for your headaches? Select one.    No   Not selected:    Yes   Do you have a family history of migraine headaches? Select one.    Yes   Not selected:    No, not that I know of   In the 1 to 2 days leading up to your headaches, do you have any of these symptoms? Select all that apply.    No, not that I've noticed   Not selected:    Constipation    Depression    Euphoria (feeling intensely excited or happy)    Food cravings    Increased yawning    Irritability    Neck stiffness   Just before or at the beginning of your headaches, do you have any of these symptoms? Some headaches can be associated with other sensory or physical symptoms. They usually last no longer than an hour and then go away completely. Select all that apply.    None of the above   Not selected:    Ringing in your ears or hearing other noises or music (auditory aura)    Jerking or other repetitive physical movements (motor aura)    Loss of vision, hearing, feeling, or ability to move a body part (negative aura)    Burning, pain, or tingling on any part of your body (somatosensory aura)    Seeing bright lines, shapes, or objects (visual aura)    Difficulty finding words or other changes in speech   Once the headache pain goes away, do you have any of these symptoms? Select all that apply.    Feeling drained or exhausted   Not selected:    Mild euphoria (excitement or happiness)    Pain in the location of the previous headache with sudden head movement    None of the above   Are your headaches associated with \"attacks\" or \"spells\" of excessive sweating or fast heart rate? Select " all that apply.    No, neither of these   Not selected:    Excessive sweating    Fast heart rate   To make a treatment plan that's safe for you, we need to ask about your health history. Do you regularly take any blood thinning medications? Examples include aspirin, Coumadin (warfarin), Aggrenox (aspirin/dipyridamole), Plavix (clopidogrel), Pradaxa (dabigatran), Xarelto (rivaroxaban), Eliquis (apixaban) and Savaysa (edoxaban). Select one.    No   Not selected:    Yes   Do you smoke tobacco? Select one.    No, never   Not selected:    Yes, every day    Yes, some days    No, I quit   In the last month, have you used amphetamines, cocaine, hallucinogens, heroin, or other opioids? Your response to this question will be shared with your care provider only. Select one.    No   Not selected:    Yes   Do you have any of these conditions? Select all that apply.    None of the above   Not selected:    Chronic obstructive pulmonary disease (COPD)    Lyme disease    Obesity hypoventilation syndrome (also known as Pickwickian syndrome)    Obstructive sleep apnea   Do you have any of these conditions? Scroll to see all options. Select all that apply.    None of the above   Not selected:    Aspirin- or NSAID-induced asthma or urticaria    Aspirin triad, Samter's triad, or aspirin-exacerbated respiratory disease (AERD)    Bone marrow depression    Phenothiazine-induced anemia, leukemia, lymphoma, or myeloma    Catecholamine-releasing paraganglioma    Blood clotting disorder    Depression    G6PD deficiency    Gastrointestinal (GI) bleeding    Gastrointestinal (GI) obstruction    Uncontrolled hypertension    Parkinson's disease    Reye syndrome    Seizure disorder   Do you have any of these conditions that can affect the immune system? Scroll to see all options. Select all that apply.    None of these   Not selected:    History of bone marrow transplant    Chronic kidney disease    Chronic liver disease (including cirrhosis)     HIV/AIDS    Inflammatory bowel disease (Crohn's disease or ulcerative colitis)    Lupus    Moderate to severe plaque psoriasis    Multiple sclerosis    Rheumatoid arthritis    Sickle cell anemia    Alpha or beta thalassemia    History of kidney, liver, heart, or other solid organ transplant    History of liver, heart, or other solid organ transplant    History of spleen removal    An autoimmune disorder not listed here (specify)    A condition requiring treatment with long-term use of oral steroids (such as prednisone, prednisolone, or dexamethasone) (specify)   Have you ever been diagnosed with cancer? Select one.    No   Not selected:    Yes, I have cancer now    Yes, but I'm in remission   Have you had any of these conditions or procedures? Scroll to see all options. Select all that apply.    None of the above   Not selected:    Cardiac accessory conduction pathway disorder-associated arrhythmia    Cerebrovascular disease (stroke or TIA)    Coronary vasospasm (angina)    Ischemic bowel disease    Ischemic heart disease (history of myocardial infarction or heart attack)    Kidney disease (chronic)    Liver disease    Metoclopramide-associated dystonic reaction    Migraine diagnosed as basilar or hemiplegic    Peripheral vascular disease (PVD)    Sepsis    Tardive dyskinesia    Vascular surgery (recent)    Reg-Parkinson-White (WPW) syndrome   Have you gone through menopause? Select one.    I'm going through it now   Not selected:    Yes    No   Have you used any non-prescription medications for your headache symptoms? Select one.    Yes   Not selected:    No   Acetaminophen (Tylenol)    Not helpful   Not selected:    Helpful   Ibuprofen (Advil, Motrin)    Helpful   Not selected:    Not helpful   Naproxen sodium (Aleve)    Helpful   Not selected:    Not helpful   Have you used any prescription medications for your headache symptoms? Select one.    Yes   Not selected:    No   Are you still taking these medications  listed in your medical record? If you're not taking any of these, click Next. Select all that apply.    multivitamin with minerals tablet tablet    Estrogel 0.75 MG/1.25 GM (0.06%) topical gel    OMEGA-3 FATTY ACIDS PO    Progesterone 100 MG capsule    ibuprofen 800 MG tablet    levothyroxine 25 MCG tablet    famotidine 20 MG tablet   Are you taking any other medications, vitamins, or supplements? Select one.    No   Not selected:    Yes   Have you ever had an allergic or bad reaction to any medication? Select one.    No   Not selected:    Yes   Do you need a doctor's note? A doctor's note confirms that you received care today and states when you can return to school or work. It does not contain information about your diagnosis or treatment plan. Your provider will make the final decision on whether to give you a doctor's note. Doctor's notes CANNOT be backdated. Select one.    Today and tomorrow (2 days)   Not selected:    Today only (1 day)    3 days    No   Is there anything you'd like to add about your symptoms? Please limit your comments to the symptoms covered in this interview. If you include comments about other concerns, your provider may recommend that you be seen in person.   The patient did not enter any additional information.   ----------   Medical history   Medical history data does not currently exist for this patient.

## 2024-05-20 RX ORDER — LEVOTHYROXINE SODIUM 0.03 MG/1
25 TABLET ORAL DAILY
Qty: 15 TABLET | Refills: 0 | Status: SHIPPED | OUTPATIENT
Start: 2024-05-20

## 2024-05-24 ENCOUNTER — TELEPHONE (OUTPATIENT)
Dept: OBSTETRICS AND GYNECOLOGY | Facility: CLINIC | Age: 49
End: 2024-05-24

## 2024-05-24 NOTE — TELEPHONE ENCOUNTER
Provider:  DR RIVAS    Caller: JAZIEL LAGUNA    Phone Number: [phone]    Reason for Call: SAME DAY CANCELLATION//NOT ABLE TO MAKE IT//RESCHEDULED

## 2024-05-30 RX ORDER — DESVENLAFAXINE SUCCINATE 50 MG/1
50 TABLET, EXTENDED RELEASE ORAL DAILY
Qty: 90 TABLET | Refills: 3 | Status: SHIPPED | OUTPATIENT
Start: 2024-05-30

## 2024-07-02 ENCOUNTER — PROCEDURE VISIT (OUTPATIENT)
Dept: OBSTETRICS AND GYNECOLOGY | Facility: CLINIC | Age: 49
End: 2024-07-02
Payer: COMMERCIAL

## 2024-07-02 ENCOUNTER — OFFICE VISIT (OUTPATIENT)
Dept: OBSTETRICS AND GYNECOLOGY | Facility: CLINIC | Age: 49
End: 2024-07-02
Payer: COMMERCIAL

## 2024-07-02 VITALS — BODY MASS INDEX: 24.66 KG/M2 | WEIGHT: 167 LBS | DIASTOLIC BLOOD PRESSURE: 84 MMHG | SYSTOLIC BLOOD PRESSURE: 137 MMHG

## 2024-07-02 DIAGNOSIS — Z79.890 PREMATURE MENOPAUSE ON HRT: ICD-10-CM

## 2024-07-02 DIAGNOSIS — Z00.00 ANNUAL PHYSICAL EXAM: Primary | ICD-10-CM

## 2024-07-02 DIAGNOSIS — Z12.31 VISIT FOR SCREENING MAMMOGRAM: Primary | ICD-10-CM

## 2024-07-02 DIAGNOSIS — E28.319 PREMATURE MENOPAUSE ON HRT: ICD-10-CM

## 2024-07-02 DIAGNOSIS — Z01.419 PAP TEST, AS PART OF ROUTINE GYNECOLOGICAL EXAMINATION: ICD-10-CM

## 2024-07-02 RX ORDER — PROGESTERONE 100 MG/1
100 CAPSULE ORAL DAILY
Qty: 30 CAPSULE | Refills: 11 | Status: SHIPPED | OUTPATIENT
Start: 2024-07-02

## 2024-07-02 NOTE — PROGRESS NOTES
HPI   Mary Alves  is a 49 y.o. female who presents for a routine gynecologic exam.  Overall, she is feeling well.  Bowels and bladder are functioning normally.  Mammogram was performed today.  The patient has a colonoscopy regularly due to history of polyps.  She is taking transdermal hormone replacement and would like to discuss alternative routes of delivery.    Chief Complaint   Patient presents with    Annual Exam     Last ae 23       Past Medical History:   Diagnosis Date    Abnormal ECG 2021    Right bundle branch block    Asthma     Breast mass     Benign tumor removed    Colon polyp 2021    4 benign polyps removed    Hypothyroidism     Lipoma     ANTERIOR NECK    Ovarian cyst     PONV (postoperative nausea and vomiting)     Slow to wake up after anesthesia        Past Surgical History:   Procedure Laterality Date    AUGMENTATION MAMMAPLASTY      BLADDER REPAIR      BLADDER SLING    BREAST AUGMENTATION      Rupture implant  revision done    BREAST BIOPSY       SECTION      Twins    COLONOSCOPY      ELBOW PROCEDURE Right 2022    ENDOSCOPY N/A 2022    Procedure: ESOPHAGOGASTRODUODENOSCOPY WITH BIOPSY;  Surgeon: Dandre Varela MD;  [place] [facility] [place]  Service: Gastroenterology;  Laterality: N/A;  MILD GASTRITIS    EXCISION MASS HEAD/NECK N/A 2022    Procedure: Excision anterior neck lipoma;  Surgeon: Jolanta Villalpando MD;  Location: [facility] MAIN OR;  Service: General;  Laterality: N/A;    FRACTURE SURGERY      Right elbow    KNEE SURGERY Right     OOPHORECTOMY         Social History     Socioeconomic History    Marital status:    Tobacco Use    Smoking status: Never     Passive exposure: Never    Smokeless tobacco: Never   Vaping Use    Vaping status: Never Used   Substance and Sexual Activity    Alcohol use: Yes     Comment: occasionally    Drug use: No    Sexual activity: Yes     Partners: Male     Birth control/protection: Condom, None        The following portions of the patient's history were reviewed and updated as appropriate: allergies, current medications, past family history, past medical history, past social history, past surgical history and problem list.    Review of Systems   Constitutional: Negative.    HENT: Negative.     Respiratory: Negative.     Cardiovascular: Negative.    Gastrointestinal: Negative.    Genitourinary: Negative.    Musculoskeletal: Negative.    Skin: Negative.    Allergic/Immunologic: Negative.    Psychiatric/Behavioral: Negative.               Physical Exam  Vitals and nursing note reviewed.   Constitutional:       Appearance: She is well-developed.   HENT:      Head: Normocephalic and atraumatic.   Cardiovascular:      Rate and Rhythm: Normal rate and regular rhythm.   Pulmonary:      Effort: Pulmonary effort is normal.      Breath sounds: Normal breath sounds. No wheezing or rales.   Chest:      Comments: Implants are in place bilaterally.  There are no palpable breast lumps.  Nipple discharge and axillary adenopathy are absent.  Abdominal:      General: There is no distension.      Palpations: Abdomen is soft.      Tenderness: There is no abdominal tenderness.   Genitourinary:     Labia:         Right: No lesion.         Left: No lesion.       Vagina: Normal. No vaginal discharge.      Cervix: No cervical motion tenderness.      Uterus: Normal. Not enlarged and not tender.       Adnexa:         Right: No mass or tenderness.          Left: No mass or tenderness.     Skin:     General: Skin is warm and dry.   Neurological:      Mental Status: She is alert and oriented to person, place, and time.         Assessment    Diagnoses and all orders for this visit:    1. Annual physical exam (Primary)    2. Premature menopause on HRT    Other orders  -     Progesterone (PROMETRIUM) 100 MG capsule; Take 1 capsule by mouth Daily.  Dispense: 30 capsule; Refill: 11  -     Estradiol Acetate 0.05 MG/24HR ring; Place 1 ring in  the vagina and change every third month  Dispense: 1 each; Refill: 3        Plan  Annual examination performed  Counseled regarding the importance of regular screening mammograms.  Mammogram was performed today.  The patient is current with colon cancer screening.  Hormone replacement.  Counseled and questions answered.  The patient would like to continue hormone replacement, but would like a different route of delivery than gel.  We discussed the benefits and risks of patches versus the vaginal ring.  The patient would like to try Femring.  She will continue her Prometrium.  Prescription has been sent.    Return in about 1 year (around 7/2/2025).    Social History     Tobacco Use   Smoking Status Never    Passive exposure: Never   Smokeless Tobacco Never

## 2024-07-05 LAB
CONV .: NORMAL
CYTOLOGIST CVX/VAG CYTO: NORMAL
CYTOLOGY CVX/VAG DOC CYTO: NORMAL
CYTOLOGY CVX/VAG DOC THIN PREP: NORMAL
DX ICD CODE: NORMAL
Lab: NORMAL
OTHER STN SPEC: NORMAL
STAT OF ADQ CVX/VAG CYTO-IMP: NORMAL

## 2024-11-27 RX ORDER — LEVOTHYROXINE SODIUM 25 UG/1
25 TABLET ORAL DAILY
Qty: 30 TABLET | Refills: 1 | Status: SHIPPED | OUTPATIENT
Start: 2024-11-27

## 2025-01-13 ENCOUNTER — TELEPHONE (OUTPATIENT)
Dept: FAMILY MEDICINE CLINIC | Facility: CLINIC | Age: 50
End: 2025-01-13
Payer: COMMERCIAL

## 2025-01-13 DIAGNOSIS — Z00.00 LABORATORY EXAMINATION ORDERED AS PART OF A ROUTINE GENERAL MEDICAL EXAMINATION: ICD-10-CM

## 2025-01-13 DIAGNOSIS — E55.9 VITAMIN D DEFICIENCY: ICD-10-CM

## 2025-01-13 DIAGNOSIS — E03.9 HYPOTHYROIDISM, ACQUIRED: Primary | ICD-10-CM

## 2025-01-13 NOTE — TELEPHONE ENCOUNTER
PT came in today to get labs done, advised PT no active labs. Sent message to ALMA Montes, I Was told provider Rosita was sent a message to have active labs placed. PT has [place] go to airport, advised to come in the morning at 8 am and I will accommodate her for lab appt.

## 2025-01-15 ENCOUNTER — PATIENT MESSAGE (OUTPATIENT)
Dept: FAMILY MEDICINE CLINIC | Facility: CLINIC | Age: 50
End: 2025-01-15
Payer: COMMERCIAL

## 2025-01-15 LAB
25(OH)D3+25(OH)D2 SERPL-MCNC: 69.7 NG/ML (ref 30–100)
ALBUMIN SERPL-MCNC: 4.4 G/DL (ref 3.5–5.2)
ALBUMIN/GLOB SERPL: 1.3 G/DL
ALP SERPL-CCNC: 76 U/L (ref 39–117)
ALT SERPL-CCNC: 19 U/L (ref 1–33)
APPEARANCE UR: CLEAR
AST SERPL-CCNC: 28 U/L (ref 1–32)
BACTERIA #/AREA URNS HPF: ABNORMAL /HPF
BASOPHILS # BLD AUTO: 0.05 10*3/MM3 (ref 0–0.2)
BASOPHILS NFR BLD AUTO: 0.8 % (ref 0–1.5)
BILIRUB SERPL-MCNC: 0.6 MG/DL (ref 0–1.2)
BILIRUB UR QL STRIP: NEGATIVE
BUN SERPL-MCNC: 9 MG/DL (ref 6–20)
BUN/CREAT SERPL: 13.2 (ref 7–25)
CALCIUM SERPL-MCNC: 9.9 MG/DL (ref 8.6–10.5)
CASTS URNS MICRO: ABNORMAL
CHLORIDE SERPL-SCNC: 100 MMOL/L (ref 98–107)
CHOLEST SERPL-MCNC: 335 MG/DL (ref 0–200)
CO2 SERPL-SCNC: 27.3 MMOL/L (ref 22–29)
COLOR UR: YELLOW
CREAT SERPL-MCNC: 0.68 MG/DL (ref 0.57–1)
EGFRCR SERPLBLD CKD-EPI 2021: 106.3 ML/MIN/1.73
EOSINOPHIL # BLD AUTO: 0.56 10*3/MM3 (ref 0–0.4)
EOSINOPHIL NFR BLD AUTO: 9.3 % (ref 0.3–6.2)
EPI CELLS #/AREA URNS HPF: ABNORMAL /HPF
ERYTHROCYTE [DISTWIDTH] IN BLOOD BY AUTOMATED COUNT: 13 % (ref 12.3–15.4)
FOLATE SERPL-MCNC: >20 NG/ML (ref 4.78–24.2)
GLOBULIN SER CALC-MCNC: 3.3 GM/DL
GLUCOSE SERPL-MCNC: 80 MG/DL (ref 65–99)
GLUCOSE UR QL STRIP: NEGATIVE
HBA1C MFR BLD: 5.5 % (ref 4.8–5.6)
HCT VFR BLD AUTO: 40.2 % (ref 34–46.6)
HDLC SERPL-MCNC: 153 MG/DL (ref 40–60)
HGB BLD-MCNC: 13.5 G/DL (ref 12–15.9)
HGB UR QL STRIP: NEGATIVE
IMM GRANULOCYTES # BLD AUTO: 0.01 10*3/MM3 (ref 0–0.05)
IMM GRANULOCYTES NFR BLD AUTO: 0.2 % (ref 0–0.5)
KETONES UR QL STRIP: NEGATIVE
LDLC SERPL CALC-MCNC: 176 MG/DL (ref 0–100)
LEUKOCYTE ESTERASE UR QL STRIP: ABNORMAL
LYMPHOCYTES # BLD AUTO: 1.83 10*3/MM3 (ref 0.7–3.1)
LYMPHOCYTES NFR BLD AUTO: 30.4 % (ref 19.6–45.3)
MAGNESIUM SERPL-MCNC: 2 MG/DL (ref 1.6–2.6)
MCH RBC QN AUTO: 29.5 PG (ref 26.6–33)
MCHC RBC AUTO-ENTMCNC: 33.6 G/DL (ref 31.5–35.7)
MCV RBC AUTO: 87.8 FL (ref 79–97)
MONOCYTES # BLD AUTO: 0.51 10*3/MM3 (ref 0.1–0.9)
MONOCYTES NFR BLD AUTO: 8.5 % (ref 5–12)
NEUTROPHILS # BLD AUTO: 3.05 10*3/MM3 (ref 1.7–7)
NEUTROPHILS NFR BLD AUTO: 50.8 % (ref 42.7–76)
NITRITE UR QL STRIP: NEGATIVE
NRBC BLD AUTO-RTO: 0 /100 WBC (ref 0–0.2)
PH UR STRIP: 7 [PH] (ref 5–8)
PLATELET # BLD AUTO: 259 10*3/MM3 (ref 140–450)
POTASSIUM SERPL-SCNC: 4.7 MMOL/L (ref 3.5–5.2)
PROT SERPL-MCNC: 7.7 G/DL (ref 6–8.5)
PROT UR QL STRIP: NEGATIVE
RBC # BLD AUTO: 4.58 10*6/MM3 (ref 3.77–5.28)
RBC #/AREA URNS HPF: ABNORMAL /HPF
SODIUM SERPL-SCNC: 140 MMOL/L (ref 136–145)
SP GR UR STRIP: 1.01 (ref 1–1.03)
T3FREE SERPL-MCNC: 3.6 PG/ML (ref 2–4.4)
T4 FREE SERPL-MCNC: 1.24 NG/DL (ref 0.92–1.68)
TRIGL SERPL-MCNC: 54 MG/DL (ref 0–150)
TSH SERPL DL<=0.005 MIU/L-ACNC: 1.79 UIU/ML (ref 0.27–4.2)
UROBILINOGEN UR STRIP-MCNC: ABNORMAL MG/DL
VIT B12 SERPL-MCNC: 286 PG/ML (ref 211–946)
VLDLC SERPL CALC-MCNC: 6 MG/DL (ref 5–40)
WBC # BLD AUTO: 6.01 10*3/MM3 (ref 3.4–10.8)
WBC #/AREA URNS HPF: ABNORMAL /HPF

## 2025-01-15 RX ORDER — LEVOTHYROXINE SODIUM 25 UG/1
25 TABLET ORAL DAILY
Qty: 90 TABLET | Refills: 3 | Status: SHIPPED | OUTPATIENT
Start: 2025-01-15

## 2025-01-15 RX ORDER — LEVOTHYROXINE SODIUM 25 UG/1
25 TABLET ORAL DAILY
Qty: 30 TABLET | Refills: 1 | Status: SHIPPED | OUTPATIENT
Start: 2025-01-15 | End: 2025-01-15 | Stop reason: SDUPTHER

## 2025-01-15 RX ORDER — ROSUVASTATIN CALCIUM 20 MG/1
20 TABLET, COATED ORAL DAILY
Qty: 90 TABLET | Refills: 1 | Status: SHIPPED | OUTPATIENT
Start: 2025-01-15

## 2025-01-15 RX ORDER — ROSUVASTATIN CALCIUM 20 MG/1
20 TABLET, COATED ORAL DAILY
Qty: 30 TABLET | Refills: 11 | Status: SHIPPED | OUTPATIENT
Start: 2025-01-15 | End: 2025-01-15 | Stop reason: SDUPTHER

## 2025-01-17 RX ORDER — PHENAZOPYRIDINE HYDROCHLORIDE 200 MG/1
200 TABLET, FILM COATED ORAL 3 TIMES DAILY PRN
Qty: 30 TABLET | Refills: 0 | Status: SHIPPED | OUTPATIENT
Start: 2025-01-17 | End: 2025-01-27

## 2025-01-17 RX ORDER — LEVOFLOXACIN 250 MG/1
250 TABLET, FILM COATED ORAL DAILY
Qty: 3 TABLET | Refills: 0 | Status: SHIPPED | OUTPATIENT
Start: 2025-01-17 | End: 2025-01-20

## 2025-02-10 ENCOUNTER — TELEPHONE (OUTPATIENT)
Dept: OBSTETRICS AND GYNECOLOGY | Facility: CLINIC | Age: 50
End: 2025-02-10
Payer: COMMERCIAL

## 2025-05-04 ENCOUNTER — APPOINTMENT (OUTPATIENT)
Dept: ULTRASOUND IMAGING | Facility: HOSPITAL | Age: 50
End: 2025-05-04
Payer: COMMERCIAL

## 2025-05-04 ENCOUNTER — HOSPITAL ENCOUNTER (EMERGENCY)
Facility: HOSPITAL | Age: 50
Discharge: HOME | End: 2025-05-04
Attending: EMERGENCY MEDICINE | Admitting: EMERGENCY MEDICINE
Payer: COMMERCIAL

## 2025-05-04 ENCOUNTER — APPOINTMENT (OUTPATIENT)
Dept: CT IMAGING | Facility: HOSPITAL | Age: 50
End: 2025-05-04
Payer: COMMERCIAL

## 2025-05-04 VITALS
DIASTOLIC BLOOD PRESSURE: 72 MMHG | TEMPERATURE: 98.1 F | RESPIRATION RATE: 18 BRPM | HEIGHT: 69 IN | OXYGEN SATURATION: 96 % | WEIGHT: 167 LBS | BODY MASS INDEX: 24.73 KG/M2 | SYSTOLIC BLOOD PRESSURE: 117 MMHG | HEART RATE: 96 BPM

## 2025-05-04 DIAGNOSIS — N81.9 FEMALE GENITAL PROLAPSE, UNSPECIFIED TYPE: ICD-10-CM

## 2025-05-04 DIAGNOSIS — N93.9 ABNORMAL UTERINE BLEEDING: Primary | ICD-10-CM

## 2025-05-04 LAB
ALBUMIN SERPL-MCNC: 4.8 G/DL (ref 3.5–5.2)
ALBUMIN/GLOB SERPL: 1.8 G/DL
ALP SERPL-CCNC: 81 U/L (ref 39–117)
ALT SERPL W P-5'-P-CCNC: 15 U/L (ref 1–33)
ANION GAP SERPL CALCULATED.3IONS-SCNC: 11 MMOL/L (ref 5–15)
AST SERPL-CCNC: 22 U/L (ref 1–32)
BACTERIA UR QL AUTO: NORMAL /HPF
BASOPHILS # BLD AUTO: 0.03 10*3/MM3 (ref 0–0.2)
BASOPHILS NFR BLD AUTO: 0.6 % (ref 0–1.5)
BILIRUB SERPL-MCNC: 0.2 MG/DL (ref 0–1.2)
BILIRUB UR QL STRIP: NEGATIVE
BUN SERPL-MCNC: 8 MG/DL (ref 6–20)
BUN/CREAT SERPL: 11.3 (ref 7–25)
CALCIUM SPEC-SCNC: 9.3 MG/DL (ref 8.6–10.5)
CHLORIDE SERPL-SCNC: 104 MMOL/L (ref 98–107)
CLARITY UR: CLEAR
CO2 SERPL-SCNC: 26 MMOL/L (ref 22–29)
COLOR UR: YELLOW
CREAT SERPL-MCNC: 0.71 MG/DL (ref 0.57–1)
D-LACTATE SERPL-SCNC: 1.1 MMOL/L (ref 0.5–2)
DEPRECATED RDW RBC AUTO: 41.8 FL (ref 37–54)
EGFRCR SERPLBLD CKD-EPI 2021: 103.7 ML/MIN/1.73
EOSINOPHIL # BLD AUTO: 0.33 10*3/MM3 (ref 0–0.4)
EOSINOPHIL NFR BLD AUTO: 6.5 % (ref 0.3–6.2)
ERYTHROCYTE [DISTWIDTH] IN BLOOD BY AUTOMATED COUNT: 12.8 % (ref 12.3–15.4)
GLOBULIN UR ELPH-MCNC: 2.6 GM/DL
GLUCOSE SERPL-MCNC: 84 MG/DL (ref 65–99)
GLUCOSE UR STRIP-MCNC: NEGATIVE MG/DL
HCG SERPL QL: NEGATIVE
HCT VFR BLD AUTO: 39.2 % (ref 34–46.6)
HGB BLD-MCNC: 13.1 G/DL (ref 12–15.9)
HGB UR QL STRIP.AUTO: ABNORMAL
HOLD SPECIMEN: NORMAL
HYALINE CASTS UR QL AUTO: NORMAL /LPF
IMM GRANULOCYTES # BLD AUTO: 0.01 10*3/MM3 (ref 0–0.05)
IMM GRANULOCYTES NFR BLD AUTO: 0.2 % (ref 0–0.5)
KETONES UR QL STRIP: NEGATIVE
LEUKOCYTE ESTERASE UR QL STRIP.AUTO: NEGATIVE
LIPASE SERPL-CCNC: 39 U/L (ref 13–60)
LYMPHOCYTES # BLD AUTO: 2.02 10*3/MM3 (ref 0.7–3.1)
LYMPHOCYTES NFR BLD AUTO: 39.7 % (ref 19.6–45.3)
MCH RBC QN AUTO: 30 PG (ref 26.6–33)
MCHC RBC AUTO-ENTMCNC: 33.4 G/DL (ref 31.5–35.7)
MCV RBC AUTO: 89.9 FL (ref 79–97)
MONOCYTES # BLD AUTO: 0.52 10*3/MM3 (ref 0.1–0.9)
MONOCYTES NFR BLD AUTO: 10.2 % (ref 5–12)
NEUTROPHILS NFR BLD AUTO: 2.18 10*3/MM3 (ref 1.7–7)
NEUTROPHILS NFR BLD AUTO: 42.8 % (ref 42.7–76)
NITRITE UR QL STRIP: NEGATIVE
NRBC BLD AUTO-RTO: 0 /100 WBC (ref 0–0.2)
PH UR STRIP.AUTO: 7 [PH] (ref 5–8)
PLATELET # BLD AUTO: 216 10*3/MM3 (ref 140–450)
PMV BLD AUTO: 8.4 FL (ref 6–12)
POTASSIUM SERPL-SCNC: 3.8 MMOL/L (ref 3.5–5.2)
PROT SERPL-MCNC: 7.4 G/DL (ref 6–8.5)
PROT UR QL STRIP: NEGATIVE
RBC # BLD AUTO: 4.36 10*6/MM3 (ref 3.77–5.28)
RBC # UR STRIP: NORMAL /HPF
REF LAB TEST METHOD: NORMAL
SODIUM SERPL-SCNC: 141 MMOL/L (ref 136–145)
SP GR UR STRIP: <1.005 (ref 1–1.03)
SQUAMOUS #/AREA URNS HPF: NORMAL /HPF
UROBILINOGEN UR QL STRIP: ABNORMAL
WBC # UR STRIP: NORMAL /HPF
WBC NRBC COR # BLD AUTO: 5.09 10*3/MM3 (ref 3.4–10.8)
WHOLE BLOOD HOLD COAG: NORMAL
WHOLE BLOOD HOLD SPECIMEN: NORMAL

## 2025-05-04 PROCEDURE — 76830 TRANSVAGINAL US NON-OB: CPT

## 2025-05-04 PROCEDURE — 80053 COMPREHEN METABOLIC PANEL: CPT | Performed by: EMERGENCY MEDICINE

## 2025-05-04 PROCEDURE — 84703 CHORIONIC GONADOTROPIN ASSAY: CPT | Performed by: EMERGENCY MEDICINE

## 2025-05-04 PROCEDURE — 83690 ASSAY OF LIPASE: CPT | Performed by: EMERGENCY MEDICINE

## 2025-05-04 PROCEDURE — 81001 URINALYSIS AUTO W/SCOPE: CPT | Performed by: EMERGENCY MEDICINE

## 2025-05-04 PROCEDURE — 76856 US EXAM PELVIC COMPLETE: CPT

## 2025-05-04 PROCEDURE — 83605 ASSAY OF LACTIC ACID: CPT | Performed by: EMERGENCY MEDICINE

## 2025-05-04 PROCEDURE — 25510000001 IOPAMIDOL 61 % SOLUTION: Performed by: EMERGENCY MEDICINE

## 2025-05-04 PROCEDURE — 93976 VASCULAR STUDY: CPT

## 2025-05-04 PROCEDURE — 85025 COMPLETE CBC W/AUTO DIFF WBC: CPT | Performed by: EMERGENCY MEDICINE

## 2025-05-04 PROCEDURE — 99285 EMERGENCY DEPT VISIT HI MDM: CPT

## 2025-05-04 PROCEDURE — 74177 CT ABD & PELVIS W/CONTRAST: CPT

## 2025-05-04 RX ORDER — SODIUM CHLORIDE 0.9 % (FLUSH) 0.9 %
10 SYRINGE (ML) INJECTION AS NEEDED
Status: DISCONTINUED | OUTPATIENT
Start: 2025-05-04 | End: 2025-05-04 | Stop reason: HOSPADM

## 2025-05-04 RX ORDER — IOPAMIDOL 612 MG/ML
85 INJECTION, SOLUTION INTRAVASCULAR
Status: COMPLETED | OUTPATIENT
Start: 2025-05-04 | End: 2025-05-04

## 2025-05-04 RX ADMIN — IOPAMIDOL 85 ML: 612 INJECTION, SOLUTION INTRAVENOUS at 20:33

## 2025-05-04 NOTE — Clinical Note
[facility] EMERGENCY DEPARTMENT  [address] [facility] [address]  Phone: [phone]    Mary Alves was seen and treated in our emergency department on 5/4/2025.  She may return to work on 05/07/2025.         Thank you for choosing [facility].    Keny Busby MD

## 2025-05-04 NOTE — ED PROVIDER NOTES
EMERGENCY DEPARTMENT ENCOUNTER  Room Number:  15/15  PCP: Rosita Scott PA-C  Independent Historians: Patient      HPI:  Chief Complaint: had concerns including Abdominal Pain and Vaginal Bleeding.     A complete HPI/ROS/PMH/PSH/SH/FH are unobtainable due to: Nothing      Context: Mary Alves is a 49 y.o. female with a medical history of ovarian cysts who presents to the ED c/o acute lower abdominal discomfort and vaginal bleeding.  She reports symptoms onset last night and worsened this morning.  She has had some blood when she wipes after urinating but she believes that the blood is coming from her vagina.  She has been wearing a panty liner which she has changed multiple times but has not been soaking it.  She did pass a few small clots.  She reports suprapubic discomfort and also some pain in her low back.  She has not had vaginal bleeding in years.  She does use an estrogen cream as well as progesterone.  She states that she tried to look with a mirror to see where the bleeding was coming from and she appeared to have some organ prolapse for her vagina earlier which she thinks was her uterus.  She pushed it back in and had some more bleeding.  She denies any stool from her vagina.  She has had no fever or chills.  She denies history of diverticulitis.  She denies history of kidney stones however her son does have a history kidney stones.  She has had 1 prior vaginal delivery and had a subsequent twin pregnancy delivered by  section.  She denies having had intercourse prior to onset of bleeding.      Review of prior external notes (non-ED) -and- Review of prior external test results outside of this encounter: See ED course below        PAST MEDICAL HISTORY  Active Ambulatory Problems     Diagnosis Date Noted    Abdominal pain 2022    Abnormal CT of the abdomen 2022    Hypothyroidism, acquired 2022    Subcutaneous mass of neck 2022     Resolved Ambulatory Problems      Diagnosis Date Noted    No Resolved Ambulatory Problems     Past Medical History:   Diagnosis Date    Abnormal ECG 2021    Asthma     Breast mass     Colon polyp 2021    Hypothyroidism     Lipoma     Ovarian cyst     PONV (postoperative nausea and vomiting)     Slow to wake up after anesthesia          PAST SURGICAL HISTORY  Past Surgical History:   Procedure Laterality Date    AUGMENTATION MAMMAPLASTY      BLADDER REPAIR      BLADDER SLING    BREAST AUGMENTATION  2014    Rupture implant 2020 revision done    BREAST BIOPSY       SECTION      Twins    COLONOSCOPY      ELBOW PROCEDURE Right 2022    ENDOSCOPY N/A 2022    Procedure: ESOPHAGOGASTRODUODENOSCOPY WITH BIOPSY;  Surgeon: Dandre Varela MD;  Location: [facility] MAIN OR;  Service: Gastroenterology;  Laterality: N/A;  MILD GASTRITIS    EXCISION MASS HEAD/NECK N/A 2022    Procedure: Excision anterior neck lipoma;  Surgeon: Jolanta Villalpando MD;  Location: [facility] MAIN OR;  Service: General;  Laterality: N/A;    FRACTURE SURGERY      Right elbow    KNEE SURGERY Right     OOPHORECTOMY           FAMILY HISTORY  Family History   Problem Relation Age of Onset    Colon polyps Mother     Heart attack Mother     Colon cancer Mother     Arthritis Mother     Depression Mother     Heart disease Mother          from anterior lateral MI    Brain cancer Father     Heart attack Father     Crohn's disease Neg Hx     Irritable bowel syndrome Neg Hx     Ulcerative colitis Neg Hx     Malig Hyperthermia Neg Hx          SOCIAL HISTORY  Social History     Socioeconomic History    Marital status:    Tobacco Use    Smoking status: Never     Passive exposure: Never    Smokeless tobacco: Never   Vaping Use    Vaping status: Never Used   Substance and Sexual Activity    Alcohol use: Yes     Comment: occasionally    Drug use: No    Sexual activity: Yes     Partners: Male     Birth control/protection: Condom, None         ALLERGIES  Patient has  no known allergies.      REVIEW OF SYSTEMS  Review of all 14 systems is negative other than stated in the HPI above.      PHYSICAL EXAM    I have reviewed the triage vital signs and nursing notes.    ED Triage Vitals   Temp Heart Rate Resp BP SpO2   05/04/25 1809 05/04/25 1809 05/04/25 1809 05/04/25 1814 05/04/25 1809   97.1 °F (36.2 °C) 97 18 162/86 97 %      Temp src Heart Rate Source Patient Position BP Location FiO2 (%)   -- -- -- -- --                GENERAL: awake and alert, well-appearing, no acute distress  HENT: Normocephalic, atraumatic  EYES: no scleral icterus, pupils 3 mm reactive bilaterally  CV: regular rhythm, regular rate  RESPIRATORY: normal effort  ABDOMEN: soft, mild suprapubic tenderness with some endorsement of rebound tenderness, abdomen nondistended  : Exam chaperoned by [facility] ED tech.  Normal-appearing external genitalia.  On speculum examination the vaginal wall appears intact without evidence of vaginal wall laceration or pelvic organ prolapse into the vagina.  The cervix appears normal with trace dark blood from the cervical os.   MUSCULOSKELETAL: no deformity  NEURO: alert, moves all extremities, follows commands  PSYCH: calm, cooperative  SKIN: Warm, dry          LAB RESULTS  Recent Results (from the past 24 hours)   Comprehensive Metabolic Panel    Collection Time: 05/04/25  6:27 PM    Specimen: Blood   Result Value Ref Range    Glucose 84 65 - 99 mg/dL    BUN 8 6 - 20 mg/dL    Creatinine 0.71 0.57 - 1.00 mg/dL    Sodium 141 136 - 145 mmol/L    Potassium 3.8 3.5 - 5.2 mmol/L    Chloride 104 98 - 107 mmol/L    CO2 26.0 22.0 - 29.0 mmol/L    Calcium 9.3 8.6 - 10.5 mg/dL    Total Protein 7.4 6.0 - 8.5 g/dL    Albumin 4.8 3.5 - 5.2 g/dL    ALT (SGPT) 15 1 - 33 U/L    AST (SGOT) 22 1 - 32 U/L    Alkaline Phosphatase 81 39 - 117 U/L    Total Bilirubin 0.2 0.0 - 1.2 mg/dL    Globulin 2.6 gm/dL    A/G Ratio 1.8 g/dL    BUN/Creatinine Ratio 11.3 7.0 - 25.0    Anion Gap 11.0 5.0 - 15.0  mmol/L    eGFR 103.7 >60.0 mL/min/1.73   Lipase    Collection Time: 05/04/25  6:27 PM    Specimen: Blood   Result Value Ref Range    Lipase 39 13 - 60 U/L   Lactic Acid, Plasma    Collection Time: 05/04/25  6:27 PM    Specimen: Blood   Result Value Ref Range    Lactate 1.1 0.5 - 2.0 mmol/L   hCG, Serum, Qualitative    Collection Time: 05/04/25  6:27 PM    Specimen: Blood   Result Value Ref Range    HCG Qualitative Negative Negative   Green Top (Gel)    Collection Time: 05/04/25  6:27 PM   Result Value Ref Range    Extra Tube Hold for add-ons.    Lavender Top    Collection Time: 05/04/25  6:27 PM   Result Value Ref Range    Extra Tube hold for add-on    Light Blue Top    Collection Time: 05/04/25  6:27 PM   Result Value Ref Range    Extra Tube Hold for add-ons.    CBC Auto Differential    Collection Time: 05/04/25  6:27 PM    Specimen: Blood   Result Value Ref Range    WBC 5.09 3.40 - 10.80 10*3/mm3    RBC 4.36 3.77 - 5.28 10*6/mm3    Hemoglobin 13.1 12.0 - 15.9 g/dL    Hematocrit 39.2 34.0 - 46.6 %    MCV 89.9 79.0 - 97.0 fL    MCH 30.0 26.6 - 33.0 pg    MCHC 33.4 31.5 - 35.7 g/dL    RDW 12.8 12.3 - 15.4 %    RDW-SD 41.8 37.0 - 54.0 fl    MPV 8.4 6.0 - 12.0 fL    Platelets 216 140 - 450 10*3/mm3    Neutrophil % 42.8 42.7 - 76.0 %    Lymphocyte % 39.7 19.6 - 45.3 %    Monocyte % 10.2 5.0 - 12.0 %    Eosinophil % 6.5 (H) 0.3 - 6.2 %    Basophil % 0.6 0.0 - 1.5 %    Immature Grans % 0.2 0.0 - 0.5 %    Neutrophils, Absolute 2.18 1.70 - 7.00 10*3/mm3    Lymphocytes, Absolute 2.02 0.70 - 3.10 10*3/mm3    Monocytes, Absolute 0.52 0.10 - 0.90 10*3/mm3    Eosinophils, Absolute 0.33 0.00 - 0.40 10*3/mm3    Basophils, Absolute 0.03 0.00 - 0.20 10*3/mm3    Immature Grans, Absolute 0.01 0.00 - 0.05 10*3/mm3    nRBC 0.0 0.0 - 0.2 /100 WBC   Urinalysis With Microscopic If Indicated (No Culture) - Urine, Clean Catch    Collection Time: 05/04/25  6:50 PM    Specimen: Urine, Clean Catch   Result Value Ref Range    Color, UA Yellow  Yellow, Straw    Appearance, UA Clear Clear    pH, UA 7.0 5.0 - 8.0    Specific Gravity, UA <1.005 (L) 1.005 - 1.030    Glucose, UA Negative Negative    Ketones, UA Negative Negative    Bilirubin, UA Negative Negative    Blood, UA Large (3+) (A) Negative    Protein, UA Negative Negative    Leuk Esterase, UA Negative Negative    Nitrite, UA Negative Negative    Urobilinogen, UA 0.2 E.U./dL 0.2 - 1.0 E.U./dL   Urinalysis, Microscopic Only - Urine, Clean Catch    Collection Time: 05/04/25  6:50 PM    Specimen: Urine, Clean Catch   Result Value Ref Range    RBC, UA 0-2 None Seen, 0-2 /HPF    WBC, UA 0-2 None Seen, 0-2 /HPF    Bacteria, UA None Seen None Seen /HPF    Squamous Epithelial Cells, UA 0-2 None Seen, 0-2 /HPF    Hyaline Casts, UA None Seen None Seen /LPF    Methodology Automated Microscopy        The above labs were ordered by me and independently reviewed by me.     RADIOLOGY  CT Abdomen Pelvis With Contrast  Result Date: 5/4/2025  CT  ABDOMEN & PELVIS WITH IV CONTRAST  HISTORY: lower abd pain, vaginal bleeding  TECHNIQUE: CT of the abdomen and pelvis with  IV contrast. Coronal and sagittal reconstructions were obtained.  Radiation dose reduction techniques were utilized, including automated exposure control, and exposure modulation based on body size.  COMPARISON: None available.  FINDINGS:  Lung bases: Visualized lung bases are unremarkable.  Abdomen: The liver and gallbladder are normal.  The spleen and pancreas appear normal.  Both adrenal glands are normal.  Both kidneys are normal.  The aorta is normal in caliber.   There is no abdominal or retroperitoneal adenopathy or other mass. There is no abdominal or retroperitoneal inflammatory change, or abnormal fluid or air collection.  There is no evidence of bowel obstruction.   Pelvis:  The appendix is clearly identified, and is normal.  There is no pelvic inflammatory change or other abnormal fluid collection.  There is no pelvic adenopathy or other soft  tissue mass.  There is no CT evidence of hernia or bowel obstruction.  The bony structures are normal.       Negative CT of the abdomen and pelvis. No acute abnormality.     This report was finalized on 5/4/2025 9:48 PM by Dr. Sushant Frausto M.D on Workstation: [id number]      US Pelvis Complete  Result Date: 5/4/2025  PELVIC ULTRASOUND  COMPARISON: None.  HISTORY: pelvic pain, vaginal bleeding  TECHNIQUE: Transabdominal and transvaginal sonographic imaging was used to evaluate the female pelvic structures in both transverse and longitudinal planes. Insufficient anatomic detail on transabdominal imaging necessitated transvaginal imaging.   Duplex scan was performed using B-Mode/gray scale, and color Doppler imaging of the ovaries.  FINDINGS: The uterus measures 11.6 cm in sagittal dimension, 4.2 cm in AP dimension, and 5.1 cm in transverse dimension. The endometrial bilayer measures 5 mm in greatest thickness. The uterine myometrium is homogenous in echotexture.  The right ovary measures 2.4 x 1.1 x 1.9 cm. There are multiple immature follicles.   There is normal ovarian parenchymal vascularity on color Doppler imaging.  The left ovary measures 2.8 x 1.6 x 1.8 cm. There is a 1.8 cm simple cyst. There is normal ovarian parenchymal vascularity on color Doppler imaging.  There is no free fluid within the cul-de-sac. There is a large cervical nabothian cyst, 1.8 cm in maximal dimension.      No acute abnormality within the pelvis.  This report was finalized on 5/4/2025 8:41 PM by Dr. Sushant Frausto M.D on Workstation: [id number]        The above radiology studies were ordered by me.  See ED course for independent interpretations.     MEDICATIONS GIVEN IN ER  Medications   sodium chloride 0.9 % flush 10 mL (has no administration in time range)   sodium chloride 0.9 % flush 10 mL (has no administration in time range)   iopamidol (ISOVUE-300) 61 % injection 85 mL (85 mL Intravenous Given 5/4/25 2033)         ORDERS  PLACED DURING THIS VISIT:  Orders Placed This Encounter   Procedures    US Non-ob Transvaginal    US Pelvis Complete    US Testicular or Ovarian Vascular Limited    CT Abdomen Pelvis With Contrast    Gobles Draw    Comprehensive Metabolic Panel    Lipase    Lactic Acid, Plasma    Urinalysis With Microscopic If Indicated (No Culture) - Urine, Clean Catch    hCG, Serum, Qualitative    CBC Auto Differential    Urinalysis, Microscopic Only - Urine, Clean Catch    Ambulatory Referral to Obstetrics / Gynecology (All except Cor)    NPO Diet NPO Type: Strict NPO    Undress & Gown    Insert Peripheral IV    Insert Peripheral IV    CBC & Differential    Green Top (Gel)    Lavender Top    Light Blue Top         OUTPATIENT MEDICATION MANAGEMENT:  Current Facility-Administered Medications Ordered in Epic   Medication Dose Route Frequency Provider Last Rate Last Admin    sodium chloride 0.9 % flush 10 mL  10 mL Intravenous PRN Keny Busby MD        sodium chloride 0.9 % flush 10 mL  10 mL Intravenous PRN Keny Busby MD         Current Outpatient Medications Ordered in Epic   Medication Sig Dispense Refill    desvenlafaxine (Pristiq) 50 MG 24 hr tablet Take 1 tablet by mouth Daily. For anxiety 90 tablet 3    Estradiol Acetate 0.05 MG/24HR ring Place 1 ring in the vagina and change every third month 1 each 3    Estradiol Acetate 0.05 MG/24HR ring Place 1 ring into the vagina and change every third month. 1 each 0    famotidine (PEPCID) 20 MG tablet Take 1 tablet by mouth Daily.      ibuprofen (ADVIL,MOTRIN) 800 MG tablet Take 1 tablet by mouth Every 8 (Eight) Hours As Needed. for pain    HOLD FOR SURGERY      levothyroxine (SYNTHROID, LEVOTHROID) 25 MCG tablet Take 1 tablet by mouth Daily before breakfast for thyroid 90 tablet 3    Multiple Vitamins-Minerals (MULTIVITAMIN ADULT PO) Take 1 tablet by mouth Daily. HOLD FOR SURGERY      naproxen (Naprosyn) 500 MG tablet Take 1 tablet by mouth 2 (Two) Times a Day  With Meals. 30 tablet 0    nitrofurantoin, macrocrystal-monohydrate, (MACROBID) 100 MG capsule Take 1 capsule by mouth Every 12 (Twelve) Hours. For UTI 14 capsule 5    OMEGA-3 FATTY ACIDS PO Take 1 capsule by mouth Daily. HOLD FOR SURGERY      ondansetron ODT (ZOFRAN-ODT) 8 MG disintegrating tablet Place 1 tablet on the tongue Every 8 (Eight) Hours As Needed for Nausea or Vomiting. 10 tablet 0    Progesterone (PROMETRIUM) 100 MG capsule Take 1 capsule by mouth Daily. 30 capsule 11    rosuvastatin (Crestor) 20 MG tablet Take 1 tablet by mouth Daily for cholesterol 90 tablet 1         PROCEDURES  Procedures            PROGRESS, DATA ANALYSIS, CONSULTS, AND MEDICAL DECISION MAKING  All labs have been independently interpreted by me.  All radiology studies have been reviewed by me. All EKG's have been independently viewed and interpreted by me.  Discussion below represents my analysis of pertinent findings related to patient's condition, differential diagnosis, treatment plan and final disposition.    Differential diagnosis includes but is not limited to:  Pelvic organ prolapse  Endometrial cancer  Hemorrhagic cystitis  Ureteral calculus  Renal mass  Cystocele  Rectocele      Clinical Scores:                  ED Course as of 05/04/25 2206   Sun May 04, 2025   1824 I reviewed prior OB/GYN visit from 7/2/2024.  Patient was there for routine gynecologic exam [JR]   1914 HCG Qualitative: Negative [JR]   1950 WBC, UA: 0-2 [JR]   2022 Pelvic ultrasound independently interpreted in PACS and demonstrates no obvious uterine fibroid, no endometrial thickening.  I have ordered CT abdomen pelvis for further evaluation of her suprapubic tenderness. [JR]   2144 Postvoid bladder scan 39 cc. [JR]      ED Course User Index  [JR] Keny Busby MD             AS OF 22:06 EDT VITALS:    BP - 117/72  HR - 96  TEMP - 98.1 °F (36.7 °C) (Oral)  O2 SATS - 96%    COMPLEXITY OF CARE        Chronic or social conditions impacting patient  care (Social Determinants of Health):     DIAGNOSIS  Final diagnoses:   Abnormal uterine bleeding   Female genital prolapse, unspecified type           DISPOSITION  DISCHARGE    Patient discharged in stable condition.    Reviewed implications of results, diagnosis, meds, responsibility to follow up, warning signs and symptoms of possible worsening, potential complications and reasons to return to ER.    Patient/Family voiced understanding of above instructions.    Discussed plan for discharge, as there is no emergent indication for admission. Patient referred to primary care provider for BP management due to today's BP. Pt/family is agreeable and understands need for follow up and repeat testing.  Pt is aware that discharge does not mean that nothing is wrong but it indicates no emergency is present that requires admission and they must continue care with follow-up as given below or physician of their choice.     FOLLOW-UP  Chetan Kim MD  [address] [place] [address]  [phone]    Schedule an appointment as soon as possible for a visit            Medication List      No changes were made to your prescriptions during this visit.             Prescription drug monitoring program review:           Please note that portions of this document were completed with a voice recognition program.    Note Disclaimer: At [facility], we believe that sharing information builds trust and better relationships. You are receiving this note because you recently visited [facility]. It is possible you will see health information before a provider has talked with you about it. This kind of information can be easy to misunderstand. To help you fully understand what it means for your health, we urge you to discuss this note with your provider.         Keny Busby MD  05/04/25 0110

## 2025-05-05 ENCOUNTER — TELEPHONE (OUTPATIENT)
Dept: OBSTETRICS AND GYNECOLOGY | Facility: CLINIC | Age: 50
End: 2025-05-05

## 2025-05-05 NOTE — TELEPHONE ENCOUNTER
"Caller: Mary Alves \"Ara\"    Relationship to patient: Self    Best call back number: [phone]    Patient is needing: PATIENT CALLED AND STATED THAT THE [facility] SHARED PHARMACY SERVICES ADVISED THE PATIENT THAT THE PRESCRIPTION FOR ESTRADIOL ACETATE 0.05 MG / 24 HR RING WOULD REQUIRE A PRIOR AUTHORIZATION WHICH THEY TOLD HER THEY SENT TO THE OFFICE BUT THEY HAVEN'T RECEIVED ANYTHING BACK YET AND PATIENT WOULD LIKE TO CHECK ON THE STATUS OF THAT   "

## 2025-05-11 NOTE — PROGRESS NOTES
GYN VISIT    Chief Complaint   Patient presents with    Follow-up     Here today for abnormal bleeding.Hasn't had a period for three years.        CRISTIANO Hernandez is a 49 y.o.  who presents with postmenopausal bleeding.  She reports that her last period was at minimum 3 years ago.  She reports that she started to feel some cramping and then proceeded to have a menses that lasted for 5 days.  She was concerned about this and did go to the emergency department where imaging was performed.  Ultrasound showed an endometrial bilayer of 5 mm in its greatest thickness, a 1.8 simple cyst on the left ovary, and multiple immature follicles on the right.  She also notes that prior to her visit to the emergency department she felt as if a small ball was coming out at her introitus.  So she took her hand and push it back inside.  She had never had this happen before.  She does have a bladder sling that she has had for possibly around 15 years.         LMP: No LMP recorded. Patient is premenarcheal.    Past Medical History:   Diagnosis Date    Abnormal ECG 2021    Right bundle branch block    Asthma     Breast mass     Benign tumor removed    Colon polyp 2021    4 benign polyps removed    Hypothyroidism     Lipoma     ANTERIOR NECK    Ovarian cyst     PONV (postoperative nausea and vomiting)     Slow to wake up after anesthesia         Past Surgical History:   Procedure Laterality Date    AUGMENTATION MAMMAPLASTY      BLADDER REPAIR      BLADDER SLING    BREAST AUGMENTATION      Rupture implant  revision done    BREAST BIOPSY       SECTION      Twins    COLONOSCOPY      ELBOW PROCEDURE Right 2022    ENDOSCOPY N/A 2022    Procedure: ESOPHAGOGASTRODUODENOSCOPY WITH BIOPSY;  Surgeon: Dandre Varela MD;  Location: [facility] [place] MAIN OR;  Service: Gastroenterology;  Laterality: N/A;  MILD GASTRITIS    EXCISION MASS HEAD/NECK N/A 2022    Procedure: Excision anterior neck lipoma;  Surgeon:  "Jolanta Villalpando MD;  Location: [facility] OR;  Service: General;  Laterality: N/A;    FRACTURE SURGERY  1986    Right elbow    KNEE SURGERY Right     OOPHORECTOMY          Review of Systems   Constitutional:  Negative for chills, diaphoresis, fatigue and fever.   Genitourinary:  Positive for pelvic pain and vaginal bleeding. Negative for decreased urine volume, difficulty urinating, dyspareunia, flank pain, frequency, genital sores, hematuria, menstrual problem, urgency, urinary incontinence, vaginal discharge and vaginal pain.   Hematological:  Negative for adenopathy.   All other systems reviewed and are negative.      OBJECTIVE     Vitals:    05/12/25 1121   BP: 120/74   Weight: 78 kg (172 lb)   Height: 175.3 cm (69.02\")        Physical Exam  Constitutional:       General: She is awake.      Appearance: Normal appearance. She is well-developed and well-groomed.   HENT:      Head: Normocephalic and atraumatic.   Pulmonary:      Effort: Pulmonary effort is normal.   Musculoskeletal:      Cervical back: Normal range of motion.   Neurological:      General: No focal deficit present.      Mental Status: She is alert and oriented to person, place, and time.   Skin:     General: Skin is warm and dry.   Psychiatric:         Mood and Affect: Mood normal.         Behavior: Behavior normal. Behavior is cooperative.   Vitals reviewed.         ASSESSMENT/PLAN    Diagnoses and all orders for this visit:    1. Abnormal uterine bleeding (AUB) (Primary)    2. Postmenopausal bleeding  -     POC Urinalysis Dipstick  -     NuSwab VG+ - Swab, Vagina  -     Genital Mycoplasmas NIC, Swab - Swab, Cervix  -     IGP, Apt HPV,rfx 16 / 18,45  -     US Non-ob Transvaginal; Future  -     Estradiol  -     Estrogens, Total  -     FSH & LH  -     TSH  -     T4, free  -     Prolactin  -     Testosterone  -     Inhibin A, Ultrasensitive  -     Antimullerian Hormone (AMH)  -     Hemoglobin & Hematocrit, Blood  -     Ferritin  -     Folate  -   "   Iron Profile      Pap and vaginal swabs sent for culture. Will notify patient of results when available and treat if indicated.   She will return to care within 2 weeks for ultrasound and office visit to follow.  Labs also ordered today.  Will notify patient of results and treat if indicated.    Return in about 2 weeks (around 5/26/2025) for ultrasound and office visit.    I spent 30 minutes caring for Mary on this date of service. This time includes time spent by me in the following activities: preparing for the visit, reviewing tests, performing a medically appropriate examination and/or evaluation, counseling and educating the patient/family/caregiver, referring and communicating with other health care professionals, documenting information in the medical record, independently interpreting results and communicating that information with the patient/family/caregiver, care coordination, ordering medications, ordering test(s), ordering procedure(s), obtaining a separately obtained history, and reviewing a separately obtained history.    Sunshine Padilla CNM  5/12/2025  14:32 EDT

## 2025-05-12 ENCOUNTER — OFFICE VISIT (OUTPATIENT)
Dept: OBSTETRICS AND GYNECOLOGY | Facility: CLINIC | Age: 50
End: 2025-05-12
Payer: COMMERCIAL

## 2025-05-12 VITALS
DIASTOLIC BLOOD PRESSURE: 74 MMHG | WEIGHT: 172 LBS | HEIGHT: 69 IN | BODY MASS INDEX: 25.48 KG/M2 | SYSTOLIC BLOOD PRESSURE: 120 MMHG

## 2025-05-12 DIAGNOSIS — N93.9 ABNORMAL UTERINE BLEEDING (AUB): Primary | ICD-10-CM

## 2025-05-12 DIAGNOSIS — N95.0 POSTMENOPAUSAL BLEEDING: ICD-10-CM

## 2025-05-12 LAB
BILIRUB BLD-MCNC: NEGATIVE MG/DL
GLUCOSE UR STRIP-MCNC: NEGATIVE MG/DL
KETONES UR QL: NEGATIVE
LEUKOCYTE EST, POC: ABNORMAL
NITRITE UR-MCNC: NEGATIVE MG/ML
PROT UR STRIP-MCNC: NEGATIVE MG/DL
RBC # UR STRIP: NEGATIVE /UL

## 2025-05-14 ENCOUNTER — RESULTS FOLLOW-UP (OUTPATIENT)
Dept: OBSTETRICS AND GYNECOLOGY | Facility: CLINIC | Age: 50
End: 2025-05-14
Payer: COMMERCIAL

## 2025-05-14 LAB
A VAGINAE DNA VAG QL NAA+PROBE: ABNORMAL SCORE
BVAB2 DNA VAG QL NAA+PROBE: ABNORMAL SCORE
C ALBICANS DNA VAG QL NAA+PROBE: NEGATIVE
C GLABRATA DNA VAG QL NAA+PROBE: NEGATIVE
C TRACH DNA SPEC QL NAA+PROBE: NEGATIVE
CYTOLOGIST CVX/VAG CYTO: NORMAL
CYTOLOGY CVX/VAG DOC CYTO: NORMAL
CYTOLOGY CVX/VAG DOC THIN PREP: NORMAL
DX ICD CODE: NORMAL
HPV I/H RISK 4 DNA CVX QL PROBE+SIG AMP: NEGATIVE
MEGA1 DNA VAG QL NAA+PROBE: ABNORMAL SCORE
N GONORRHOEA DNA VAG QL NAA+PROBE: NEGATIVE
OTHER STN SPEC: NORMAL
SERVICE CMNT-IMP: NORMAL
STAT OF ADQ CVX/VAG CYTO-IMP: NORMAL
T VAGINALIS DNA VAG QL NAA+PROBE: NEGATIVE

## 2025-05-14 RX ORDER — METRONIDAZOLE 500 MG/1
500 TABLET ORAL 2 TIMES DAILY
Qty: 14 TABLET | Refills: 0 | Status: SHIPPED | OUTPATIENT
Start: 2025-05-14 | End: 2025-05-22

## 2025-05-16 LAB
M GENITALIUM DNA SPEC QL NAA+PROBE: NEGATIVE
M HOMINIS DNA SPEC QL NAA+PROBE: NEGATIVE
UREAPLASMA DNA SPEC QL NAA+PROBE: POSITIVE

## 2025-05-18 LAB
ESTRADIOL SERPL-MCNC: 43.2 PG/ML
ESTROGEN SERPL-MCNC: 146 PG/ML
FERRITIN SERPL-MCNC: 112 NG/ML (ref 15–150)
FOLATE SERPL-MCNC: >20 NG/ML
FSH SERPL-ACNC: 34.7 MIU/ML
HCT VFR BLD AUTO: 41.6 % (ref 34–46.6)
HGB BLD-MCNC: 13.5 G/DL (ref 11.1–15.9)
INHIBIN A SERPL-MCNC: 1.7 PG/ML
IRON SATN MFR SERPL: 34 % (ref 15–55)
IRON SERPL-MCNC: 129 UG/DL (ref 27–159)
LH SERPL-ACNC: 18.5 MIU/ML
MIS SERPL-MCNC: <0.015 NG/ML
PROLACTIN SERPL-MCNC: 14.4 NG/ML (ref 4.8–33.4)
T4 FREE SERPL-MCNC: 1.46 NG/DL (ref 0.82–1.77)
TESTOST SERPL-MCNC: 30 NG/DL (ref 4–50)
TIBC SERPL-MCNC: 385 UG/DL (ref 250–450)
TSH SERPL DL<=0.005 MIU/L-ACNC: 3.69 UIU/ML (ref 0.45–4.5)
UIBC SERPL-MCNC: 256 UG/DL (ref 131–425)

## 2025-05-19 RX ORDER — DOXYCYCLINE 100 MG/1
100 TABLET ORAL 2 TIMES DAILY
Qty: 28 TABLET | Refills: 0 | Status: SHIPPED | OUTPATIENT
Start: 2025-05-19 | End: 2025-06-02

## 2025-05-19 RX ORDER — AZITHROMYCIN 500 MG/1
1000 TABLET, FILM COATED ORAL ONCE
Qty: 2 TABLET | Refills: 0 | Status: SHIPPED | OUTPATIENT
Start: 2025-05-19 | End: 2025-05-20

## 2025-05-19 NOTE — PROGRESS NOTES
GYN VISIT    Chief Complaint   Patient presents with    Follow-up     Here today for abnormal bleeding        SUBJECTIVE    Mary is a 49 y.o.  who presents with postmenopausal bleeding.  She was also seen in our office at her last visit for possible prolapse, which at that time there appeared to be a slight vaginal prolapse noted.  She did have an ultrasound performed prior to our visit today.  Also of note, her bleeding was began immediately following her abrupt discontinuation of her estrogen ring due to insurance issues.  Also of note was that at her last visit she was positive for ureaplasma and bacterial vaginosis.  She reports currently taking her entire course of medication.    LMP: No LMP recorded. Patient is premenarcheal.    Past Medical History:   Diagnosis Date    Abnormal ECG 2021    Right bundle branch block    Asthma     Breast mass     Benign tumor removed    Colon polyp 2021    4 benign polyps removed    Hypothyroidism     Lipoma     ANTERIOR NECK    Ovarian cyst     PONV (postoperative nausea and vomiting)     Slow to wake up after anesthesia         Past Surgical History:   Procedure Laterality Date    AUGMENTATION MAMMAPLASTY      BLADDER REPAIR      BLADDER SLING    BREAST AUGMENTATION      Rupture implant  revision done    BREAST BIOPSY       SECTION      Twins    COLONOSCOPY      ELBOW PROCEDURE Right 2022    ENDOSCOPY N/A 2022    Procedure: ESOPHAGOGASTRODUODENOSCOPY WITH BIOPSY;  Surgeon: Dandre Varela MD;  Location: [facility] MAIN OR;  Service: Gastroenterology;  Laterality: N/A;  MILD GASTRITIS    EXCISION MASS HEAD/NECK N/A 2022    Procedure: Excision anterior neck lipoma;  Surgeon: Jolanta Villalpando MD;  Location: [facility] MAIN OR;  Service: General;  Laterality: N/A;    FRACTURE SURGERY      Right elbow    KNEE SURGERY Right     OOPHORECTOMY          Review of Systems   Constitutional:  Negative for chills, diaphoresis, fatigue and  "fever.   Genitourinary:  Negative for decreased urine volume, difficulty urinating, dyspareunia, flank pain, frequency, genital sores, hematuria, menstrual problem, urgency, urinary incontinence, vaginal bleeding, vaginal discharge and vaginal pain.   Hematological:  Negative for adenopathy.   All other systems reviewed and are negative.      OBJECTIVE     Vitals:    05/22/25 1327   BP: 119/77   Weight: 78 kg (172 lb)   Height: 175.3 cm (69.02\")        Physical Exam  Constitutional:       General: She is awake.      Appearance: Normal appearance. She is well-developed and well-groomed.   HENT:      Head: Normocephalic and atraumatic.   Pulmonary:      Effort: Pulmonary effort is normal.   Musculoskeletal:      Cervical back: Normal range of motion.   Neurological:      General: No focal deficit present.      Mental Status: She is alert and oriented to person, place, and time.   Skin:     General: Skin is warm and dry.   Psychiatric:         Mood and Affect: Mood normal.         Behavior: Behavior normal. Behavior is cooperative.   Vitals reviewed.         ASSESSMENT/PLAN    Diagnoses and all orders for this visit:    1. Abnormal uterine bleeding (AUB) (Primary)    2. Postmenopausal bleeding    3. Vaginal prolapse  -     Ambulatory Referral to Physical Therapy for Evaluation & Treatment    Discussed strong suggestion of vaginal bleeding due to inadvertent cessation of estrogen ring due to insurance issues.  Discussed use of pelvic floor physical therapy for evaluating any prolapse that was seen on last vaginal exam.  Referral entered.  She will begin use of her estrogen ring.  If she continues to have bleeding, she will return to care within 1 month.    Return in about 1 month (around 6/22/2025), or if symptoms worsen or fail to improve.    I spent 30 minutes caring for Mary on this date of service. This time includes time spent by me in the following activities: preparing for the visit, reviewing tests, " performing a medically appropriate examination and/or evaluation, counseling and educating the patient/family/caregiver, referring and communicating with other health care professionals, documenting information in the medical record, independently interpreting results and communicating that information with the patient/family/caregiver, care coordination, ordering medications, ordering test(s), ordering procedure(s), obtaining a separately obtained history, and reviewing a separately obtained history.  Please note: Clinical decisions and patient counseling in this encounter were based on real-time bedside ultrasound findings and the clinical presentation at the time of evaluation. Final medical decision-making may be subject to change pending formal radiology interpretation of the ultrasound images. The patient will be contacted if any additional follow-up or changes to the care plan are indicated once the official report is reviewed.    Sunshine Padilla CNM  5/23/2025  10:40 EDT        PRELIMINARY ULTRASOUND RESULT  Study: Transvaginal pelvic ultrasound  Findings:  Uterus measures 7.86 x 4.23 x 3.98 cm, volume 69.286 cm3, anteverted  There are no intramural or subserosal fibroids or masses identified.  Endometrial thickness measures 0.49 cm, and is homogenous appearance without evidence of polyps.  Cervix is normal-appearing.  Left ovary: 2.73 x 1.22 x 1.21 cm, volume 2.110 cm3  There is no adnexal mass or cyst identified.  Right ovary: 1.75 x 1.00 x 0.94 cm, volume 0.861 cm3  There is no adnexal mass or cyst identified.  There is no free fluid.  Comparative data: not available for examination  Please note: Clinical decisions and patient counseling in this encounter were based on real-time bedside ultrasound findings and the clinical presentation at the time of evaluation. Final medical decision-making may be subject to change pending formal radiology interpretation of the ultrasound images. The patient will be  contacted if any additional follow-up or changes to the care plan are indicated once the official report is reviewed.  Sunshine Padilla CNM  5/23/2025  10:40 EDT

## 2025-05-22 ENCOUNTER — OFFICE VISIT (OUTPATIENT)
Dept: OBSTETRICS AND GYNECOLOGY | Facility: CLINIC | Age: 50
End: 2025-05-22
Payer: COMMERCIAL

## 2025-05-22 VITALS
BODY MASS INDEX: 25.48 KG/M2 | HEIGHT: 69 IN | WEIGHT: 172 LBS | SYSTOLIC BLOOD PRESSURE: 119 MMHG | DIASTOLIC BLOOD PRESSURE: 77 MMHG

## 2025-05-22 DIAGNOSIS — N95.0 POSTMENOPAUSAL BLEEDING: ICD-10-CM

## 2025-05-22 DIAGNOSIS — N81.10 VAGINAL PROLAPSE: ICD-10-CM

## 2025-05-22 DIAGNOSIS — N93.9 ABNORMAL UTERINE BLEEDING (AUB): Primary | ICD-10-CM

## 2025-07-07 ENCOUNTER — APPOINTMENT (OUTPATIENT)
Dept: GENERAL RADIOLOGY | Facility: HOSPITAL | Age: 50
End: 2025-07-07
Payer: COMMERCIAL

## 2025-07-07 ENCOUNTER — PROCEDURE VISIT (OUTPATIENT)
Dept: OBSTETRICS AND GYNECOLOGY | Facility: CLINIC | Age: 50
End: 2025-07-07
Payer: COMMERCIAL

## 2025-07-07 ENCOUNTER — HOSPITAL ENCOUNTER (EMERGENCY)
Facility: HOSPITAL | Age: 50
Discharge: HOME | End: 2025-07-07
Attending: EMERGENCY MEDICINE | Admitting: EMERGENCY MEDICINE
Payer: COMMERCIAL

## 2025-07-07 ENCOUNTER — OFFICE VISIT (OUTPATIENT)
Dept: OBSTETRICS AND GYNECOLOGY | Facility: CLINIC | Age: 50
End: 2025-07-07
Payer: COMMERCIAL

## 2025-07-07 VITALS
RESPIRATION RATE: 24 BRPM | SYSTOLIC BLOOD PRESSURE: 108 MMHG | HEIGHT: 69 IN | WEIGHT: 161.2 LBS | DIASTOLIC BLOOD PRESSURE: 71 MMHG | BODY MASS INDEX: 23.88 KG/M2 | HEART RATE: 95 BPM | OXYGEN SATURATION: 97 % | TEMPERATURE: 99.4 F

## 2025-07-07 VITALS — WEIGHT: 167.2 LBS | BODY MASS INDEX: 24.68 KG/M2

## 2025-07-07 DIAGNOSIS — N95.0 PMB (POSTMENOPAUSAL BLEEDING): ICD-10-CM

## 2025-07-07 DIAGNOSIS — E87.6 HYPOKALEMIA: ICD-10-CM

## 2025-07-07 DIAGNOSIS — I48.91 ATRIAL FIBRILLATION WITH RVR: ICD-10-CM

## 2025-07-07 DIAGNOSIS — I48.91 NEW ONSET A-FIB: Primary | ICD-10-CM

## 2025-07-07 DIAGNOSIS — R73.9 HYPERGLYCEMIA: ICD-10-CM

## 2025-07-07 DIAGNOSIS — Z79.890 HORMONE REPLACEMENT THERAPY (HRT): ICD-10-CM

## 2025-07-07 DIAGNOSIS — Z12.31 VISIT FOR SCREENING MAMMOGRAM: Primary | ICD-10-CM

## 2025-07-07 DIAGNOSIS — Z01.419 ENCOUNTER FOR ANNUAL ROUTINE GYNECOLOGICAL EXAMINATION: Primary | ICD-10-CM

## 2025-07-07 LAB
ALBUMIN SERPL-MCNC: 4.6 G/DL (ref 3.5–5.2)
ALBUMIN/GLOB SERPL: 1.2 G/DL
ALP SERPL-CCNC: 80 U/L (ref 39–117)
ALT SERPL W P-5'-P-CCNC: 13 U/L (ref 1–33)
ANION GAP SERPL CALCULATED.3IONS-SCNC: 18.1 MMOL/L (ref 5–15)
APTT PPP: 28.8 SECONDS (ref 22.7–35.4)
AST SERPL-CCNC: 21 U/L (ref 1–32)
B PARAPERT DNA SPEC QL NAA+PROBE: NOT DETECTED
B PERT DNA SPEC QL NAA+PROBE: NOT DETECTED
BASOPHILS # BLD AUTO: 0.02 10*3/MM3 (ref 0–0.2)
BASOPHILS NFR BLD AUTO: 0.2 % (ref 0–1.5)
BILIRUB SERPL-MCNC: 0.6 MG/DL (ref 0–1.2)
BUN SERPL-MCNC: 16 MG/DL (ref 6–20)
BUN/CREAT SERPL: 18.2 (ref 7–25)
C PNEUM DNA NPH QL NAA+NON-PROBE: NOT DETECTED
CALCIUM SPEC-SCNC: 9.9 MG/DL (ref 8.6–10.5)
CHLORIDE SERPL-SCNC: 96 MMOL/L (ref 98–107)
CO2 SERPL-SCNC: 19.9 MMOL/L (ref 22–29)
CREAT SERPL-MCNC: 0.88 MG/DL (ref 0.57–1)
DEPRECATED RDW RBC AUTO: 42.9 FL (ref 37–54)
EGFRCR SERPLBLD CKD-EPI 2021: 79.7 ML/MIN/1.73
EOSINOPHIL # BLD AUTO: 0.02 10*3/MM3 (ref 0–0.4)
EOSINOPHIL NFR BLD AUTO: 0.2 % (ref 0.3–6.2)
ERYTHROCYTE [DISTWIDTH] IN BLOOD BY AUTOMATED COUNT: 13.3 % (ref 12.3–15.4)
FLUAV SUBTYP SPEC NAA+PROBE: NOT DETECTED
FLUBV RNA NPH QL NAA+NON-PROBE: NOT DETECTED
GLOBULIN UR ELPH-MCNC: 3.8 GM/DL
GLUCOSE SERPL-MCNC: 117 MG/DL (ref 65–99)
HADV DNA SPEC NAA+PROBE: NOT DETECTED
HCOV 229E RNA SPEC QL NAA+PROBE: NOT DETECTED
HCOV HKU1 RNA SPEC QL NAA+PROBE: NOT DETECTED
HCOV NL63 RNA SPEC QL NAA+PROBE: NOT DETECTED
HCOV OC43 RNA SPEC QL NAA+PROBE: NOT DETECTED
HCT VFR BLD AUTO: 39.9 % (ref 34–46.6)
HGB BLD-MCNC: 13.6 G/DL (ref 12–15.9)
HMPV RNA NPH QL NAA+NON-PROBE: NOT DETECTED
HOLD SPECIMEN: NORMAL
HOLD SPECIMEN: NORMAL
HPIV1 RNA ISLT QL NAA+PROBE: NOT DETECTED
HPIV2 RNA SPEC QL NAA+PROBE: NOT DETECTED
HPIV3 RNA NPH QL NAA+PROBE: NOT DETECTED
HPIV4 P GENE NPH QL NAA+PROBE: NOT DETECTED
IMM GRANULOCYTES # BLD AUTO: 0.03 10*3/MM3 (ref 0–0.05)
IMM GRANULOCYTES NFR BLD AUTO: 0.3 % (ref 0–0.5)
INR PPP: 1.27 (ref 0.9–1.1)
LYMPHOCYTES # BLD AUTO: 1.42 10*3/MM3 (ref 0.7–3.1)
LYMPHOCYTES NFR BLD AUTO: 13.1 % (ref 19.6–45.3)
M PNEUMO IGG SER IA-ACNC: NOT DETECTED
MAGNESIUM SERPL-MCNC: 2.1 MG/DL (ref 1.6–2.6)
MCH RBC QN AUTO: 30.3 PG (ref 26.6–33)
MCHC RBC AUTO-ENTMCNC: 34.1 G/DL (ref 31.5–35.7)
MCV RBC AUTO: 88.9 FL (ref 79–97)
MONOCYTES # BLD AUTO: 1.53 10*3/MM3 (ref 0.1–0.9)
MONOCYTES NFR BLD AUTO: 14.1 % (ref 5–12)
NEUTROPHILS NFR BLD AUTO: 7.84 10*3/MM3 (ref 1.7–7)
NEUTROPHILS NFR BLD AUTO: 72.1 % (ref 42.7–76)
NRBC BLD AUTO-RTO: 0 /100 WBC (ref 0–0.2)
PLATELET # BLD AUTO: 190 10*3/MM3 (ref 140–450)
PMV BLD AUTO: 8.6 FL (ref 6–12)
POTASSIUM SERPL-SCNC: 3.4 MMOL/L (ref 3.5–5.2)
PROCALCITONIN SERPL-MCNC: 0.27 NG/ML (ref 0–0.25)
PROT SERPL-MCNC: 8.4 G/DL (ref 6–8.5)
PROTHROMBIN TIME: 15.9 SECONDS (ref 11.7–14.2)
RBC # BLD AUTO: 4.49 10*6/MM3 (ref 3.77–5.28)
RHINOVIRUS RNA SPEC NAA+PROBE: NOT DETECTED
RSV RNA NPH QL NAA+NON-PROBE: NOT DETECTED
SARS-COV-2 RNA NPH QL NAA+NON-PROBE: NOT DETECTED
SODIUM SERPL-SCNC: 134 MMOL/L (ref 136–145)
TROPONIN T SERPL HS-MCNC: 6 NG/L
TSH SERPL DL<=0.05 MIU/L-ACNC: 1.43 UIU/ML (ref 0.27–4.2)
WBC NRBC COR # BLD AUTO: 10.86 10*3/MM3 (ref 3.4–10.8)
WHOLE BLOOD HOLD COAG: NORMAL
WHOLE BLOOD HOLD SPECIMEN: NORMAL

## 2025-07-07 PROCEDURE — 93005 ELECTROCARDIOGRAM TRACING: CPT | Performed by: EMERGENCY MEDICINE

## 2025-07-07 PROCEDURE — 96375 TX/PRO/DX INJ NEW DRUG ADDON: CPT

## 2025-07-07 PROCEDURE — 99284 EMERGENCY DEPT VISIT MOD MDM: CPT

## 2025-07-07 PROCEDURE — 77067 SCR MAMMO BI INCL CAD: CPT | Performed by: OBSTETRICS & GYNECOLOGY

## 2025-07-07 PROCEDURE — 25010000002 METOPROLOL TARTRATE: Performed by: EMERGENCY MEDICINE

## 2025-07-07 PROCEDURE — 84484 ASSAY OF TROPONIN QUANT: CPT | Performed by: EMERGENCY MEDICINE

## 2025-07-07 PROCEDURE — 85610 PROTHROMBIN TIME: CPT | Performed by: EMERGENCY MEDICINE

## 2025-07-07 PROCEDURE — 80050 GENERAL HEALTH PANEL: CPT | Performed by: EMERGENCY MEDICINE

## 2025-07-07 PROCEDURE — 93005 ELECTROCARDIOGRAM TRACING: CPT

## 2025-07-07 PROCEDURE — 25810000003 SODIUM CHLORIDE 0.9 % SOLUTION: Performed by: EMERGENCY MEDICINE

## 2025-07-07 PROCEDURE — 71045 X-RAY EXAM CHEST 1 VIEW: CPT

## 2025-07-07 PROCEDURE — 25010000002 MAGNESIUM SULFATE 2 GM/50ML SOLUTION: Performed by: EMERGENCY MEDICINE

## 2025-07-07 PROCEDURE — 84145 PROCALCITONIN (PCT): CPT | Performed by: EMERGENCY MEDICINE

## 2025-07-07 PROCEDURE — 25010000002 ADENOSINE PER 6 MG: Performed by: EMERGENCY MEDICINE

## 2025-07-07 PROCEDURE — 85730 THROMBOPLASTIN TIME PARTIAL: CPT | Performed by: EMERGENCY MEDICINE

## 2025-07-07 PROCEDURE — 96365 THER/PROPH/DIAG IV INF INIT: CPT

## 2025-07-07 PROCEDURE — 0202U NFCT DS 22 TRGT SARS-COV-2: CPT | Performed by: EMERGENCY MEDICINE

## 2025-07-07 PROCEDURE — 77063 BREAST TOMOSYNTHESIS BI: CPT | Performed by: OBSTETRICS & GYNECOLOGY

## 2025-07-07 PROCEDURE — 83735 ASSAY OF MAGNESIUM: CPT | Performed by: EMERGENCY MEDICINE

## 2025-07-07 RX ORDER — PROGESTERONE 100 MG/1
100 CAPSULE ORAL DAILY
Qty: 90 CAPSULE | Refills: 3 | Status: SHIPPED | OUTPATIENT
Start: 2025-07-07

## 2025-07-07 RX ORDER — POTASSIUM CHLORIDE 1500 MG/1
40 TABLET, EXTENDED RELEASE ORAL ONCE
Status: COMPLETED | OUTPATIENT
Start: 2025-07-07 | End: 2025-07-07

## 2025-07-07 RX ORDER — METOPROLOL SUCCINATE 25 MG/1
25 TABLET, EXTENDED RELEASE ORAL ONCE
Status: COMPLETED | OUTPATIENT
Start: 2025-07-07 | End: 2025-07-07

## 2025-07-07 RX ORDER — METOPROLOL SUCCINATE 25 MG/1
25 TABLET, EXTENDED RELEASE ORAL DAILY
Qty: 30 TABLET | Refills: 0 | Status: SHIPPED | OUTPATIENT
Start: 2025-07-07

## 2025-07-07 RX ORDER — ESTRADIOL 0.75 MG/1.25G
1.25 GEL, METERED TOPICAL DAILY
Qty: 150 G | Refills: 3 | Status: SHIPPED | OUTPATIENT
Start: 2025-07-07 | End: 2026-07-07

## 2025-07-07 RX ORDER — ADENOSINE 3 MG/ML
6 INJECTION, SOLUTION INTRAVENOUS ONCE
Status: COMPLETED | OUTPATIENT
Start: 2025-07-07 | End: 2025-07-07

## 2025-07-07 RX ORDER — ADENOSINE 3 MG/ML
12 INJECTION, SOLUTION INTRAVENOUS
Status: DISCONTINUED | OUTPATIENT
Start: 2025-07-07 | End: 2025-07-07 | Stop reason: HOSPADM

## 2025-07-07 RX ORDER — MAGNESIUM SULFATE HEPTAHYDRATE 40 MG/ML
2 INJECTION, SOLUTION INTRAVENOUS ONCE
Status: COMPLETED | OUTPATIENT
Start: 2025-07-07 | End: 2025-07-07

## 2025-07-07 RX ORDER — ACETAMINOPHEN 500 MG
1000 TABLET ORAL ONCE
Status: COMPLETED | OUTPATIENT
Start: 2025-07-07 | End: 2025-07-07

## 2025-07-07 RX ORDER — SODIUM CHLORIDE 0.9 % (FLUSH) 0.9 %
10 SYRINGE (ML) INJECTION AS NEEDED
Status: DISCONTINUED | OUTPATIENT
Start: 2025-07-07 | End: 2025-07-07 | Stop reason: HOSPADM

## 2025-07-07 RX ADMIN — METOPROLOL SUCCINATE 25 MG: 25 TABLET, EXTENDED RELEASE ORAL at 19:01

## 2025-07-07 RX ADMIN — POTASSIUM CHLORIDE 40 MEQ: 1500 TABLET, EXTENDED RELEASE ORAL at 17:37

## 2025-07-07 RX ADMIN — METOROPROLOL TARTRATE 5 MG: 5 INJECTION, SOLUTION INTRAVENOUS at 16:15

## 2025-07-07 RX ADMIN — SODIUM CHLORIDE 500 ML: 9 INJECTION, SOLUTION INTRAVENOUS at 16:14

## 2025-07-07 RX ADMIN — MAGNESIUM SULFATE HEPTAHYDRATE 2 G: 40 INJECTION, SOLUTION INTRAVENOUS at 16:05

## 2025-07-07 RX ADMIN — IBUPROFEN 600 MG: 200 TABLET, FILM COATED ORAL at 19:01

## 2025-07-07 RX ADMIN — ADENOSINE 12 MG: 3 INJECTION INTRAVENOUS at 15:52

## 2025-07-07 RX ADMIN — METOROPROLOL TARTRATE 5 MG: 5 INJECTION, SOLUTION INTRAVENOUS at 15:56

## 2025-07-07 RX ADMIN — ADENOSINE 6 MG: 3 INJECTION INTRAVENOUS at 15:50

## 2025-07-07 RX ADMIN — SODIUM CHLORIDE 500 ML: 9 INJECTION, SOLUTION INTRAVENOUS at 15:45

## 2025-07-07 RX ADMIN — ACETAMINOPHEN 1000 MG: 500 TABLET, FILM COATED ORAL at 19:01

## 2025-07-07 NOTE — ED NOTES
Pt was walking around 1500 when she started to have palpitations, dizziness and soa. No relief with vagal maneuvers. Pt has hx of svt. Pt reports nausea and decreased appetite over the weekend

## 2025-07-07 NOTE — ED PROVIDER NOTES
EMERGENCY DEPARTMENT ENCOUNTER  Room Number:  26/26  Date of encounter:  7/7/2025  PCP: Rosita Scott PA-C  Patient Care Team:  Rosita Scott PA-C as PCP - General (Family Medicine)  Dandre Varela MD as Consulting Physician (Gastroenterology)  Vinh Martinez MD as Surgeon (Hand Surgery)  Chetan Kim MD as Consulting Physician (Obstetrics and Gynecology)  Arnel Cee MD as Consulting Physician (Cardiology)  Adrian Bustillo MD as Surgeon (General Surgery)  Maverick Wright MD as Consulting Physician (Orthopedic Surgery)  Williams Miles MD as Consulting Physician (Orthopedic Surgery)     HPI:  Context: Mary Alves is a 50 y.o. female who presents to the ED c/o chief complaint of palpitations.  Patient reports sudden heart racing that began approximately an hour ago.  Patient reports history of SVT in the past, occurred while she was on a cruise, reports that she was given metoprolol, went into A-fib, converted to normal sinus rhythm.  Patient reports that she did not follow-up with her primary care or cardiologist.  Patient reports that time she felt it was secondary to multiple espresso martinis.  Patient reports that she has been feeling ill, believes that she got sick by contact with the patient.  Patient denies any fevers but reports that she has had generalized malaise, nonproductive cough, nausea with some diarrhea, no blood or mucus.  Patient denies any chronic medical problem, not on any medications.    MEDICAL HISTORY REVIEW  Reviewed in EPIC    PAST MEDICAL HISTORY  Active Ambulatory Problems     Diagnosis Date Noted    Abdominal pain 03/04/2022    Abnormal CT of the abdomen 03/29/2022    Hypothyroidism, acquired 05/26/2022    Subcutaneous mass of neck 07/14/2022     Resolved Ambulatory Problems     Diagnosis Date Noted    No Resolved Ambulatory Problems     Past Medical History:   Diagnosis Date    Abnormal ECG 11/2021    Asthma     Breast mass 1997    Colon polyp 9/2021     Hypothyroidism     Lipoma     Ovarian cyst     PONV (postoperative nausea and vomiting)     Slow to wake up after anesthesia        PAST SURGICAL HISTORY  Past Surgical History:   Procedure Laterality Date    AUGMENTATION MAMMAPLASTY      BLADDER REPAIR      BLADDER SLING    BREAST AUGMENTATION  2014    Rupture implant 2020 revision done    BREAST BIOPSY       SECTION      Twins    COLONOSCOPY      ELBOW PROCEDURE Right 2022    ENDOSCOPY N/A 2022    Procedure: ESOPHAGOGASTRODUODENOSCOPY WITH BIOPSY;  Surgeon: Dandre Varela MD;  [place] [facility];  Service: Gastroenterology;  Laterality: N/A;  MILD GASTRITIS    EXCISION MASS HEAD/NECK N/A 2022    Procedure: Excision anterior neck lipoma;  Surgeon: Jolanta Villalpando MD;  Location: [facility] MAIN OR;  Service: General;  Laterality: N/A;    FRACTURE SURGERY      Right elbow    KNEE SURGERY Right     OOPHORECTOMY         FAMILY HISTORY  Family History   Problem Relation Age of Onset    Colon polyps Mother     Heart attack Mother     Colon cancer Mother     Arthritis Mother     Depression Mother     Heart disease Mother          from anterior lateral MI    Brain cancer Father     Heart attack Father     Crohn's disease Neg Hx     Irritable bowel syndrome Neg Hx     Ulcerative colitis Neg Hx     Malig Hyperthermia Neg Hx        SOCIAL HISTORY  Social History     Socioeconomic History    Marital status:    Tobacco Use    Smoking status: Never     Passive exposure: Never    Smokeless tobacco: Never   Vaping Use    Vaping status: Never Used   Substance and Sexual Activity    Alcohol use: Yes     Comment: occasionally    Drug use: No    Sexual activity: Yes     Partners: Male     Birth control/protection: Condom, None       ALLERGIES  Patient has no known allergies.    The patient's allergies have been reviewed    PHYSICAL EXAM  I have reviewed the triage vital signs and nursing notes.  ED Triage Vitals   Temp Heart Rate Resp  BP SpO2   07/07/25 1538 07/07/25 1533 07/07/25 1538 07/07/25 1535 07/07/25 1535   99.4 °F (37.4 °C) (!) 179 24 110/98 97 %      Temp src Heart Rate Source Patient Position BP Location FiO2 (%)   07/07/25 1538 -- -- -- --   Oral           General: No acute distress.  HENT: NCAT, PERRL, Nares patent.  Eyes: no scleral icterus.  Neck: trachea midline, no ROM limitations.  CV: Extremely tachycardic  Respiratory: normal effort, CTAB.  Abdomen: soft, nondistended, NTTP, no rebound tenderness, no guarding or rigidity.  Musculoskeletal: no deformity.  Neuro: alert, moves all extremities, follows commands.  Skin: warm, dry.    LAB RESULTS  Recent Results (from the past 24 hours)   ECG 12 Lead ED Triage Standing Order; Dysrhythmia    Collection Time: 07/07/25  3:33 PM   Result Value Ref Range    QT Interval 268 ms    QTC Interval 477 ms   Comprehensive Metabolic Panel    Collection Time: 07/07/25  3:39 PM    Specimen: Blood   Result Value Ref Range    Glucose 117 (H) 65 - 99 mg/dL    BUN 16.0 6.0 - 20.0 mg/dL    Creatinine 0.88 0.57 - 1.00 mg/dL    Sodium 134 (L) 136 - 145 mmol/L    Potassium 3.4 (L) 3.5 - 5.2 mmol/L    Chloride 96 (L) 98 - 107 mmol/L    CO2 19.9 (L) 22.0 - 29.0 mmol/L    Calcium 9.9 8.6 - 10.5 mg/dL    Total Protein 8.4 6.0 - 8.5 g/dL    Albumin 4.6 3.5 - 5.2 g/dL    ALT (SGPT) 13 1 - 33 U/L    AST (SGOT) 21 1 - 32 U/L    Alkaline Phosphatase 80 39 - 117 U/L    Total Bilirubin 0.6 0.0 - 1.2 mg/dL    Globulin 3.8 gm/dL    A/G Ratio 1.2 g/dL    BUN/Creatinine Ratio 18.2 7.0 - 25.0    Anion Gap 18.1 (H) 5.0 - 15.0 mmol/L    eGFR 79.7 >60.0 mL/min/1.73   Magnesium    Collection Time: 07/07/25  3:39 PM    Specimen: Blood   Result Value Ref Range    Magnesium 2.1 1.6 - 2.6 mg/dL   High Sensitivity Troponin T    Collection Time: 07/07/25  3:39 PM    Specimen: Blood   Result Value Ref Range    HS Troponin T 6 <14 ng/L   TSH Rfx On Abnormal To Free T4    Collection Time: 07/07/25  3:39 PM    Specimen: Blood   Result  Value Ref Range    TSH 1.430 0.270 - 4.200 uIU/mL   Green Top (Gel)    Collection Time: 07/07/25  3:39 PM   Result Value Ref Range    Extra Tube Hold for add-ons.    Lavender Top    Collection Time: 07/07/25  3:39 PM   Result Value Ref Range    Extra Tube hold for add-on    Gold Top - SST    Collection Time: 07/07/25  3:39 PM   Result Value Ref Range    Extra Tube Hold for add-ons.    Light Blue Top    Collection Time: 07/07/25  3:39 PM   Result Value Ref Range    Extra Tube Hold for add-ons.    CBC Auto Differential    Collection Time: 07/07/25  3:39 PM    Specimen: Blood   Result Value Ref Range    WBC 10.86 (H) 3.40 - 10.80 10*3/mm3    RBC 4.49 3.77 - 5.28 10*6/mm3    Hemoglobin 13.6 12.0 - 15.9 g/dL    Hematocrit 39.9 34.0 - 46.6 %    MCV 88.9 79.0 - 97.0 fL    MCH 30.3 26.6 - 33.0 pg    MCHC 34.1 31.5 - 35.7 g/dL    RDW 13.3 12.3 - 15.4 %    RDW-SD 42.9 37.0 - 54.0 fl    MPV 8.6 6.0 - 12.0 fL    Platelets 190 140 - 450 10*3/mm3    Neutrophil % 72.1 42.7 - 76.0 %    Lymphocyte % 13.1 (L) 19.6 - 45.3 %    Monocyte % 14.1 (H) 5.0 - 12.0 %    Eosinophil % 0.2 (L) 0.3 - 6.2 %    Basophil % 0.2 0.0 - 1.5 %    Immature Grans % 0.3 0.0 - 0.5 %    Neutrophils, Absolute 7.84 (H) 1.70 - 7.00 10*3/mm3    Lymphocytes, Absolute 1.42 0.70 - 3.10 10*3/mm3    Monocytes, Absolute 1.53 (H) 0.10 - 0.90 10*3/mm3    Eosinophils, Absolute 0.02 0.00 - 0.40 10*3/mm3    Basophils, Absolute 0.02 0.00 - 0.20 10*3/mm3    Immature Grans, Absolute 0.03 0.00 - 0.05 10*3/mm3    nRBC 0.0 0.0 - 0.2 /100 WBC   Procalcitonin    Collection Time: 07/07/25  3:39 PM    Specimen: Blood   Result Value Ref Range    Procalcitonin 0.27 (H) 0.00 - 0.25 ng/mL   Protime-INR    Collection Time: 07/07/25  3:39 PM    Specimen: Blood   Result Value Ref Range    Protime 15.9 (H) 11.7 - 14.2 Seconds    INR 1.27 (H) 0.90 - 1.10   aPTT    Collection Time: 07/07/25  3:39 PM    Specimen: Blood   Result Value Ref Range    PTT 28.8 22.7 - 35.4 seconds   Respiratory Panel  PCR w/COVID-19(SARS-CoV-2) PHONG/KRISHNA/NIKOS/PAD/COR/GIANNA In-House, NP Swab in UTM/VTM, 2 HR TAT - Swab, Nasopharynx    Collection Time: 07/07/25  4:01 PM    Specimen: Nasopharynx; Swab   Result Value Ref Range    ADENOVIRUS, PCR Not Detected Not Detected    Coronavirus 229E Not Detected Not Detected    Coronavirus HKU1 Not Detected Not Detected    Coronavirus NL63 Not Detected Not Detected    Coronavirus OC43 Not Detected Not Detected    COVID19 Not Detected Not Detected - Ref. Range    Human Metapneumovirus Not Detected Not Detected    Human Rhinovirus/Enterovirus Not Detected Not Detected    Influenza A PCR Not Detected Not Detected    Influenza B PCR Not Detected Not Detected    Parainfluenza Virus 1 Not Detected Not Detected    Parainfluenza Virus 2 Not Detected Not Detected    Parainfluenza Virus 3 Not Detected Not Detected    Parainfluenza Virus 4 Not Detected Not Detected    RSV, PCR Not Detected Not Detected    Bordetella pertussis pcr Not Detected Not Detected    Bordetella parapertussis PCR Not Detected Not Detected    Chlamydophila pneumoniae PCR Not Detected Not Detected    Mycoplasma pneumo by PCR Not Detected Not Detected   ECG 12 Lead Other; PCP requested    Collection Time: 07/07/25  5:36 PM   Result Value Ref Range    QT Interval 364 ms    QTC Interval 447 ms       I ordered the above labs and reviewed the results.    RADIOLOGY  XR Chest 1 View  Result Date: 7/7/2025  ONE-VIEW PORTABLE CHEST AT 3:57 P.M.  HISTORY: Dysrhythmia.  FINDINGS: The lungs are well expanded and clear and the heart size is normal. There is no acute disease or change from 3/4/2022.  This report was finalized on 7/7/2025 4:14 PM by Dr. Carlos Banegas M.D on Workstation: [id number]        I ordered the above noted radiological studies. I reviewed the images and results. I agree with the radiologist interpretation.    PROCEDURES  Chemical Cardioversion    Date/Time: 7/7/2025 3:59 PM    Performed by: Prabhjot Davis MD  Authorized  by: Prabhjot Davis MD    Consent:     Consent obtained:  Verbal    Consent given by:  Patient    Risks discussed:  Death, induced arrhythmia and pain    Alternatives discussed:  No treatment, electrical cardioversion, alternative treatment, delayed treatment and observation  Pre-procedure details:     Cardioversion basis:  Urgent    Rhythm:  Supraventricular tachycardia  Attempt one:     Cardioversion medication:  Adenosine 6mg      Medication outcome:  No change in rhythm  Attempt two:     Cardioversion medication:  Adenosine 12mg      Shock outcome:  Conversion to other rhythm (Initial adenosine did not slow heart rate down to evaluate underlying rhythm, after second dose of adenosine patient did slow down to show underlying A-fib RVR but then rate increased again)   Post-procedure details:     Patient status:  Awake    Patient tolerance of procedure:  Tolerated well, no immediate complications      MEDICATIONS GIVEN IN ER  Medications   sodium chloride 0.9 % flush 10 mL (has no administration in time range)   adenosine (ADENOCARD) injection 12 mg (12 mg Intravenous Given 7/7/25 1552)   metoprolol tartrate (LOPRESSOR) injection 5 mg (0 mg Intravenous Hold 7/7/25 1736)   acetaminophen (TYLENOL) tablet 1,000 mg (has no administration in time range)   ibuprofen (ADVIL,MOTRIN) tablet 600 mg (has no administration in time range)   metoprolol succinate XL (TOPROL-XL) 24 hr tablet 25 mg (has no administration in time range)   adenosine (ADENOCARD) injection 6 mg (6 mg Intravenous Given 7/7/25 1550)   sodium chloride 0.9 % bolus 500 mL (0 mL Intravenous Stopped 7/7/25 1614)   magnesium sulfate 2g/50 mL (PREMIX) infusion (0 g Intravenous Stopped 7/7/25 1653)   sodium chloride 0.9 % bolus 500 mL (500 mL Intravenous New Bag 7/7/25 1614)   potassium chloride (KLOR-CON M20) CR tablet 40 mEq (40 mEq Oral Given 7/7/25 1737)       PROGRESS, DATA ANALYSIS, CONSULTS, AND MEDICAL DECISION MAKING  A complete history and physical  exam have been performed.  All available laboratory and imaging results have been reviewed by myself prior to disposition.    MDM    After the initial H&P, I discussed pertinent information from history and physical exam with patient/family.  Discussed differential diagnosis.  Discussed plan for ED evaluation/workup/treatment.  All questions answered.  Patient/family is agreeable with plan.  ED Course as of 07/07/25 1851   Mon Jul 07, 2025   1530 My differential diagnosis for palpitations includes but is not limited to    Arrhythmias  Atrial fibrillation/flutter  Bradycardia caused by advanced arteriovenous  block or sinus node dysfunction  Bradycardia-tachycardia syndrome (sick sinus syndrome)  Multifocal atrial tachycardia  Premature supraventricular or ventricular contractions  Sinus tachycardia or arrhythmia  Supraventricular tachycardia  Ventricular tachycardia  Reg-Parkinson-White syndrome     Psychiatric causes  Anxiety disorder  Panic attacks  Drugs and medications  Alcohol  Caffeine  Certain prescription and over-the-counter agents (e.g., digitalis, phenothiazine, theophylline, beta agonists)  Street drugs (e.g., cocaine)  Tobacco    Nonarrhythmic cardiac causes  Atrial or ventricular septal defect  Cardiomyopathy  Congenital heart disease  Congestive heart failure  Mitral valve prolapse  Pacemaker-mediated tachycardia  Pericarditis  Valvular disease (e.g., aortic insufficiency, stenosis)    Extracardiac causes  Anemia  Electrolyte imbalance  Fever  Hyperthyroidism  Hypoglycemia  Hypovolemia  Pheochromocytoma  Pulmonary disease  Vasovagal syndrome          [JG]   1540 EKG independently viewed and contemporaneously interpreted by ED physician. Time: 1533.  Rate 190.  Interpretation: SVT, right axis deviation, right bundle branch block, nonspecific ST changes, QTc normal. [JG]   1556 Status post second chemical cardioversion, underlying rhythm appears to be A-fib with RVR.  Began treatment for A-fib with  RVR, using rate controlling medications, giving magnesium and IV fluids as adjuvant, will use IV drips as needed. [JG]   1603 I reviewed chest x-ray in PACS, no pulmonary infiltrates per my read. [JG]   1613 Patient's status post first dose of metoprolol, heart rate improved but continues to be in RVR, heart rate currently in the 130s to 140s.  Blood pressure became soft for a minute at 93/74 but is currently normal in the 120s over 70s.  Given additional IV fluids, giving second dose of metoprolol. [JG]   1705 Potassium 3.4, replacing orally.  Magnesium normal. [JG]   1705 I reviewed chest x-ray imaging in PACS, no pulmonary infiltrates per my read. [JG]   1705 Patient status post 2 doses of metoprolol, continues to be in RVR, giving additional metoprolol. [JG]   1727 Heart rate improved, now in the 80s to 90s [JG]   1736 [id number] Score for Atrial Fibrillation Stroke Risk - MDCalc  Calculated on Jul 07 2025 5:36 PM  0 points -> [id number] Score  Anticoagulation is not recommended -> Ischemic stroke incidence rate: 0.5 per 100 patient years [JG]   1736 Patient reassessed, discussed ED workup and results.  Offered admission for cardiology consult versus cardiology consult with discharge and outpatient follow-up.  Had extensive discussion risk and benefits.  Patient reports that she wishes to think about it and will give me a decision shortly. [JG]   1737 Patient appears to have converted into normal sinus rhythm, obtain repeat EKG. [JG]   1738 EKG independently viewed and contemporaneously interpreted by ED physician. Time: 1736.  Rate 90.  Interpretation: Normal sinus rhythm, normal axis, right bundle branch block, no acute ST changes. [JG]   1758 Patient negative for COVID and influenza. [JG]   1807 Patient reassessed, reports that she would prefer discharge with outpatient follow-up.  Discussed plan for cardiology consult which patient is agreeable with. [JG]   1842 Phone call with Dr. Reed, cardiology.   Discussed the patient, relevant history, exam, diagnostics, ED findings/progress, and concerns.  She agrees that with [id number] or 0 patient does not need anticoagulation, she recommends starting patient on 25 mg metoprolol extended release once daily and recommends discharge with outpatient follow-up.  [JG]   1850 Patient reassessed, discussed cardiology consult with recommendation for initiating metoprolol and discharge with outpatient follow-up.  Discussed need for close follow-up with primary care and cardiology, extensive discussion return precautions, patient discharged. [JG]      ED Course User Index  [JG] Prabhjot Davis MD       AS OF 18:51 EDT VITALS:    BP - 104/69  HR - 85  TEMP - 99.4 °F (37.4 °C) (Oral)  O2 SATS - 96%    DIAGNOSIS  Final diagnoses:   New onset a-fib   Atrial fibrillation with RVR   Hyperglycemia   Hypokalemia         DISPOSITION  DISCHARGE    Patient discharged in stable condition.    Reviewed implications of results, diagnosis, meds, responsibility to follow up, warning signs and symptoms of possible worsening, potential complications and reasons to return to ER.    Patient/Family voiced understanding of above instructions.    Discussed plan for discharge, as there is no emergent indication for admission. Patient referred to primary care provider for BP management due to today's BP. Pt/family is agreeable and understands need for follow up and repeat testing.  Pt is aware that discharge does not mean that nothing is wrong but it indicates no emergency is present that requires admission and they must continue care with follow-up as given below or physician of their choice.     FOLLOW-UP  Rosita Scott PA-C  [address] [facility]  [address]  [place] [address] [phone]    Schedule an appointment as soon as possible for a visit in 2 days      [facility] CARDIOLOGY  [address]  [address]  [facility] [address] [phone]  Schedule an appointment  as soon as possible for a visit in 2 days           Medication List        New Prescriptions      metoprolol succinate XL 25 MG 24 hr tablet  Commonly known as: TOPROL-XL  Take 1 tablet by mouth Daily.               Where to Get Your Medications        These medications were sent to [place] [address]      Hours: Monday to Friday 7 AM to 6 PM, Saturday & Sunday 8 AM to 4:30 PM (Closed 12 PM to 12:30 PM) Phone: [phone]   metoprolol succinate XL 25 MG 24 hr tablet            Prabhjot Davis MD  07/07/25 0358

## 2025-07-07 NOTE — PROGRESS NOTES
HPI   Mary Alves  is a 50 y.o. female who presents for 2 reasons.  First, she would like to have a routine gynecologic exam.  Overall, she is feeling well.  Bowels and bladder are functioning normally.  Mammogram was performed today.  The patient is current with colon cancer screening.  Next, she had a workup recently with Sunshine Padilla regarding postmenopausal bleeding.  We reviewed the workup as well as the patient's imaging.  Ultrasound showed normal endometrial thickness of 0.49 cm.  The uterus was normal in size and the adnexa were normal as well.  The patient attributes her bleeding to withdrawal of estrogen.  Her insurance stopped covering her Femring and she stopped taking hormones.  Her last episode of bleeding was approximately 1 month ago.    Chief Complaint   Patient presents with    Annual Exam       Past Medical History:   Diagnosis Date    Abnormal ECG 2021    Right bundle branch block    Asthma     Breast mass     Benign tumor removed    Colon polyp 2021    4 benign polyps removed    Hypothyroidism     Lipoma     ANTERIOR NECK    Ovarian cyst     PONV (postoperative nausea and vomiting)     Slow to wake up after anesthesia        Past Surgical History:   Procedure Laterality Date    AUGMENTATION MAMMAPLASTY      BLADDER REPAIR      BLADDER SLING    BREAST AUGMENTATION      Rupture implant  revision done    BREAST BIOPSY       SECTION      Twins    COLONOSCOPY      ELBOW PROCEDURE Right 2022    ENDOSCOPY N/A 2022    Procedure: ESOPHAGOGASTRODUODENOSCOPY WITH BIOPSY;  Surgeon: Dandre Varela MD;  Location: [facility] MAIN OR;  Service: Gastroenterology;  Laterality: N/A;  MILD GASTRITIS    EXCISION MASS HEAD/NECK N/A 2022    Procedure: Excision anterior neck lipoma;  Surgeon: Jolanta Villalpando MD;  Location: [facility] MAIN OR;  Service: General;  Laterality: N/A;    FRACTURE SURGERY      Right elbow    KNEE SURGERY Right     OOPHORECTOMY         Social  History     Socioeconomic History    Marital status:    Tobacco Use    Smoking status: Never     Passive exposure: Never    Smokeless tobacco: Never   Vaping Use    Vaping status: Never Used   Substance and Sexual Activity    Alcohol use: Yes     Comment: occasionally    Drug use: No    Sexual activity: Yes     Partners: Male     Birth control/protection: Condom, None       The following portions of the patient's history were reviewed and updated as appropriate: allergies, current medications, past family history, past medical history, past social history, past surgical history and problem list.    Review of Systems     This is positive for postmenopausal bleeding, now resolved.  It is negative for fever or chills.  Negative for nausea or vomiting.  Negative for unexplained weight change.  All other systems are reviewed and are negative.    Physical Exam  Vitals and nursing note reviewed.   Constitutional:       Appearance: She is well-developed.   HENT:      Head: Normocephalic and atraumatic.   Cardiovascular:      Rate and Rhythm: Normal rate and regular rhythm.   Pulmonary:      Effort: Pulmonary effort is normal.      Breath sounds: Normal breath sounds. No wheezing or rales.   Chest:      Comments: Breast implants are in place bilaterally.  There are no palpable breast lumps.  Nipple discharge and axillary adenopathy are absent.  Abdominal:      General: There is no distension.      Palpations: Abdomen is soft.      Tenderness: There is no abdominal tenderness.   Genitourinary:     Labia:         Right: No lesion.         Left: No lesion.       Vagina: Normal. No vaginal discharge.      Cervix: No cervical motion tenderness.      Adnexa:         Right: No mass or tenderness.          Left: No mass or tenderness.        Comments: The uterus is small and mobile within the pelvis.  It is not tender to palpation.  Skin:     General: Skin is warm and dry.   Neurological:      Mental Status: She is alert and  oriented to person, place, and time.         Assessment    Diagnoses and all orders for this visit:    1. Encounter for annual routine gynecological examination (Primary)    2. PMB (postmenopausal bleeding)    3. Hormone replacement therapy (HRT)    Other orders  -     Estradiol 0.75 MG/1.25 GM (0.06%) topical gel; Place 1.25 g on the skin as directed by provider Daily.  Dispense: 150 g; Refill: 3  -     Progesterone (Prometrium) 100 MG capsule; Take 1 capsule by mouth Daily.  Dispense: 90 capsule; Refill: 3        Plan  Normal gynecologic exam  Counseled regarding the importance of regular screening mammograms.  Mammogram was performed today.  Counseled regarding colon cancer screening.  The patient has a colonoscopy once every 5 years.  She is current with this.  Hormone replacement.  Counseled.  The patient would like to restart estrogen.  Her insurance does not cover the vaginal ring.  She would like to go back to transdermal gel.  Prescription for estrogen gel along with Prometrium has been sent and the patient has been counseled regarding proper use.  Episode of postmenopausal bleeding, now resolved.  We reviewed the workup and imaging to this point.  This shows a normal endometrial thickness.  The patient does not have uterine enlargement on pelvic examination and does not have risk factors for endometrial cancer.  She is not overweight, is not diabetic and is not hypertensive.  She does not have family history.  We discussed the possibility of an endometrial biopsy to definitively rule out endometrial hyperplasia or neoplasia.  The patient declines this and I agree.  Most likely, the patient's bleeding is due to withdrawal of estrogen.  If bleeding should recur, the patient will follow-up for further workup.    No follow-ups on file.    Social History     Tobacco Use   Smoking Status Never    Passive exposure: Never   Smokeless Tobacco Never

## 2025-07-07 NOTE — DISCHARGE INSTRUCTIONS
You have been given emergency department evaluation.  This evaluation is intended to rule out life-threatening conditions.  Is not a complete evaluation.  You could require further testing as determined by your primary care physician or any referred specialist.  Please follow-up with all doctors that you are referred to.  Please be sure to take your prescribed medications and follow any specific instructions in the discharge instructions.  Please follow-up with your primary care physician within 48 hours.  Please have your primary care provider recheck your blood pressure.  Please return to the emergency department if you experience chest pain, shortness of breath, abdominal pain, fever greater than 102, intractable vomiting.  Please return to the emergency department if your symptoms continue or worsen, or if you begin to experience any other concerning symptom.    
Yes

## 2025-07-08 ENCOUNTER — HOSPITAL ENCOUNTER (OUTPATIENT)
Facility: HOSPITAL | Age: 50
Setting detail: OBSERVATION
Discharge: HOME | End: 2025-07-09
Attending: EMERGENCY MEDICINE | Admitting: EMERGENCY MEDICINE
Payer: COMMERCIAL

## 2025-07-08 ENCOUNTER — APPOINTMENT (OUTPATIENT)
Dept: CT IMAGING | Facility: HOSPITAL | Age: 50
End: 2025-07-08
Payer: COMMERCIAL

## 2025-07-08 DIAGNOSIS — R10.9 FLANK PAIN: ICD-10-CM

## 2025-07-08 DIAGNOSIS — E55.9 VITAMIN D DEFICIENCY: ICD-10-CM

## 2025-07-08 DIAGNOSIS — I48.91 NEW ONSET ATRIAL FIBRILLATION: ICD-10-CM

## 2025-07-08 DIAGNOSIS — R73.01 IMPAIRED FASTING GLUCOSE: ICD-10-CM

## 2025-07-08 DIAGNOSIS — N12 PYELONEPHRITIS: Primary | ICD-10-CM

## 2025-07-08 DIAGNOSIS — E03.9 HYPOTHYROIDISM, ACQUIRED: Primary | ICD-10-CM

## 2025-07-08 LAB
ALBUMIN SERPL-MCNC: 4 G/DL (ref 3.5–5.2)
ALBUMIN/GLOB SERPL: 1.3 G/DL
ALP SERPL-CCNC: 82 U/L (ref 39–117)
ALT SERPL W P-5'-P-CCNC: 19 U/L (ref 1–33)
ANION GAP SERPL CALCULATED.3IONS-SCNC: 12.1 MMOL/L (ref 5–15)
AST SERPL-CCNC: 26 U/L (ref 1–32)
BACTERIA UR QL AUTO: ABNORMAL /HPF
BASOPHILS # BLD AUTO: 0.02 10*3/MM3 (ref 0–0.2)
BASOPHILS NFR BLD AUTO: 0.2 % (ref 0–1.5)
BILIRUB SERPL-MCNC: 0.4 MG/DL (ref 0–1.2)
BILIRUB UR QL STRIP: NEGATIVE
BUN SERPL-MCNC: 12 MG/DL (ref 6–20)
BUN/CREAT SERPL: 16.7 (ref 7–25)
CALCIUM SPEC-SCNC: 9.1 MG/DL (ref 8.6–10.5)
CHLORIDE SERPL-SCNC: 101 MMOL/L (ref 98–107)
CLARITY UR: CLEAR
CO2 SERPL-SCNC: 18.9 MMOL/L (ref 22–29)
COLOR UR: YELLOW
CREAT SERPL-MCNC: 0.72 MG/DL (ref 0.57–1)
D-LACTATE SERPL-SCNC: 0.9 MMOL/L (ref 0.5–2)
DEPRECATED RDW RBC AUTO: 42.2 FL (ref 37–54)
EGFRCR SERPLBLD CKD-EPI 2021: 101.4 ML/MIN/1.73
EOSINOPHIL # BLD AUTO: 0.02 10*3/MM3 (ref 0–0.4)
EOSINOPHIL NFR BLD AUTO: 0.2 % (ref 0.3–6.2)
ERYTHROCYTE [DISTWIDTH] IN BLOOD BY AUTOMATED COUNT: 13.1 % (ref 12.3–15.4)
GLOBULIN UR ELPH-MCNC: 3.1 GM/DL
GLUCOSE SERPL-MCNC: 108 MG/DL (ref 65–99)
GLUCOSE UR STRIP-MCNC: NEGATIVE MG/DL
HCG SERPL QL: NEGATIVE
HCT VFR BLD AUTO: 32.7 % (ref 34–46.6)
HGB BLD-MCNC: 10.8 G/DL (ref 12–15.9)
HGB UR QL STRIP.AUTO: ABNORMAL
HOLD SPECIMEN: NORMAL
HYALINE CASTS UR QL AUTO: ABNORMAL /LPF
IMM GRANULOCYTES # BLD AUTO: 0.04 10*3/MM3 (ref 0–0.05)
IMM GRANULOCYTES NFR BLD AUTO: 0.5 % (ref 0–0.5)
KETONES UR QL STRIP: ABNORMAL
LEUKOCYTE ESTERASE UR QL STRIP.AUTO: ABNORMAL
LIPASE SERPL-CCNC: 20 U/L (ref 13–60)
LYMPHOCYTES # BLD AUTO: 0.83 10*3/MM3 (ref 0.7–3.1)
LYMPHOCYTES NFR BLD AUTO: 9.4 % (ref 19.6–45.3)
MCH RBC QN AUTO: 29.2 PG (ref 26.6–33)
MCHC RBC AUTO-ENTMCNC: 33 G/DL (ref 31.5–35.7)
MCV RBC AUTO: 88.4 FL (ref 79–97)
MONOCYTES # BLD AUTO: 1.14 10*3/MM3 (ref 0.1–0.9)
MONOCYTES NFR BLD AUTO: 12.9 % (ref 5–12)
NEUTROPHILS NFR BLD AUTO: 6.79 10*3/MM3 (ref 1.7–7)
NEUTROPHILS NFR BLD AUTO: 76.8 % (ref 42.7–76)
NITRITE UR QL STRIP: NEGATIVE
NRBC BLD AUTO-RTO: 0 /100 WBC (ref 0–0.2)
PH UR STRIP.AUTO: 6 [PH] (ref 5–8)
PLATELET # BLD AUTO: 232 10*3/MM3 (ref 140–450)
PMV BLD AUTO: 8.6 FL (ref 6–12)
POTASSIUM SERPL-SCNC: 3.6 MMOL/L (ref 3.5–5.2)
PROT SERPL-MCNC: 7.1 G/DL (ref 6–8.5)
PROT UR QL STRIP: ABNORMAL
RBC # BLD AUTO: 3.7 10*6/MM3 (ref 3.77–5.28)
RBC # UR STRIP: ABNORMAL /HPF
REF LAB TEST METHOD: ABNORMAL
SODIUM SERPL-SCNC: 132 MMOL/L (ref 136–145)
SP GR UR STRIP: 1.02 (ref 1–1.03)
SQUAMOUS #/AREA URNS HPF: ABNORMAL /HPF
UROBILINOGEN UR QL STRIP: ABNORMAL
WBC # UR STRIP: ABNORMAL /HPF
WBC NRBC COR # BLD AUTO: 8.84 10*3/MM3 (ref 3.4–10.8)
WHOLE BLOOD HOLD COAG: NORMAL
WHOLE BLOOD HOLD SPECIMEN: NORMAL

## 2025-07-08 PROCEDURE — 25010000002 ONDANSETRON PER 1 MG: Performed by: EMERGENCY MEDICINE

## 2025-07-08 PROCEDURE — G0378 HOSPITAL OBSERVATION PER HR: HCPCS

## 2025-07-08 PROCEDURE — 83605 ASSAY OF LACTIC ACID: CPT

## 2025-07-08 PROCEDURE — 87040 BLOOD CULTURE FOR BACTERIA: CPT

## 2025-07-08 PROCEDURE — 25810000003 LACTATED RINGERS SOLUTION: Performed by: EMERGENCY MEDICINE

## 2025-07-08 PROCEDURE — 96361 HYDRATE IV INFUSION ADD-ON: CPT

## 2025-07-08 PROCEDURE — 83690 ASSAY OF LIPASE: CPT

## 2025-07-08 PROCEDURE — 80053 COMPREHEN METABOLIC PANEL: CPT

## 2025-07-08 PROCEDURE — 25810000003 SODIUM CHLORIDE 0.9 % SOLUTION: Performed by: PHYSICIAN ASSISTANT

## 2025-07-08 PROCEDURE — 96375 TX/PRO/DX INJ NEW DRUG ADDON: CPT

## 2025-07-08 PROCEDURE — 25010000002 DIPHENHYDRAMINE PER 50 MG

## 2025-07-08 PROCEDURE — 81001 URINALYSIS AUTO W/SCOPE: CPT

## 2025-07-08 PROCEDURE — 25010000002 PROCHLORPERAZINE 10 MG/2ML SOLUTION

## 2025-07-08 PROCEDURE — 36415 COLL VENOUS BLD VENIPUNCTURE: CPT

## 2025-07-08 PROCEDURE — 25510000001 IOPAMIDOL 61 % SOLUTION: Performed by: EMERGENCY MEDICINE

## 2025-07-08 PROCEDURE — 87086 URINE CULTURE/COLONY COUNT: CPT

## 2025-07-08 PROCEDURE — 74177 CT ABD & PELVIS W/CONTRAST: CPT

## 2025-07-08 PROCEDURE — 85025 COMPLETE CBC W/AUTO DIFF WBC: CPT

## 2025-07-08 PROCEDURE — 84703 CHORIONIC GONADOTROPIN ASSAY: CPT

## 2025-07-08 PROCEDURE — 25010000002 CEFTRIAXONE PER 250 MG

## 2025-07-08 PROCEDURE — 99285 EMERGENCY DEPT VISIT HI MDM: CPT

## 2025-07-08 PROCEDURE — 25010000002 HYDROMORPHONE PER 4 MG: Performed by: EMERGENCY MEDICINE

## 2025-07-08 RX ORDER — SODIUM CHLORIDE 9 MG/ML
40 INJECTION, SOLUTION INTRAVENOUS AS NEEDED
Status: DISCONTINUED | OUTPATIENT
Start: 2025-07-08 | End: 2025-07-09 | Stop reason: HOSPADM

## 2025-07-08 RX ORDER — BISACODYL 5 MG/1
5 TABLET, DELAYED RELEASE ORAL DAILY PRN
Status: DISCONTINUED | OUTPATIENT
Start: 2025-07-08 | End: 2025-07-09 | Stop reason: HOSPADM

## 2025-07-08 RX ORDER — SODIUM CHLORIDE 0.9 % (FLUSH) 0.9 %
10 SYRINGE (ML) INJECTION AS NEEDED
Status: DISCONTINUED | OUTPATIENT
Start: 2025-07-08 | End: 2025-07-09 | Stop reason: HOSPADM

## 2025-07-08 RX ORDER — PROGESTERONE 100 MG/1
100 CAPSULE ORAL DAILY
Status: DISCONTINUED | OUTPATIENT
Start: 2025-07-09 | End: 2025-07-09 | Stop reason: HOSPADM

## 2025-07-08 RX ORDER — SODIUM CHLORIDE 9 MG/ML
125 INJECTION, SOLUTION INTRAVENOUS CONTINUOUS
Status: ACTIVE | OUTPATIENT
Start: 2025-07-08 | End: 2025-07-09

## 2025-07-08 RX ORDER — ONDANSETRON 4 MG/1
4 TABLET, ORALLY DISINTEGRATING ORAL EVERY 6 HOURS PRN
Status: DISCONTINUED | OUTPATIENT
Start: 2025-07-08 | End: 2025-07-09 | Stop reason: HOSPADM

## 2025-07-08 RX ORDER — METOPROLOL SUCCINATE 25 MG/1
25 TABLET, EXTENDED RELEASE ORAL DAILY
Status: DISCONTINUED | OUTPATIENT
Start: 2025-07-09 | End: 2025-07-09 | Stop reason: HOSPADM

## 2025-07-08 RX ORDER — IOPAMIDOL 612 MG/ML
85 INJECTION, SOLUTION INTRAVASCULAR
Status: COMPLETED | OUTPATIENT
Start: 2025-07-08 | End: 2025-07-08

## 2025-07-08 RX ORDER — HYDROMORPHONE HYDROCHLORIDE 1 MG/ML
0.5 INJECTION, SOLUTION INTRAMUSCULAR; INTRAVENOUS; SUBCUTANEOUS ONCE
Status: COMPLETED | OUTPATIENT
Start: 2025-07-08 | End: 2025-07-08

## 2025-07-08 RX ORDER — AMOXICILLIN 250 MG
2 CAPSULE ORAL 2 TIMES DAILY PRN
Status: DISCONTINUED | OUTPATIENT
Start: 2025-07-08 | End: 2025-07-09 | Stop reason: HOSPADM

## 2025-07-08 RX ORDER — BISACODYL 10 MG
10 SUPPOSITORY, RECTAL RECTAL DAILY PRN
Status: DISCONTINUED | OUTPATIENT
Start: 2025-07-08 | End: 2025-07-09 | Stop reason: HOSPADM

## 2025-07-08 RX ORDER — NITROGLYCERIN 0.4 MG/1
0.4 TABLET SUBLINGUAL
Status: DISCONTINUED | OUTPATIENT
Start: 2025-07-08 | End: 2025-07-09 | Stop reason: HOSPADM

## 2025-07-08 RX ORDER — ONDANSETRON 2 MG/ML
4 INJECTION INTRAMUSCULAR; INTRAVENOUS EVERY 6 HOURS PRN
Status: DISCONTINUED | OUTPATIENT
Start: 2025-07-08 | End: 2025-07-09 | Stop reason: HOSPADM

## 2025-07-08 RX ORDER — SODIUM CHLORIDE 0.9 % (FLUSH) 0.9 %
10 SYRINGE (ML) INJECTION EVERY 12 HOURS SCHEDULED
Status: DISCONTINUED | OUTPATIENT
Start: 2025-07-08 | End: 2025-07-09 | Stop reason: HOSPADM

## 2025-07-08 RX ORDER — LEVOTHYROXINE SODIUM 25 UG/1
25 TABLET ORAL DAILY
Status: DISCONTINUED | OUTPATIENT
Start: 2025-07-09 | End: 2025-07-09 | Stop reason: HOSPADM

## 2025-07-08 RX ORDER — ONDANSETRON 2 MG/ML
4 INJECTION INTRAMUSCULAR; INTRAVENOUS ONCE
Status: COMPLETED | OUTPATIENT
Start: 2025-07-08 | End: 2025-07-08

## 2025-07-08 RX ORDER — POLYETHYLENE GLYCOL 3350 17 G/17G
17 POWDER, FOR SOLUTION ORAL DAILY PRN
Status: DISCONTINUED | OUTPATIENT
Start: 2025-07-08 | End: 2025-07-09 | Stop reason: HOSPADM

## 2025-07-08 RX ORDER — ACETAMINOPHEN 500 MG
1000 TABLET ORAL ONCE
Status: COMPLETED | OUTPATIENT
Start: 2025-07-08 | End: 2025-07-08

## 2025-07-08 RX ORDER — DIPHENHYDRAMINE HYDROCHLORIDE 50 MG/ML
25 INJECTION, SOLUTION INTRAMUSCULAR; INTRAVENOUS ONCE
Status: COMPLETED | OUTPATIENT
Start: 2025-07-08 | End: 2025-07-08

## 2025-07-08 RX ORDER — PROCHLORPERAZINE EDISYLATE 5 MG/ML
10 INJECTION INTRAMUSCULAR; INTRAVENOUS ONCE
Status: COMPLETED | OUTPATIENT
Start: 2025-07-08 | End: 2025-07-08

## 2025-07-08 RX ADMIN — ONDANSETRON 4 MG: 2 INJECTION, SOLUTION INTRAMUSCULAR; INTRAVENOUS at 16:56

## 2025-07-08 RX ADMIN — CEFTRIAXONE SODIUM 2000 MG: 2 INJECTION, POWDER, FOR SOLUTION INTRAMUSCULAR; INTRAVENOUS at 18:32

## 2025-07-08 RX ADMIN — ACETAMINOPHEN 1000 MG: 500 TABLET, FILM COATED ORAL at 16:53

## 2025-07-08 RX ADMIN — SODIUM CHLORIDE 125 ML/HR: 9 INJECTION, SOLUTION INTRAVENOUS at 23:00

## 2025-07-08 RX ADMIN — IOPAMIDOL 85 ML: 612 INJECTION, SOLUTION INTRAVENOUS at 16:20

## 2025-07-08 RX ADMIN — SODIUM CHLORIDE, POTASSIUM CHLORIDE, SODIUM LACTATE AND CALCIUM CHLORIDE 1000 ML: 600; 310; 30; 20 INJECTION, SOLUTION INTRAVENOUS at 16:09

## 2025-07-08 RX ADMIN — Medication 10 ML: at 21:26

## 2025-07-08 RX ADMIN — SODIUM CHLORIDE 1000 ML: 9 INJECTION, SOLUTION INTRAVENOUS at 18:33

## 2025-07-08 RX ADMIN — HYDROMORPHONE HYDROCHLORIDE 0.5 MG: 1 INJECTION, SOLUTION INTRAMUSCULAR; INTRAVENOUS; SUBCUTANEOUS at 16:56

## 2025-07-08 RX ADMIN — PROCHLORPERAZINE EDISYLATE 10 MG: 5 INJECTION INTRAMUSCULAR; INTRAVENOUS at 23:00

## 2025-07-08 RX ADMIN — DIPHENHYDRAMINE HYDROCHLORIDE 25 MG: 50 INJECTION, SOLUTION INTRAMUSCULAR; INTRAVENOUS at 23:00

## 2025-07-08 NOTE — H&P
[facility]   HISTORY AND PHYSICAL    Patient Name: Mary Alves  : 1975  MRN: [id number]  Primary Care Physician:  Rosita Scott PA-C  Date of admission: 2025    Patient Care Team:  Rosita Scott PA-C as PCP - General (Family Medicine)  Dandre Varela MD as Consulting Physician (Gastroenterology)  Vinh Martinez MD as Surgeon (Hand Surgery)  Chetan Kim MD as Consulting Physician (Obstetrics and Gynecology)  Arnel Cee MD as Consulting Physician (Cardiology)  Adrian Bustillo MD as Surgeon (General Surgery)  Maverick Wright MD as Consulting Physician (Orthopedic Surgery)  Williams Miles MD as Consulting Physician (Orthopedic Surgery)       Subjective   Subjective     Chief Complaint:   Chief Complaint   Patient presents with    Fever    Abdominal Pain         HPI:    Mary Alves is a 50 y.o. female, with past medical history including, but not limited to, atrial fibrillation, and hypothyroidism, was admitted to the observation unit with a diagnosis of pyelonephritis.  Patient states that she has had generalized bodyaches, low-grade fever, dysuria over the past 2 weeks.  She states that she really did not think anything about it until she took her temperature today and it was 103.2.  In the emergency department she was given 2 L of IV fluids, and 2 g of Rocephin IV.  Rocephin will be continued while in the observation unit.  She denies any CVA tenderness.    Review of Systems   All systems were reviewed and negative except for: What was mentioned above in the HPI.    Personal History     Past Medical History:   Diagnosis Date    Abnormal ECG 2021    Right bundle branch block    Asthma     Breast mass     Benign tumor removed    Colon polyp 2021    4 benign polyps removed    Hypothyroidism     Lipoma     ANTERIOR NECK    Ovarian cyst     PONV (postoperative nausea and vomiting)     Slow to wake up after anesthesia        Past Surgical History:    Procedure Laterality Date    AUGMENTATION MAMMAPLASTY      BLADDER REPAIR      BLADDER SLING    BREAST AUGMENTATION  2014    Rupture implant 2020 revision done    BREAST BIOPSY       SECTION      Twins    COLONOSCOPY      ELBOW PROCEDURE Right 2022    ENDOSCOPY N/A 2022    Procedure: ESOPHAGOGASTRODUODENOSCOPY WITH BIOPSY;  Surgeon: Dandre Varela MD;  [place] [facility] [place]  Service: Gastroenterology;  Laterality: N/A;  MILD GASTRITIS    EXCISION MASS HEAD/NECK N/A 2022    Procedure: Excision anterior neck lipoma;  Surgeon: Jolanta Villalpando MD;  Location: [facility] MAIN OR;  Service: General;  Laterality: N/A;    FRACTURE SURGERY  1986    Right elbow    KNEE SURGERY Right     OOPHORECTOMY         Family History: family history includes Arthritis in her mother; Brain cancer in her father; Colon cancer in her mother; Colon polyps in her mother; Depression in her mother; Heart attack in her father and mother; Heart disease in her mother. Otherwise pertinent FHx was reviewed and not pertinent to current issue.    Social History:  reports that she has never smoked. She has never been exposed to tobacco smoke. She has never used smokeless tobacco. She reports current alcohol use. She reports that she does not use drugs.    Home Medications:  Estradiol, Omega-3 Fatty Acids, Progesterone, desvenlafaxine, famotidine, ibuprofen, levothyroxine, metoprolol succinate XL, multivitamin with minerals, naproxen, nitrofurantoin (macrocrystal-monohydrate), ondansetron ODT, and rosuvastatin    Allergies:  No Known Allergies    Objective   Objective     Vitals:   Temp:  [98.5 °F (36.9 °C)-101 °F (38.3 °C)] 98.5 °F (36.9 °C)  Heart Rate:  [] 62  Resp:  [18] 18  BP: (114-119)/(67-74) 115/74  Physical Exam    Constitutional: Awake, alert   Eyes: PERRLA, sclerae anicteric, no conjunctival injection   HENT: NCAT, mucous membranes moist   Neck: Supple, no thyromegaly, no lymphadenopathy, trachea  midline   Respiratory: Clear to auscultation bilaterally, nonlabored respirations    Cardiovascular: RRR, no murmurs, rubs, or gallops, palpable pedal pulses bilaterally   Gastrointestinal: Positive bowel sounds, soft, nontender, nondistended   Musculoskeletal: No bilateral ankle edema, no clubbing or cyanosis to extremities   Psychiatric: Appropriate affect, cooperative   Neurologic: Oriented x 3, strength symmetric in all extremities, Cranial Nerves grossly intact to confrontation, speech clear   Skin: No rashes     Result Review    Result Review:  I have personally reviewed the results from the time of this admission to 7/8/2025 19:31 EDT and agree with these findings:  [x]  Laboratory list / accordion  []  Microbiology  [x]  Radiology  []  EKG/Telemetry   []  Cardiology/Vascular   []  Pathology  [x]  Old records  []  Other:      Sodium level 132,, glucose 108, hemoglobin 10.8, hematocrit 33.7, all of the blood work is at baseline for the patient.  Urinalysis shows 2+ ketones, 1+ blood, moderate leukocytes, 2+ protein, 11-20 RBCs, too numerous to count WBCs, and 3-6 epithelials.  Urine culture is pending at this time.  Blood cultures are also pending.  CT abdomen pelvis with contrast shows findings of left pyelonephritis.    The ASCVD Risk score (Mary DK, et al., 2019) failed to calculate for the following reasons:    The valid HDL cholesterol range is 20 to 100 mg/dL    The valid total cholesterol range is 130 to 320 mg/dL     Assessment & Plan   Assessment / Plan     Brief Patient Summary:  Mary Alves is a 50 y.o. female who was admitted to the observation unit for further evaluation and treatment of her pyelonephritis.  We will continue Rocephin 2 g IV every 24 hours.    Active Hospital Problems:  Active Hospital Problems    Diagnosis     **Pyelonephritis      Plan:     Pyelonephritis  - Cardiac monitoring  - Vital signs every 4 hours  - Continuous pulse ox  - Rocephin 2 g IV every 24 hours  - Has  received 2 units IV fluids in ED  - Pain control  - Repeat labs in a.m.    Hypothyroidism  - Continue home dose Synthroid    Atrial fibrillation  - Continue metoprolol 25 mg p.o. every 24 hours    VTE Prophylaxis:  Mechanical VTE prophylaxis orders are present.        CODE STATUS:    Code Status (Patient has no pulse and is not breathing): CPR (Attempt to Resuscitate)  Medical Interventions (Patient has pulse or is breathing): Full Support  Level Of Support Discussed With: Patient    Admission Status:  I believe this patient meets observation status.    75 minutes have been spent by [facility] providers in the care of this patient while under observation status on 07/08/25 .      Appropriate PPE worn during patient encounter.  Hand hygeine performed before and after seeing the patient.      Electronically signed by VANCE Smith, 07/08/25, 7:31 PM EDT.

## 2025-07-08 NOTE — ED PROVIDER NOTES
EMERGENCY DEPARTMENT ENCOUNTER    Room Number:  111/1  Date of encounter:  7/8/2025  PCP: Rosita Scott PA-C  Historian: Patient  Full history not obtainable due to: None    HPI:  Chief Complaint: Left-sided flank pain    Context: Mary Alves is a 50 y.o. female with a PMH significant for abdominal pain, hypothyroidism, asthma, colon polyp, ovarian cyst, who presents to the ED c/o left-sided flank pain.  Patient says that she went on a cruise in late June, which came back in the 23rd she began to develop some left-sided flank pain and some dysuria.  Patient says that she has frequent UTIs and this episode did not seem similar.  Patient says that the flank pain has continued for the past 2 weeks, is intermittent with some radiation into her left upper quadrant of her abdomen.  Patient also notes some chills, says that she has been very cold at nighttime.  Pain is currently a 6 out of 10.  Also admits to some intermittent pain in her left upper quadrant, she says this has been a somewhat chronic pain but is worsened in the past 2 weeks.     Patient also endorses headache, some nausea with no vomiting  Of note, patient was brought to the hospital yesterday, was in A-fib with RVR, says that the first time that this has happened to her        MEDICAL RECORD REVIEW:    Upon review of the medical record it appears the patient was evaluated 5/12/2025.  The patient had a normal ferritin and folate.    PAST MEDICAL HISTORY    Active Ambulatory Problems     Diagnosis Date Noted    Abdominal pain 03/04/2022    Abnormal CT of the abdomen 03/29/2022    Hypothyroidism, acquired 05/26/2022    Subcutaneous mass of neck 07/14/2022     Resolved Ambulatory Problems     Diagnosis Date Noted    No Resolved Ambulatory Problems     Past Medical History:   Diagnosis Date    Abnormal ECG 11/2021    Asthma     Breast mass 1997    Colon polyp 9/2021    Hypothyroidism     Lipoma     Ovarian cyst     PONV (postoperative nausea and  vomiting)     Slow to wake up after anesthesia          PAST SURGICAL HISTORY  Past Surgical History:   Procedure Laterality Date    AUGMENTATION MAMMAPLASTY      BLADDER REPAIR      BLADDER SLING    BREAST AUGMENTATION  2014    Rupture implant 2020 revision done    BREAST BIOPSY       SECTION      Twins    COLONOSCOPY      ELBOW PROCEDURE Right 2022    ENDOSCOPY N/A 2022    Procedure: ESOPHAGOGASTRODUODENOSCOPY WITH BIOPSY;  Surgeon: Dandre Varela MD;  [place] [facility] [place];  Service: Gastroenterology;  Laterality: N/A;  MILD GASTRITIS    EXCISION MASS HEAD/NECK N/A 2022    Procedure: Excision anterior neck lipoma;  Surgeon: Jolanta Villalpando MD;  Location: [facility] MAIN OR;  Service: General;  Laterality: N/A;    FRACTURE SURGERY  1986    Right elbow    KNEE SURGERY Right     OOPHORECTOMY           FAMILY HISTORY  Family History   Problem Relation Age of Onset    Colon polyps Mother     Heart attack Mother     Colon cancer Mother     Arthritis Mother     Depression Mother     Heart disease Mother          from anterior lateral MI    Brain cancer Father     Heart attack Father     Crohn's disease Neg Hx     Irritable bowel syndrome Neg Hx     Ulcerative colitis Neg Hx     Malig Hyperthermia Neg Hx          SOCIAL HISTORY  Social History     Socioeconomic History    Marital status:    Tobacco Use    Smoking status: Never     Passive exposure: Never    Smokeless tobacco: Never   Vaping Use    Vaping status: Never Used   Substance and Sexual Activity    Alcohol use: Yes     Comment: occasionally    Drug use: No    Sexual activity: Yes     Partners: Male     Birth control/protection: Condom, None         ALLERGIES  Patient has no known allergies.        REVIEW OF SYSTEMS    All systems reviewed and marked as negative except as listed in HPI     PHYSICAL EXAM    I have reviewed the triage vital signs and nursing notes.    ED Triage Vitals   Temp Heart Rate Resp BP SpO2   25  1453 07/08/25 1453 07/08/25 1453 07/08/25 1454 07/08/25 1453   (!) 101 °F (38.3 °C) 107 18 119/73 100 %      Temp src Heart Rate Source Patient Position BP Location FiO2 (%)   07/08/25 1603 -- -- -- --   Oral           Physical Exam  Constitutional:       General: She is not in acute distress.     Appearance: Normal appearance. She is not ill-appearing.   HENT:      Head: Normocephalic and atraumatic.      Nose: Nose normal.   Eyes:      Extraocular Movements: Extraocular movements intact.      Pupils: Pupils are equal, round, and reactive to light.   Cardiovascular:      Rate and Rhythm: Normal rate and regular rhythm.      Pulses: Normal pulses.      Heart sounds: Normal heart sounds.   Pulmonary:      Effort: Pulmonary effort is normal. No respiratory distress.      Breath sounds: Normal breath sounds.   Abdominal:      General: Abdomen is flat.      Palpations: Abdomen is soft.      Tenderness: There is abdominal tenderness in the left upper quadrant. There is left CVA tenderness.   Skin:     General: Skin is warm and dry.      Coloration: Skin is not jaundiced.   Neurological:      Mental Status: She is alert and oriented to person, place, and time.   Psychiatric:         Mood and Affect: Mood normal.         Behavior: Behavior normal.         Thought Content: Thought content normal.         Vital signs and nursing notes reviewed.            LAB RESULTS  Recent Results (from the past 24 hours)   Comprehensive Metabolic Panel    Collection Time: 07/08/25  3:08 PM    Specimen: Arm, Left; Blood   Result Value Ref Range    Glucose 108 (H) 65 - 99 mg/dL    BUN 12.0 6.0 - 20.0 mg/dL    Creatinine 0.72 0.57 - 1.00 mg/dL    Sodium 132 (L) 136 - 145 mmol/L    Potassium 3.6 3.5 - 5.2 mmol/L    Chloride 101 98 - 107 mmol/L    CO2 18.9 (L) 22.0 - 29.0 mmol/L    Calcium 9.1 8.6 - 10.5 mg/dL    Total Protein 7.1 6.0 - 8.5 g/dL    Albumin 4.0 3.5 - 5.2 g/dL    ALT (SGPT) 19 1 - 33 U/L    AST (SGOT) 26 1 - 32 U/L    Alkaline  Phosphatase 82 39 - 117 U/L    Total Bilirubin 0.4 0.0 - 1.2 mg/dL    Globulin 3.1 gm/dL    A/G Ratio 1.3 g/dL    BUN/Creatinine Ratio 16.7 7.0 - 25.0    Anion Gap 12.1 5.0 - 15.0 mmol/L    eGFR 101.4 >60.0 mL/min/1.73   Lipase    Collection Time: 07/08/25  3:08 PM    Specimen: Arm, Left; Blood   Result Value Ref Range    Lipase 20 13 - 60 U/L   Lactic Acid, Plasma    Collection Time: 07/08/25  3:08 PM    Specimen: Arm, Left; Blood   Result Value Ref Range    Lactate 0.9 0.5 - 2.0 mmol/L   hCG, Serum, Qualitative    Collection Time: 07/08/25  3:08 PM    Specimen: Arm, Left; Blood   Result Value Ref Range    HCG Qualitative Negative Negative   Green Top (Gel)    Collection Time: 07/08/25  3:08 PM   Result Value Ref Range    Extra Tube Hold for add-ons.    Lavender Top    Collection Time: 07/08/25  3:08 PM   Result Value Ref Range    Extra Tube hold for add-on    Light Blue Top    Collection Time: 07/08/25  3:08 PM   Result Value Ref Range    Extra Tube Hold for add-ons.    CBC Auto Differential    Collection Time: 07/08/25  3:08 PM    Specimen: Arm, Left; Blood   Result Value Ref Range    WBC 8.84 3.40 - 10.80 10*3/mm3    RBC 3.70 (L) 3.77 - 5.28 10*6/mm3    Hemoglobin 10.8 (L) 12.0 - 15.9 g/dL    Hematocrit 32.7 (L) 34.0 - 46.6 %    MCV 88.4 79.0 - 97.0 fL    MCH 29.2 26.6 - 33.0 pg    MCHC 33.0 31.5 - 35.7 g/dL    RDW 13.1 12.3 - 15.4 %    RDW-SD 42.2 37.0 - 54.0 fl    MPV 8.6 6.0 - 12.0 fL    Platelets 232 140 - 450 10*3/mm3    Neutrophil % 76.8 (H) 42.7 - 76.0 %    Lymphocyte % 9.4 (L) 19.6 - 45.3 %    Monocyte % 12.9 (H) 5.0 - 12.0 %    Eosinophil % 0.2 (L) 0.3 - 6.2 %    Basophil % 0.2 0.0 - 1.5 %    Immature Grans % 0.5 0.0 - 0.5 %    Neutrophils, Absolute 6.79 1.70 - 7.00 10*3/mm3    Lymphocytes, Absolute 0.83 0.70 - 3.10 10*3/mm3    Monocytes, Absolute 1.14 (H) 0.10 - 0.90 10*3/mm3    Eosinophils, Absolute 0.02 0.00 - 0.40 10*3/mm3    Basophils, Absolute 0.02 0.00 - 0.20 10*3/mm3    Immature Grans, Absolute  0.04 0.00 - 0.05 10*3/mm3    nRBC 0.0 0.0 - 0.2 /100 WBC   Urinalysis With Microscopic If Indicated (No Culture) - Urine, Clean Catch    Collection Time: 07/08/25  3:18 PM    Specimen: Urine, Clean Catch   Result Value Ref Range    Color, UA Yellow Yellow, Straw    Appearance, UA Clear Clear    pH, UA 6.0 5.0 - 8.0    Specific Gravity, UA 1.024 1.005 - 1.030    Glucose, UA Negative Negative    Ketones, UA 40 mg/dL (2+) (A) Negative    Bilirubin, UA Negative Negative    Blood, UA Small (1+) (A) Negative    Protein,  mg/dL (2+) (A) Negative    Leuk Esterase, UA Moderate (2+) (A) Negative    Nitrite, UA Negative Negative    Urobilinogen, UA 1.0 E.U./dL 0.2 - 1.0 E.U./dL   Urinalysis, Microscopic Only - Urine, Clean Catch    Collection Time: 07/08/25  3:18 PM    Specimen: Urine, Clean Catch   Result Value Ref Range    RBC, UA 11-20 (A) None Seen, 0-2 /HPF    WBC, UA Too Numerous to Count (A) None Seen, 0-2 /HPF    Bacteria, UA None Seen None Seen /HPF    Squamous Epithelial Cells, UA 3-6 (A) None Seen, 0-2 /HPF    Hyaline Casts, UA 3-6 None Seen /LPF    Methodology Automated Microscopy        Ordered the above labs and independently reviewed the results.        RADIOLOGY  CT Abdomen Pelvis With Contrast  Result Date: 7/8/2025  CT ABDOMEN PELVIS W CONTRAST-  INDICATIONS: Left flank pain  TECHNIQUE: Radiation dose reduction techniques were utilized, including automated exposure control and exposure modulation based on body size. Enhanced ABDOMEN AND PELVIS CT  COMPARISON: 5/4/2025  FINDINGS:  New from the prior exam, multifocal ill-defined hypodensities in the left kidney, predominant at the left mid to upper kidney are compatible with pyelonephritis, follow-up recommended to exclude the possibility of an infiltrative process. Punctate nonobstructive left nephrolithiasis. No hydronephrosis. Mildly dilated right extrarenal pelvis.  The urinary bladder is mostly empty, limiting its assessment.  Otherwise unremarkable  appearance of the liver, gallbladder, spleen, adrenal glands, pancreas, kidneys, bladder.  No bowel obstruction or abnormal bowel thickening is identified. Tiny appendicolithiasis is suggested, but the appendix does not appear inflamed.  No free intraperitoneal gas or free fluid.  Scattered small mesenteric and para-aortic lymph nodes are seen that are not significant by size criteria.  Abdominal aorta is not aneurysmal.    The lung bases show minimal atelectasis/scarring.  Degenerative changes are seen in the spine. No acute fracture is identified.         Findings of left pyelonephritis, follow-up recommended.  This report was finalized on 7/8/2025 5:10 PM by Dr. José Elizabeth M.D on Workstation: [id number]        I ordered the above noted radiological studies. Independently reviewed by me and discussed with radiologist.  See dictation above for official radiology interpretation.      PROCEDURES    Procedures        MEDICATIONS GIVEN IN ER    Medications   sodium chloride 0.9 % flush 10 mL (has no administration in time range)   sodium chloride 0.9 % flush 10 mL (10 mL Intravenous Given 7/8/25 2126)   sodium chloride 0.9 % flush 10 mL (has no administration in time range)   sodium chloride 0.9 % infusion 40 mL (has no administration in time range)   ondansetron ODT (ZOFRAN-ODT) disintegrating tablet 4 mg (has no administration in time range)     Or   ondansetron (ZOFRAN) injection 4 mg (has no administration in time range)   nitroglycerin (NITROSTAT) SL tablet 0.4 mg (has no administration in time range)   cefTRIAXone (ROCEPHIN) 2,000 mg in sodium chloride 0.9 % 100 mL MBP (has no administration in time range)   sennosides-docusate (PERICOLACE) 8.6-50 MG per tablet 2 tablet (has no administration in time range)     And   polyethylene glycol (MIRALAX) packet 17 g (has no administration in time range)     And   bisacodyl (DULCOLAX) EC tablet 5 mg (has no administration in time range)     And   bisacodyl (DULCOLAX)  suppository 10 mg (has no administration in time range)   levothyroxine (SYNTHROID, LEVOTHROID) tablet 25 mcg (has no administration in time range)   metoprolol succinate XL (TOPROL-XL) 24 hr tablet 25 mg (has no administration in time range)   Progesterone (PROMETRIUM) capsule 100 mg (has no administration in time range)   sodium chloride 0.9 % infusion (125 mL/hr Intravenous New Bag 7/8/25 2300)   acetaminophen (TYLENOL) tablet 1,000 mg (1,000 mg Oral Given 7/8/25 1653)   lactated ringers bolus 1,000 mL (0 mL Intravenous Stopped 7/8/25 1825)   HYDROmorphone (DILAUDID) injection 0.5 mg (0.5 mg Intravenous Given 7/8/25 1656)   ondansetron (ZOFRAN) injection 4 mg (4 mg Intravenous Given 7/8/25 1656)   iopamidol (ISOVUE-300) 61 % injection 85 mL (85 mL Intravenous Given 7/8/25 1620)   cefTRIAXone (ROCEPHIN) 2,000 mg in sodium chloride 0.9 % 100 mL MBP (2,000 mg Intravenous New Bag 7/8/25 1832)   sodium chloride 0.9 % bolus 1,000 mL (1,000 mL Intravenous New Bag 7/8/25 1833)   prochlorperazine (COMPAZINE) injection 10 mg (10 mg Intravenous Given 7/8/25 2300)   diphenhydrAMINE (BENADRYL) injection 25 mg (25 mg Intravenous Given 7/8/25 2300)         PROGRESS, DATA ANALYSIS, CONSULTS, AND MEDICAL DECISION MAKING    All labs have been independently interpreted by me.  All radiology studies have been interpreted by me.  Discussion below represents my analysis of pertinent findings related to patient's condition, differential diagnosis, treatment plan and final disposition.    CT scan showed evidence of pyelonephritis.  Patient was encouraged to stay in the hospital yesterday due to her episode of SVT, due to this and her pyelonephritis the decision was made for patient to stay in the emergency department observation unit.  Patient is receiving ceftriaxone at this time, pain is controlled in the ED.    - Chronic or social conditions impacting care: Frequent UTIs      DIFFERENTIAL DIAGNOSIS INCLUDE BUT NOT LIMITED TO:  Differential diagnosis includes but is not limited to:  - Hepatobiliary pathology such as cholecystitis, cholangitis, and symptomatic cholelithiasis  - Pancreatitis  - Dyspepsia  - Small bowel obstruction  - Appendicitis  - Diverticulitis  - UTI including pyelonephritis  - Ureteral stone  - Zoster  - Colitis, including infectious and ischemic  - Atypical ACS        Orders placed during this visit:  Orders Placed This Encounter   Procedures    Blood Culture - Blood,    Blood Culture - Blood,    Urine Culture - Urine,    CT Abdomen Pelvis With Contrast    [facility] Draw    Comprehensive Metabolic Panel    Lipase    Lactic Acid, Plasma    Urinalysis With Microscopic If Indicated (No Culture) - Urine, Clean Catch    hCG, Serum, Qualitative    CBC Auto Differential    Urinalysis, Microscopic Only - Urine, Clean Catch    CBC (No Diff)    Basic Metabolic Panel    Diet: Regular/House; Fluid Consistency: Thin (IDDSI 0)    Undress & Gown    Vital Signs    Activity - Bed Rest With Exceptions (Specify)    Advance Diet As Tolerated -    Intake & Output    Weigh Patient    Oral Care    Place Sequential Compression Device    Maintain Sequential Compression Device    Maintain IV Access    Telemetry - Place Orders & Notify Provider of Results When Patient Experiences Acute Chest Pain, Dysrhythmia or Respiratory Distress    May Be Off Telemetry for Tests    Code Status and Medical Interventions: CPR (Attempt to Resuscitate); Full Support    Telemetry Scan    Insert Peripheral IV    Insert Peripheral IV    Initiate Emergency Department Observation Status    CBC & Differential    Green Top (Gel)    Lavender Top    Light Blue Top         ED Course as of 07/08/25 2340   Tue Jul 08, 2025   1602 RBC, UA(!): 11-20 [CV]   1602 WBC, UA(!): Too Numerous to Count [CV]   1602 Lactate: 0.9 [CV]   1717 CT abdomen pelvis concerning for pyelonephritis [CV]   1800 Consulted with TAVIA Garcia with ED obs unit    We discussed the patient's history and  physical exam, along with pertinent lab and imaging findings.  Pt admitted for abx and further treatment   [CV]      ED Course User Index  [CV] Atif Wiseman PA-C       AS OF 23:40 EDT VITALS:    BP - 98/54  HR - 62  TEMP - 98.1 °F (36.7 °C) (Oral)  02 SATS - 99%      No follow-up provider specified.       Medication List      No changes were made to your prescriptions during this visit.         I rechecked the patient.  I discussed the patient's labs, radiology findings (including all incidental findings), diagnosis, and plan for admission. The patient understands and agrees with the plan.    DIAGNOSIS  Final diagnoses:   Pyelonephritis   Flank pain         DISPOSITION  Admit to observation    Pt masked in first look. I wore a surgical mask throughout my encounters with the pt. I performed hand hygiene on entry into the pt room and upon exit.     Dictated utilizing Dragon dictation     Note Disclaimer: At [facility], we believe that sharing information builds trust and better relationships. You are receiving this note because you recently visited [facility]. It is possible you will see health information before a provider has talked with you about it. This kind of information can be easy to misunderstand. To help you fully understand what it means for your health, we urge you to discuss this note with your provider.      Atif Wiseman PA-C  07/08/25 3076

## 2025-07-08 NOTE — ED PROVIDER NOTES
MD ATTESTATION NOTE      Brief HPI: Patient complains of left flank pain, dysuria, muscle aches, and chills for the past 2 weeks.  She denies chest pain, shortness of breath, vomiting, or hematuria.  She was seen here in the ED yesterday for new onset A-fib with RVR.    PHYSICAL EXAM    GENERAL: Awake, alert, and oriented x 3.  Resting comfortably in no acute distress  HENT: nares patent  EYES: no scleral icterus  CV: regular rhythm, normal rate  RESPIRATORY: normal effort, CTAB  ABDOMEN: soft, nondistended, nontender, there is left CVA tenderness  MUSCULOSKELETAL: no deformity, extremities are nontender  NEURO: moves all extremities, follows commands, speech is clear and fluent, no facial droop  PSYCH:  calm, cooperative  SKIN: warm, dry    Vital signs and nursing notes reviewed.        Plan: Patient complains of left flank pain and dysuria.  Initial temperature was 101.  Urinalysis has too numerous to count WBCs but no bacteria.  CT scan shows left-sided pyelonephritis.  White blood cell count, lactic acid, and renal function are normal.  Shared decision making was discussed with the patient and admission was recommended.  She is agreeable with this.  She will be given IV fluids and IV antibiotics.    CT abdomen/pelvis personally interpreted by me.  My personal interpretation is: Lungs bases are clear.  No obstructive uropathy.  There is stranding around the left kidney.  No bowel obstruction    ED Course as of 07/08/25 2255   Tue Jul 08, 2025   1602 RBC, UA(!): 11-20 [CV]   1602 WBC, UA(!): Too Numerous to Count [CV]   1602 Lactate: 0.9 [CV]   1717 CT abdomen pelvis concerning for pyelonephritis [CV]   1800 Consulted with TAVIA Garcia with ED obs unit    We discussed the patient's history and physical exam, along with pertinent lab and imaging findings.  Pt admitted for abx and further treatment   [CV]      ED Course User Index  [CV] Atif Wiseman PA-C Holland, William D, MD  07/08/25  1453

## 2025-07-08 NOTE — PROGRESS NOTES
MD ATTESTATION NOTE    The AUTUM[facility] and I have discussed this patient's history, physical exam, and treatment plan.  I have reviewed the documentation and personally had a face to face interaction with the patient. I affirm the documentation and agree with the treatment and plan.  The attached note describes my personal findings.      I provided a substantive portion of the care of the patient.  I personally performed the physical exam in its entirety, and below are my findings.       Brief HPI: Patient mated with left flank pain chills for the past couple weeks.  No chest pain shortness of breath.  Diagnosed yesterday in the ED with new onset A-fib with RVR.  Found to have pyelonephritis in the ED    PHYSICAL EXAM  ED Triage Vitals   Temp Heart Rate Resp BP SpO2   07/08/25 1453 07/08/25 1453 07/08/25 1453 07/08/25 1454 07/08/25 1453   (!) 101 °F (38.3 °C) 107 18 119/73 100 %      Temp src Heart Rate Source Patient Position BP Location FiO2 (%)   07/08/25 1603 07/08/25 1713 -- -- --   Oral Monitor            GENERAL: no acute distress  HENT: nares patent  EYES: no scleral icterus  CV: regular rhythm, normal rate  RESPIRATORY: normal effort  ABDOMEN: soft  MUSCULOSKELETAL: no deformity  NEURO: alert, moves all extremities, follows commands  PSYCH:  calm, cooperative  SKIN: warm, dry    Vital signs and nursing notes reviewed.        Plan: Continue antibiotics.  Pain and nausea control.  Tylenol for fever.

## 2025-07-08 NOTE — ED TRIAGE NOTES
Pt reports fever, left flank pain, back pain, started over a week ago, last motrin about 30min ago; pt was seen yesterday for afib/rvr, neg flu/covid

## 2025-07-09 ENCOUNTER — READMISSION MANAGEMENT (OUTPATIENT)
Dept: CALL CENTER | Facility: HOSPITAL | Age: 50
End: 2025-07-09
Payer: COMMERCIAL

## 2025-07-09 VITALS
WEIGHT: 162 LBS | OXYGEN SATURATION: 99 % | DIASTOLIC BLOOD PRESSURE: 74 MMHG | HEIGHT: 69 IN | HEART RATE: 68 BPM | TEMPERATURE: 98.8 F | SYSTOLIC BLOOD PRESSURE: 116 MMHG | RESPIRATION RATE: 17 BRPM | BODY MASS INDEX: 23.99 KG/M2

## 2025-07-09 LAB
ANION GAP SERPL CALCULATED.3IONS-SCNC: 13 MMOL/L (ref 5–15)
BUN SERPL-MCNC: 8 MG/DL (ref 6–20)
BUN/CREAT SERPL: 16.7 (ref 7–25)
CALCIUM SPEC-SCNC: 7.9 MG/DL (ref 8.6–10.5)
CHLORIDE SERPL-SCNC: 105 MMOL/L (ref 98–107)
CO2 SERPL-SCNC: 19 MMOL/L (ref 22–29)
CREAT SERPL-MCNC: 0.48 MG/DL (ref 0.57–1)
DEPRECATED RDW RBC AUTO: 42.2 FL (ref 37–54)
EGFRCR SERPLBLD CKD-EPI 2021: 114.8 ML/MIN/1.73
ERYTHROCYTE [DISTWIDTH] IN BLOOD BY AUTOMATED COUNT: 13 % (ref 12.3–15.4)
GLUCOSE SERPL-MCNC: 103 MG/DL (ref 65–99)
HCT VFR BLD AUTO: 28.5 % (ref 34–46.6)
HGB BLD-MCNC: 9.6 G/DL (ref 12–15.9)
MCH RBC QN AUTO: 30 PG (ref 26.6–33)
MCHC RBC AUTO-ENTMCNC: 33.7 G/DL (ref 31.5–35.7)
MCV RBC AUTO: 89.1 FL (ref 79–97)
PLATELET # BLD AUTO: 210 10*3/MM3 (ref 140–450)
PMV BLD AUTO: 9.4 FL (ref 6–12)
POTASSIUM SERPL-SCNC: 3.8 MMOL/L (ref 3.5–5.2)
QT INTERVAL: 268 MS
QT INTERVAL: 364 MS
QTC INTERVAL: 447 MS
QTC INTERVAL: 477 MS
RBC # BLD AUTO: 3.2 10*6/MM3 (ref 3.77–5.28)
SODIUM SERPL-SCNC: 137 MMOL/L (ref 136–145)
WBC NRBC COR # BLD AUTO: 7 10*3/MM3 (ref 3.4–10.8)

## 2025-07-09 PROCEDURE — G0378 HOSPITAL OBSERVATION PER HR: HCPCS

## 2025-07-09 PROCEDURE — 25010000002 CEFTRIAXONE PER 250 MG: Performed by: PHYSICIAN ASSISTANT

## 2025-07-09 PROCEDURE — 85027 COMPLETE CBC AUTOMATED: CPT | Performed by: PHYSICIAN ASSISTANT

## 2025-07-09 PROCEDURE — 96361 HYDRATE IV INFUSION ADD-ON: CPT

## 2025-07-09 PROCEDURE — 80048 BASIC METABOLIC PNL TOTAL CA: CPT | Performed by: PHYSICIAN ASSISTANT

## 2025-07-09 PROCEDURE — 96365 THER/PROPH/DIAG IV INF INIT: CPT

## 2025-07-09 RX ORDER — ACETAMINOPHEN 325 MG/1
650 TABLET ORAL EVERY 6 HOURS PRN
Status: DISCONTINUED | OUTPATIENT
Start: 2025-07-09 | End: 2025-07-09 | Stop reason: HOSPADM

## 2025-07-09 RX ORDER — CEFPODOXIME PROXETIL 200 MG/1
200 TABLET, FILM COATED ORAL EVERY 12 HOURS
Qty: 20 TABLET | Refills: 0 | Status: SHIPPED | OUTPATIENT
Start: 2025-07-09 | End: 2025-07-19

## 2025-07-09 RX ORDER — FLUCONAZOLE 150 MG/1
150 TABLET ORAL ONCE
Qty: 1 TABLET | Refills: 1 | Status: SHIPPED | OUTPATIENT
Start: 2025-07-09 | End: 2025-07-10

## 2025-07-09 RX ADMIN — CEFTRIAXONE SODIUM 2000 MG: 2 INJECTION, POWDER, FOR SOLUTION INTRAMUSCULAR; INTRAVENOUS at 06:18

## 2025-07-09 RX ADMIN — LEVOTHYROXINE SODIUM 25 MCG: 25 TABLET ORAL at 06:19

## 2025-07-09 RX ADMIN — PROGESTERONE 100 MG: 100 CAPSULE ORAL at 08:03

## 2025-07-09 RX ADMIN — METOPROLOL SUCCINATE 25 MG: 25 TABLET, EXTENDED RELEASE ORAL at 08:03

## 2025-07-09 RX ADMIN — ACETAMINOPHEN 325MG 650 MG: 325 TABLET ORAL at 02:18

## 2025-07-09 RX ADMIN — Medication 10 ML: at 08:03

## 2025-07-09 RX ADMIN — ACETAMINOPHEN 325MG 650 MG: 325 TABLET ORAL at 08:03

## 2025-07-09 NOTE — PLAN OF CARE
Goal Outcome Evaluation:                 ALL D/C GOALS MET. IV REMOVED WITH TIP INTACT. D/C INSTRUCTIONS GIVEN, PT VERBALIZED UNDERSTANDING. ALL QUESTIONS ANSWERED, NO CONCERNS NOTED. PT OKAY FOR D/C PER CARE TEAM.

## 2025-07-09 NOTE — PLAN OF CARE
Goal Outcome Evaluation:      Pt had minimal complaints throughout the night. Came in with complaints of left  flank pain that has been persistent for a week.  Pt has been running a fever throughout the night. Positive sepsis screen. A&Ox4. Up ad andrea. Recently diagnosed with A fib with RVR, however, has been NSR with an 8 beat run of SVT at 0440. Continuing IV antibiotics per order.

## 2025-07-09 NOTE — OUTREACH NOTE
Prep Survey      Flowsheet Row Responses   [facility] patient discharged from? [place]   Is LACE score < 7 ? Yes   Eligibility [facility]   Date of Admission 07/08/25   Date of Discharge 07/09/25   Discharge Disposition Home or Self Care   Discharge diagnosis Pyelonephritis   Does the patient have one of the following disease processes/diagnoses(primary or secondary)? Other   Does the patient have Home health ordered? No   Is there a DME ordered? No   Prep survey completed? Yes            GREG HARRY - Registered Nurse

## 2025-07-09 NOTE — DISCHARGE SUMMARY
ED OBSERVATION PROGRESS/DISCHARGE SUMMARY    Date of Admission: 7/8/2025   LOS: 0 days   PCP: Rosita Scott, [facility]    Final Diagnosis pyelonephritis      Subjective     Hospital Outcome: Mary Alves is a 50 y.o. female, with past medical history including, but not limited to, atrial fibrillation, and hypothyroidism, was admitted to the observation unit with a diagnosis of pyelonephritis.  Patient states that she has had generalized bodyaches, low-grade fever, dysuria over the past 2 weeks.  She states that she really did not think anything about it until she took her temperature today and it was 103.2.  In the emergency department she was given 2 L of IV fluids, and 2 g of Rocephin IV.  Rocephin will be continued while in the observation unit.  She denies any CVA tenderness.    7/9/2025: Patient feels better than on admission.  Patient did have a mild temp through the evening but white blood cell count continues improve.  Patient has been treated with Rocephin.  Urine culture still pending at this time but patient would like to discharge home.  No objections since clinically she has started to improve.  Will discharge home on cefpodoxime for 10 days. All labs and imaging findings discussed with patient who is agreeable for discharge at this time.     ROS:  General: no fevers, chills  Respiratory: no cough, dyspnea  Cardiovascular: no chest pain, palpitations  Abdomen: No abdominal pain, nausea, vomiting, or diarrhea  Neurologic: No focal weakness    Objective   Physical Exam:  I have reviewed the vital signs.  Temp:  [98.1 °F (36.7 °C)-101.8 °F (38.8 °C)] 98.8 °F (37.1 °C)  Heart Rate:  [] 79  Resp:  [16-18] 18  BP: ()/(54-74) 111/67  General Appearance:    Alert, cooperative, no distress  Head:    Normocephalic, atraumatic, normal hearing  Eyes:    Sclerae anicteric, EOMI  Neck:   Supple, nontender  Lungs: Clear to auscultation bilaterally, respirations unlabored on RA  Heart: Regular rate and  rhythm, S1 and S2 normal, no murmur  Abdomen:  Soft, nontender, bowel sounds active, nondistended  Extremities: No clubbing, cyanosis, or edema to lower extremities  Pulses:  2+ and symmetric in distal lower extremities  Skin: No rashes   Neurologic: Oriented x3, Normal strength to extremities    Results Review:    I have reviewed the labs, radiology results and diagnostic studies.    Results from last 7 days   Lab Units 07/09/25  0444   WBC 10*3/mm3 7.00   HEMOGLOBIN g/dL 9.6*   HEMATOCRIT % 28.5*   PLATELETS 10*3/mm3 210     Results from last 7 days   Lab Units 07/09/25  0444 07/08/25  1508 07/07/25  1539   SODIUM mmol/L 137 132* 134*   POTASSIUM mmol/L 3.8 3.6 3.4*   CHLORIDE mmol/L 105 101 96*   CO2 mmol/L 19.0* 18.9* 19.9*   BUN mg/dL 8.0 12.0 16.0   CREATININE mg/dL 0.48* 0.72 0.88   CALCIUM mg/dL 7.9* 9.1 9.9   BILIRUBIN mg/dL  --  0.4 0.6   ALK PHOS U/L  --  82 80   ALT (SGPT) U/L  --  19 13   AST (SGOT) U/L  --  26 21   GLUCOSE mg/dL 103* 108* 117*     Imaging Results (Last 24 Hours)       Procedure Component Value Units Date/Time    CT Abdomen Pelvis With Contrast [[id number]] Collected: 07/08/25 1707     Updated: 07/08/25 1713    Narrative:      CT ABDOMEN PELVIS W CONTRAST-     INDICATIONS: Left flank pain     TECHNIQUE: Radiation dose reduction techniques were utilized, including  automated exposure control and exposure modulation based on body size.  Enhanced ABDOMEN AND PELVIS CT     COMPARISON: 5/4/2025     FINDINGS:     New from the prior exam, multifocal ill-defined hypodensities in the  left kidney, predominant at the left mid to upper kidney are compatible  with pyelonephritis, follow-up recommended to exclude the possibility of  an infiltrative process. Punctate nonobstructive left nephrolithiasis.  No hydronephrosis. Mildly dilated right extrarenal pelvis.     The urinary bladder is mostly empty, limiting its assessment.     Otherwise unremarkable appearance of the liver, gallbladder,  spleen,  adrenal glands, pancreas, kidneys, bladder.     No bowel obstruction or abnormal bowel thickening is identified. Tiny  appendicolithiasis is suggested, but the appendix does not appear  inflamed.     No free intraperitoneal gas or free fluid.     Scattered small mesenteric and para-aortic lymph nodes are seen that are  not significant by size criteria.     Abdominal aorta is not aneurysmal.           The lung bases show minimal atelectasis/scarring.     Degenerative changes are seen in the spine. No acute fracture is  identified.             Impression:         Findings of left pyelonephritis, follow-up recommended.     This report was finalized on 7/8/2025 5:10 PM by Dr. José Elizabeth M.D on Workstation: [employer]               I have reviewed the medications.     Discharge Medications        ASK your doctor about these medications        Instructions Start Date   desvenlafaxine 50 MG 24 hr tablet  Commonly known as: Pristiq   50 mg, Oral, Daily, For anxiety      Estradiol 0.75 MG/1.25 GM (0.06%) topical gel   Place 1.25 g (1 pump) on the skin as directed by provider Daily.      famotidine 20 MG tablet  Commonly known as: PEPCID   20 mg, Daily      ibuprofen 800 MG tablet  Commonly known as: ADVIL,MOTRIN   800 mg, Every 8 Hours PRN      levothyroxine 25 MCG tablet  Commonly known as: SYNTHROID, LEVOTHROID   Take 1 tablet by mouth Daily before breakfast for thyroid      metoprolol succinate XL 25 MG 24 hr tablet  Commonly known as: TOPROL-XL   25 mg, Oral, Daily      multivitamin with minerals tablet tablet   1 tablet, Daily      naproxen 500 MG tablet  Commonly known as: Naprosyn   500 mg, Oral, 2 Times Daily With Meals      nitrofurantoin (macrocrystal-monohydrate) 100 MG capsule  Commonly known as: MACROBID   100 mg, Oral, Every 12 Hours Scheduled, For UTI      OMEGA-3 FATTY ACIDS PO   1 capsule, Daily      ondansetron ODT 8 MG disintegrating tablet  Commonly known as: ZOFRAN-ODT   8 mg,  Translingual, Every 8 Hours PRN      Progesterone 100 MG capsule  Commonly known as: Prometrium   100 mg, Oral, Daily              ---------------------------------------------------------------------------------------------  Assessment & Plan   Assessment/Problem List    Pyelonephritis      Plan:    Pyelonephritis  - Cardiac monitoring  - Vital signs every 4 hours  - Continuous pulse ox  - Urine culture still pending at time of discharge   - Will discharge on Cefpodoxime for 10 days     Hypothyroidism  - Continue home dose Synthroid     Atrial fibrillation  - Continue metoprolol 25 mg p.o. every 24 hours    Disposition: Discharge to home     Follow-up after Discharge: PCP in 1-2 weeks    This note will serve as a discharge summary    Anne Marie Calabrese PA-C 07/09/25 07:37 EDT    I have worn appropriate PPE during this patient encounter, sanitized my hands both with entering and exiting patient's room.      38 minutes has been spent by [facility] providers in the care of this patient while under observation status on this date 07/09/25

## 2025-07-10 ENCOUNTER — TRANSITIONAL CARE MANAGEMENT TELEPHONE ENCOUNTER (OUTPATIENT)
Dept: CALL CENTER | Facility: HOSPITAL | Age: 50
End: 2025-07-10
Payer: COMMERCIAL

## 2025-07-10 LAB
BACTERIA SPEC AEROBE CULT: NO GROWTH
CONV .: NORMAL
CYTOLOGIST CVX/VAG CYTO: NORMAL
CYTOLOGY CVX/VAG DOC CYTO: NORMAL
CYTOLOGY CVX/VAG DOC THIN PREP: NORMAL
DX ICD CODE: NORMAL
OTHER STN SPEC: NORMAL
SERVICE CMNT-IMP: NORMAL
STAT OF ADQ CVX/VAG CYTO-IMP: NORMAL

## 2025-07-10 NOTE — OUTREACH NOTE
Call Center TCM Note      Flowsheet Row Responses   [facility] patient discharged from? [place]   Does the patient have one of the following disease processes/diagnoses(primary or secondary)? Other   TCM attempt successful? No  [vr for mauricio Beckerr]   Unsuccessful attempts Attempt 1   Call Status Left message            EMILY Moreno Registered Nurse    7/10/2025, 10:12 EDT

## 2025-07-10 NOTE — OUTREACH NOTE
Call Center TCM Note      Flowsheet Row Responses   [facility] patient discharged from? [place]   Does the patient have one of the following disease processes/diagnoses(primary or secondary)? Other   TCM attempt successful? No   Unsuccessful attempts Attempt 2            EMILY Moreno Registered Nurse    7/10/2025, 15:50 EDT

## 2025-07-11 ENCOUNTER — TELEPHONE (OUTPATIENT)
Dept: FAMILY MEDICINE CLINIC | Facility: CLINIC | Age: 50
End: 2025-07-11
Payer: COMMERCIAL

## 2025-07-11 ENCOUNTER — TRANSITIONAL CARE MANAGEMENT TELEPHONE ENCOUNTER (OUTPATIENT)
Dept: CALL CENTER | Facility: HOSPITAL | Age: 50
End: 2025-07-11
Payer: COMMERCIAL

## 2025-07-11 NOTE — OUTREACH NOTE
Call Center TCM Note      Flowsheet Row Responses   [facility] patient discharged from? [place]   Does the patient have one of the following disease processes/diagnoses(primary or secondary)? Other   TCM attempt successful? No   Unsuccessful attempts Attempt 3            Suly PATTERSON - Licensed Nurse    7/11/2025, 09:22 EDT

## 2025-07-13 LAB
BACTERIA SPEC AEROBE CULT: NORMAL
BACTERIA SPEC AEROBE CULT: NORMAL

## 2025-07-31 RX ORDER — METOPROLOL SUCCINATE 25 MG/1
25 TABLET, EXTENDED RELEASE ORAL DAILY
Qty: 30 TABLET | Refills: 0 | OUTPATIENT
Start: 2025-07-31

## 2025-07-31 NOTE — TELEPHONE ENCOUNTER
I saw the patient in the emergency department, I am not involved in her continued care.  Any refills of medications should come from her primary care provider or the referred cardiologist.

## 2025-08-25 ENCOUNTER — OFFICE VISIT (OUTPATIENT)
Dept: FAMILY MEDICINE CLINIC | Facility: CLINIC | Age: 50
End: 2025-08-25
Payer: COMMERCIAL

## 2025-08-25 VITALS
WEIGHT: 162 LBS | OXYGEN SATURATION: 96 % | DIASTOLIC BLOOD PRESSURE: 58 MMHG | TEMPERATURE: 97.5 F | BODY MASS INDEX: 23.99 KG/M2 | RESPIRATION RATE: 16 BRPM | HEART RATE: 71 BPM | SYSTOLIC BLOOD PRESSURE: 108 MMHG | HEIGHT: 69 IN

## 2025-08-25 DIAGNOSIS — N39.0 RECURRENT UTI: ICD-10-CM

## 2025-08-25 DIAGNOSIS — N20.0 RENAL STONE: ICD-10-CM

## 2025-08-25 DIAGNOSIS — I48.91 NEW ONSET ATRIAL FIBRILLATION: ICD-10-CM

## 2025-08-25 DIAGNOSIS — G25.81 RLS (RESTLESS LEGS SYNDROME): ICD-10-CM

## 2025-08-25 DIAGNOSIS — Z09 HOSPITAL DISCHARGE FOLLOW-UP: Primary | ICD-10-CM

## 2025-08-25 DIAGNOSIS — M72.2 PLANTAR FASCIITIS, BILATERAL: ICD-10-CM

## 2025-08-25 DIAGNOSIS — E55.9 VITAMIN D DEFICIENCY: ICD-10-CM

## 2025-08-25 DIAGNOSIS — R73.01 IMPAIRED FASTING GLUCOSE: ICD-10-CM

## 2025-08-25 DIAGNOSIS — E03.9 HYPOTHYROIDISM, ACQUIRED: ICD-10-CM

## 2025-08-25 DIAGNOSIS — F41.8 SITUATIONAL ANXIETY: ICD-10-CM

## 2025-08-25 RX ORDER — METOPROLOL SUCCINATE 25 MG/1
25 TABLET, EXTENDED RELEASE ORAL DAILY
Qty: 90 TABLET | Refills: 3 | Status: SHIPPED | OUTPATIENT
Start: 2025-08-25

## 2025-08-25 RX ORDER — ROPINIROLE 0.5 MG/1
TABLET, FILM COATED ORAL
Qty: 180 TABLET | Refills: 3 | Status: SHIPPED | OUTPATIENT
Start: 2025-08-25

## 2025-08-25 RX ORDER — HYDROXYZINE HYDROCHLORIDE 10 MG/1
10-20 TABLET, FILM COATED ORAL EVERY 8 HOURS PRN
Qty: 45 TABLET | Refills: 5 | Status: SHIPPED | OUTPATIENT
Start: 2025-08-25

## 2025-08-25 RX ORDER — LEVOTHYROXINE SODIUM 25 UG/1
25 TABLET ORAL DAILY
Qty: 90 TABLET | Refills: 3 | Status: SHIPPED | OUTPATIENT
Start: 2025-08-25

## 2025-08-26 ENCOUNTER — RESULTS FOLLOW-UP (OUTPATIENT)
Dept: FAMILY MEDICINE CLINIC | Facility: CLINIC | Age: 50
End: 2025-08-26
Payer: COMMERCIAL

## 2025-08-26 LAB
25(OH)D3+25(OH)D2 SERPL-MCNC: 82.7 NG/ML (ref 30–100)
ALBUMIN SERPL-MCNC: 5.2 G/DL (ref 3.8–4.9)
ALP SERPL-CCNC: 73 IU/L (ref 44–121)
ALT SERPL-CCNC: 14 IU/L (ref 0–32)
APPEARANCE UR: CLEAR
AST SERPL-CCNC: 23 IU/L (ref 0–40)
BACTERIA #/AREA URNS HPF: NORMAL /[HPF]
BASOPHILS # BLD AUTO: 0 X10E3/UL (ref 0–0.2)
BASOPHILS NFR BLD AUTO: 1 %
BILIRUB SERPL-MCNC: 0.5 MG/DL (ref 0–1.2)
BILIRUB UR QL STRIP: NEGATIVE
BUN SERPL-MCNC: 11 MG/DL (ref 6–24)
BUN/CREAT SERPL: 17 (ref 9–23)
CALCIUM SERPL-MCNC: 10.1 MG/DL (ref 8.7–10.2)
CASTS URNS QL MICRO: NORMAL /LPF
CHLORIDE SERPL-SCNC: 102 MMOL/L (ref 96–106)
CHOLEST SERPL-MCNC: 262 MG/DL (ref 100–199)
CO2 SERPL-SCNC: 22 MMOL/L (ref 20–29)
COLOR UR: YELLOW
CREAT SERPL-MCNC: 0.65 MG/DL (ref 0.57–1)
EGFRCR SERPLBLD CKD-EPI 2021: 107 ML/MIN/1.73
EOSINOPHIL # BLD AUTO: 0.5 X10E3/UL (ref 0–0.4)
EOSINOPHIL NFR BLD AUTO: 9 %
EPI CELLS #/AREA URNS HPF: NORMAL /HPF (ref 0–10)
ERYTHROCYTE [DISTWIDTH] IN BLOOD BY AUTOMATED COUNT: 13.1 % (ref 11.7–15.4)
FOLATE SERPL-MCNC: 18.5 NG/ML
GLOBULIN SER CALC-MCNC: 2.5 G/DL (ref 1.5–4.5)
GLUCOSE SERPL-MCNC: 77 MG/DL (ref 70–99)
GLUCOSE UR QL STRIP: NEGATIVE
HBA1C MFR BLD: 5.6 % (ref 4.8–5.6)
HCT VFR BLD AUTO: 40.8 % (ref 34–46.6)
HDLC SERPL-MCNC: 121 MG/DL
HGB BLD-MCNC: 13.3 G/DL (ref 11.1–15.9)
HGB UR QL STRIP: NEGATIVE
IMM GRANULOCYTES # BLD AUTO: 0 X10E3/UL (ref 0–0.1)
IMM GRANULOCYTES NFR BLD AUTO: 0 %
KETONES UR QL STRIP: NEGATIVE
LDLC SERPL CALC-MCNC: 122 MG/DL (ref 0–99)
LEUKOCYTE ESTERASE UR QL STRIP: NEGATIVE
LYMPHOCYTES # BLD AUTO: 2.2 X10E3/UL (ref 0.7–3.1)
LYMPHOCYTES NFR BLD AUTO: 40 %
MCH RBC QN AUTO: 29.9 PG (ref 26.6–33)
MCHC RBC AUTO-ENTMCNC: 32.6 G/DL (ref 31.5–35.7)
MCV RBC AUTO: 92 FL (ref 79–97)
MICRO URNS: NORMAL
MICRO URNS: NORMAL
MONOCYTES # BLD AUTO: 0.5 X10E3/UL (ref 0.1–0.9)
MONOCYTES NFR BLD AUTO: 9 %
NEUTROPHILS # BLD AUTO: 2.3 X10E3/UL (ref 1.4–7)
NEUTROPHILS NFR BLD AUTO: 41 %
NITRITE UR QL STRIP: NEGATIVE
PH UR STRIP: 6.5 [PH] (ref 5–7.5)
PLATELET # BLD AUTO: 241 X10E3/UL (ref 150–450)
POTASSIUM SERPL-SCNC: 4.2 MMOL/L (ref 3.5–5.2)
PROT SERPL-MCNC: 7.7 G/DL (ref 6–8.5)
PROT UR QL STRIP: NEGATIVE
RBC # BLD AUTO: 4.45 X10E6/UL (ref 3.77–5.28)
RBC #/AREA URNS HPF: NORMAL /HPF (ref 0–2)
SODIUM SERPL-SCNC: 141 MMOL/L (ref 134–144)
SP GR UR STRIP: 1.01 (ref 1–1.03)
TRIGL SERPL-MCNC: 113 MG/DL (ref 0–149)
UROBILINOGEN UR STRIP-MCNC: 0.2 MG/DL (ref 0.2–1)
VIT B12 SERPL-MCNC: 442 PG/ML (ref 232–1245)
VLDLC SERPL CALC-MCNC: 19 MG/DL (ref 5–40)
WBC # BLD AUTO: 5.6 X10E3/UL (ref 3.4–10.8)
WBC #/AREA URNS HPF: NORMAL /HPF (ref 0–5)

## (undated) DEVICE — FLEX ADVANTAGE 1500CC: Brand: FLEX ADVANTAGE

## (undated) DEVICE — SKIN PREP TRAY W/CHG: Brand: MEDLINE INDUSTRIES, INC.

## (undated) DEVICE — GLV SURG BIOGEL LTX PF 6

## (undated) DEVICE — GOWN PROC ENDOARMOR GI LVL3 HY/SHLD UNIV

## (undated) DEVICE — SINGLE-USE BIOPSY FORCEPS: Brand: RADIAL JAW 4

## (undated) DEVICE — BITEBLOCK OMNI BLOC

## (undated) DEVICE — TBG PENCL TELESCP MEGADYNE SMOKE EVAC 10FT

## (undated) DEVICE — Device

## (undated) DEVICE — PK ENT MAJ 40

## (undated) DEVICE — GLV SURG SENSICARE POLYISPRN W/ALOE PF LF 6.5 GRN STRL

## (undated) DEVICE — GOWN,SIRUS,NON REINFRCD,LARGE,SET IN SL: Brand: MEDLINE

## (undated) DEVICE — TRAP FLD MINIVAC MEGADYNE 100ML

## (undated) DEVICE — NDL HYPO ECLPS SFTY 22G 1 1/2IN

## (undated) DEVICE — KT ORCA ORCAPOD DISP STRL

## (undated) DEVICE — ANTIBACTERIAL UNDYED BRAIDED (POLYGLACTIN 910), SYNTHETIC ABSORBABLE SUTURE: Brand: COATED VICRYL

## (undated) DEVICE — MSK ENDO PORT O2 POM ELITE CURAPLEX A/

## (undated) DEVICE — VIAL FORMLN CAP 10PCT 20ML